# Patient Record
Sex: FEMALE | Race: WHITE
[De-identification: names, ages, dates, MRNs, and addresses within clinical notes are randomized per-mention and may not be internally consistent; named-entity substitution may affect disease eponyms.]

---

## 2017-07-04 ENCOUNTER — HOSPITAL ENCOUNTER (EMERGENCY)
Dept: HOSPITAL 53 - M ED | Age: 72
LOS: 1 days | Discharge: HOME | End: 2017-07-05
Payer: COMMERCIAL

## 2017-07-04 VITALS — WEIGHT: 137.79 LBS | HEIGHT: 63 IN | BODY MASS INDEX: 24.41 KG/M2

## 2017-07-04 DIAGNOSIS — I10: Primary | ICD-10-CM

## 2017-07-04 DIAGNOSIS — Z79.52: ICD-10-CM

## 2017-07-04 DIAGNOSIS — Z79.899: ICD-10-CM

## 2017-07-04 DIAGNOSIS — R94.31: ICD-10-CM

## 2017-07-05 VITALS — DIASTOLIC BLOOD PRESSURE: 76 MMHG | SYSTOLIC BLOOD PRESSURE: 154 MMHG

## 2017-07-05 LAB
ANION GAP SERPL CALC-SCNC: 8 MEQ/L (ref 8–16)
BASOPHILS # BLD AUTO: 0.1 K/MM3 (ref 0–0.2)
BASOPHILS NFR BLD AUTO: 0.5 % (ref 0–1)
BUN SERPL-MCNC: 25 MG/DL (ref 7–18)
CALCIUM SERPL-MCNC: 9.2 MG/DL (ref 8.8–10.2)
CHLORIDE SERPL-SCNC: 106 MEQ/L (ref 98–107)
CO2 SERPL-SCNC: 27 MEQ/L (ref 21–32)
CREAT SERPL-MCNC: 0.81 MG/DL (ref 0.55–1.02)
EOSINOPHIL # BLD AUTO: 0.1 K/MM3 (ref 0–0.5)
EOSINOPHIL NFR BLD AUTO: 0.5 % (ref 0–3)
ERYTHROCYTE [DISTWIDTH] IN BLOOD BY AUTOMATED COUNT: 12.3 % (ref 11.5–14.5)
GFR SERPL CREATININE-BSD FRML MDRD: > 60 ML/MIN/{1.73_M2} (ref 39–?)
GLUCOSE SERPL-MCNC: 100 MG/DL (ref 83–110)
LARGE UNSTAINED CELL #: 0.2 K/MM3 (ref 0–0.4)
LARGE UNSTAINED CELL %: 1.7 % (ref 0–4)
LYMPHOCYTES # BLD AUTO: 3.6 K/MM3 (ref 1.5–4.5)
LYMPHOCYTES NFR BLD AUTO: 28.1 % (ref 24–44)
MCH RBC QN AUTO: 32 PG (ref 27–33)
MCHC RBC AUTO-ENTMCNC: 33.9 G/DL (ref 32–36.5)
MCV RBC AUTO: 94.4 FL (ref 80–96)
MONOCYTES # BLD AUTO: 0.9 K/MM3 (ref 0–0.8)
MONOCYTES NFR BLD AUTO: 6.6 % (ref 0–5)
NEUTROPHILS # BLD AUTO: 8.1 K/MM3 (ref 1.8–7.7)
NEUTROPHILS NFR BLD AUTO: 62.7 % (ref 36–66)
PLATELET # BLD AUTO: 277 K/MM3 (ref 150–450)
POTASSIUM SERPL-SCNC: 4 MEQ/L (ref 3.5–5.1)
SODIUM SERPL-SCNC: 141 MEQ/L (ref 136–145)
WBC # BLD AUTO: 12.9 K/MM3 (ref 4–10)

## 2017-07-06 NOTE — ECGEPIP
Stationary ECG Study

                           [facility] - ED

                                       

                                       Test Date:    2017

Pat Name:     LISANDRO MEHTA         Department:   

Patient ID:   [id number]                 Room:         -

Gender:       F                        Technician:   de

:          1945               Requested By: JOSE MANUEL VALENZUELA

Order Number: [id number]     Reading MD:   Brionan Rouse

                                 Measurements

Intervals                              Axis          

Rate:         54                       P:            64

VA:           189                      QRS:          44

QRSD:         84                       T:            27

QT:           417                                    

QTc:          395                                    

                           Interpretive Statements

SINUS BRADYCARDIA

NSTTW ABNORMALITY

NO PRIOR FOR COMPARISON

Electronically Signed On 2017 7:20:48 EDT by Brionna Rouse

## 2017-09-18 ENCOUNTER — HOSPITAL ENCOUNTER (OUTPATIENT)
Dept: HOSPITAL 53 - M RAD | Age: 72
End: 2017-09-18
Attending: FAMILY MEDICINE
Payer: COMMERCIAL

## 2017-09-18 DIAGNOSIS — Z12.31: Primary | ICD-10-CM

## 2017-09-18 NOTE — REPMRS
Patient History

The patient states she has not had a clinical breast exam in over

a year. Patient is postmenopausal.

Took hormonal contraceptives for 7 years.  Took unspecified 

hormones for 3 years.

 

Digital Mammo Screening Bilat: September 18, 2017 - Exam #: 

[id number]

Bilateral CC and MLO view(s) were taken.

 

Technologists: Nery Mendosa, Technologist; Ebenezer Mercado, 

Technologist

Prior study comparison: September 15, 2016, bilateral digital 

mammo screening bilat performed at [facility].  

September 10, 2015, digital woman screen mammo, performed at 

[facility].

 

FINDINGS: There are scattered fibroglandular densities.  There 

has been no change in the appearance of the mammogram from the 

prior studies.  There is a mild amount of residual fibroglandular

tissue which is fairly symmetric. There is no interval 

development of dominant mass, architectural distortion, or 

clustered microcalcification suggestive of malignancy.

 

ASSESSMENT: BI-RADS/ACR category 1 mammogram. Negative.

 

Recommendation

Routine screening mammogram in 1 year (for women over age 40).

This mammogram was interpreted with the aid of an FDA-approved 

computer-aided dectection system.

 

Electronically Signed By: Kirill Dias MD 09/18/17 [id number]

## 2018-09-19 ENCOUNTER — HOSPITAL ENCOUNTER (OUTPATIENT)
Dept: HOSPITAL 53 - M RAD | Age: 73
End: 2018-09-19
Attending: FAMILY MEDICINE
Payer: COMMERCIAL

## 2018-09-19 DIAGNOSIS — Z12.31: Primary | ICD-10-CM

## 2018-09-19 DIAGNOSIS — Z79.890: ICD-10-CM

## 2018-09-19 DIAGNOSIS — Z79.3: ICD-10-CM

## 2018-09-19 PROCEDURE — 77067 SCR MAMMO BI INCL CAD: CPT

## 2019-09-20 ENCOUNTER — HOSPITAL ENCOUNTER (OUTPATIENT)
Dept: HOSPITAL 53 - M RAD | Age: 74
End: 2019-09-20
Attending: FAMILY MEDICINE
Payer: MEDICARE

## 2019-09-20 DIAGNOSIS — Z12.31: Primary | ICD-10-CM

## 2019-09-20 NOTE — REP
BILATERAL SCREENING DIGITAL MAMMOGRAM WITH 3D TOMOSYNTHESIS:

 

There are no palpable abnormalities or other breast complaints.

The the patient states she has not had a clinical breast examination in over a

year.

 

The Tyrer-Cuzick Score is: 2.8% .

 

Comparison is 09/02/2014.

 

There are scattered areas of fibroglandular density.

There is no dominant mass, micro calcific cluster or architectural distortion

that would indicate malignancy.

There are no additional findings on 3D tomosynthesiss.

There is no change from the prior study.

 

Impression:

BIRADS/ACR category 1 mammogram.  Negative.

 

Recommendation:

Routine annual screening mammography.

 

This mammogram was interpreted with the aid of a FDA approved computer-aided

detection system.

 

A. Negative mammogram reports should not delay biopsy if a dominant or clinically

suspicious mass is present.

B. Not all breast cancers are identified by mammography or tomosynthesis.

C.  Adenosis and dense breasts may obscure an underlying neoplasm.

 

Patient letter M1.

 

 

Electronically Signed by

Kirill Shearer MD 09/20/2019 11:36 A

## 2020-08-19 ENCOUNTER — HOSPITAL ENCOUNTER (OUTPATIENT)
Dept: HOSPITAL 53 - M LAB | Age: 75
End: 2020-08-19
Attending: PHYSICIAN ASSISTANT
Payer: MEDICARE

## 2020-08-19 DIAGNOSIS — R94.31: Primary | ICD-10-CM

## 2020-08-19 DIAGNOSIS — R06.02: ICD-10-CM

## 2020-09-30 ENCOUNTER — HOSPITAL ENCOUNTER (OUTPATIENT)
Dept: HOSPITAL 53 - M WHC | Age: 75
End: 2020-09-30
Attending: FAMILY MEDICINE
Payer: MEDICARE

## 2020-09-30 DIAGNOSIS — Z92.0: ICD-10-CM

## 2020-09-30 DIAGNOSIS — Z12.31: Primary | ICD-10-CM

## 2020-10-01 NOTE — REPMRS
Patient History

The patient states she has not had a clinical breast exam in over

a year.

Took hormonal contraceptives for 7 years.  Took unspecified 

hormones for 3 years.

 

3D TOMOSYNTHESIS WAS PERFORMED.

 

[facility] lifetime risk for breast cancer is 2.5 %.

 

Volpara density b.

 

Digital Woman Screen Mammo: September 30, 2020 - Exam #: 

[id number]

Bilateral CC and MLO view(s) were taken.

 

Technologist: Debra Woodard, Technologist

Prior study comparison: September 20, 2019, bilateral digital 

mammo screening bilat, performed at [facility].  

September 19, 2018, bilateral digital mammo screening bilat, 

performed at [facility].

 

FINDINGS: There are scattered fibroglandular densities.  There 

has been no change in the appearance of the mammogram from the 

prior studies.  There is a mild amount of residual fibroglandular

tissue which is fairly symmetric. There is no interval 

development of dominant mass, architectural distortion, or 

clustered microcalcification suggestive of malignancy.

 

Assessment: BI-RADS/ACR category 1 mammogram. Negative Mammogram.

 

Recommendation

Routine screening mammogram in 1 year (for women over age 40).

This mammogram was interpreted with the aid of an FDA-approved 

computer-aided dectection system.

 

Electronically Signed By: Kirill Dias MD 09/30/20 1171

## 2021-02-15 ENCOUNTER — HOSPITAL ENCOUNTER (EMERGENCY)
Dept: HOSPITAL 53 - M ED | Age: 76
Discharge: HOME | End: 2021-02-15
Payer: MEDICARE

## 2021-02-15 VITALS — WEIGHT: 135.14 LBS | BODY MASS INDEX: 23.95 KG/M2 | HEIGHT: 63 IN

## 2021-02-15 VITALS — DIASTOLIC BLOOD PRESSURE: 67 MMHG | SYSTOLIC BLOOD PRESSURE: 131 MMHG

## 2021-02-15 DIAGNOSIS — I10: ICD-10-CM

## 2021-02-15 DIAGNOSIS — N20.0: ICD-10-CM

## 2021-02-15 DIAGNOSIS — Z79.899: ICD-10-CM

## 2021-02-15 DIAGNOSIS — R10.84: Primary | ICD-10-CM

## 2021-02-15 DIAGNOSIS — K57.30: ICD-10-CM

## 2021-02-15 LAB
ALBUMIN SERPL BCG-MCNC: 3.8 GM/DL (ref 3.2–5.2)
ALT SERPL W P-5'-P-CCNC: 16 U/L (ref 12–78)
BASOPHILS # BLD AUTO: 0 10^3/UL (ref 0–0.2)
BASOPHILS NFR BLD AUTO: 0.5 % (ref 0–1)
BILIRUB CONJ SERPL-MCNC: 0.1 MG/DL (ref 0–0.2)
BILIRUB SERPL-MCNC: 0.7 MG/DL (ref 0.2–1)
BUN SERPL-MCNC: 26 MG/DL (ref 7–18)
CALCIUM SERPL-MCNC: 9.4 MG/DL (ref 8.8–10.2)
CHLORIDE SERPL-SCNC: 106 MEQ/L (ref 98–107)
CK MB CFR.DF SERPL CALC: 2.05
CK MB SERPL-MCNC: 1.5 NG/ML (ref ?–3.6)
CK SERPL-CCNC: 73 U/L (ref 26–192)
CO2 SERPL-SCNC: 26 MEQ/L (ref 21–32)
CREAT SERPL-MCNC: 1.01 MG/DL (ref 0.55–1.3)
EOSINOPHIL # BLD AUTO: 0.1 10^3/UL (ref 0–0.5)
EOSINOPHIL NFR BLD AUTO: 1.5 % (ref 0–3)
GFR SERPL CREATININE-BSD FRML MDRD: 56.6 ML/MIN/{1.73_M2} (ref 39–?)
GLUCOSE SERPL-MCNC: 100 MG/DL (ref 70–100)
HCT VFR BLD AUTO: 41.4 % (ref 36–47)
HGB BLD-MCNC: 13.4 G/DL (ref 12–15.5)
LIPASE SERPL-CCNC: 124 U/L (ref 73–393)
LYMPHOCYTES # BLD AUTO: 1.7 10^3/UL (ref 1.5–5)
LYMPHOCYTES NFR BLD AUTO: 28.4 % (ref 24–44)
MCH RBC QN AUTO: 30.6 PG (ref 27–33)
MCHC RBC AUTO-ENTMCNC: 32.4 G/DL (ref 32–36.5)
MCV RBC AUTO: 94.5 FL (ref 80–96)
MONOCYTES # BLD AUTO: 0.5 10^3/UL (ref 0–0.8)
MONOCYTES NFR BLD AUTO: 8.7 % (ref 2–8)
NEUTROPHILS # BLD AUTO: 3.6 10^3/UL (ref 1.5–8.5)
NEUTROPHILS NFR BLD AUTO: 60.7 % (ref 36–66)
PLATELET # BLD AUTO: 248 10^3/UL (ref 150–450)
POTASSIUM SERPL-SCNC: 4.9 MEQ/L (ref 3.5–5.1)
PROT SERPL-MCNC: 6.9 GM/DL (ref 6.4–8.2)
RBC # BLD AUTO: 4.38 10^6/UL (ref 4–5.4)
SODIUM SERPL-SCNC: 140 MEQ/L (ref 136–145)
TROPONIN I SERPL-MCNC: < 0.02 NG/ML (ref ?–0.1)
WBC # BLD AUTO: 5.9 10^3/UL (ref 4–10)

## 2021-02-15 PROCEDURE — 96361 HYDRATE IV INFUSION ADD-ON: CPT

## 2021-02-15 PROCEDURE — 93005 ELECTROCARDIOGRAM TRACING: CPT

## 2021-02-15 PROCEDURE — 82553 CREATINE MB FRACTION: CPT

## 2021-02-15 PROCEDURE — 83690 ASSAY OF LIPASE: CPT

## 2021-02-15 PROCEDURE — 80053 COMPREHEN METABOLIC PANEL: CPT

## 2021-02-15 PROCEDURE — 74177 CT ABD & PELVIS W/CONTRAST: CPT

## 2021-02-15 PROCEDURE — 96374 THER/PROPH/DIAG INJ IV PUSH: CPT

## 2021-02-15 PROCEDURE — 82550 ASSAY OF CK (CPK): CPT

## 2021-02-15 PROCEDURE — 81001 URINALYSIS AUTO W/SCOPE: CPT

## 2021-02-15 PROCEDURE — 99285 EMERGENCY DEPT VISIT HI MDM: CPT

## 2021-02-15 PROCEDURE — 84484 ASSAY OF TROPONIN QUANT: CPT

## 2021-02-15 PROCEDURE — 93041 RHYTHM ECG TRACING: CPT

## 2021-02-15 PROCEDURE — 80076 HEPATIC FUNCTION PANEL: CPT

## 2021-02-15 PROCEDURE — 74021 RADEX ABDOMEN 3+ VIEWS: CPT

## 2021-02-15 PROCEDURE — 85025 COMPLETE CBC W/AUTO DIFF WBC: CPT

## 2021-02-15 RX ADMIN — DIATRIZOATE MEGLUMINE AND DIATRIZOATE SODIUM SCH ML: 600; 100 SOLUTION ORAL; RECTAL at 11:35

## 2021-02-15 RX ADMIN — DIATRIZOATE MEGLUMINE AND DIATRIZOATE SODIUM SCH ML: 600; 100 SOLUTION ORAL; RECTAL at 11:10

## 2021-02-15 NOTE — CCD
Summarization Of Episode

                             Created on: 02/15/2021



LISANDRO PLEITEZ

External Reference #: [id number]

: 1945

Sex: Female



Demographics





                          Address                   [address] [place]  [address]

 

                          Home Phone                [phone]

 

                          Preferred Language        English

 

                          Marital Status            

 

                          Yazdanism Affiliation     [facility]

 

                          Race                      White

 

                          Ethnic Group              Not  or 





Author





                          Author                    [facility]

 

                          Organization              [facility]

 

                          Address                   Unknown

 

                          Phone                     Unavailable







Support





                Name            Relationship    Address         Phone

 

                RETIRED         Next Of Kin     Unknown         [phone]

 

                RE              Next Of Kin     Unknown         Unavailable

 

                UE              Next Of Kin     Unknown         Unavailable

 

                    JOSE GUADALUPE PLEITEZ JR   Next Of Kin         [place], [place]  [address] [phone]

 

                    valdemar pleitez     ECON                [address] [place]  [address]                    [phone]







Care Team Providers





                    Care Team Member Name Role                Phone

 

                    SymenowLatia PA Unavailable         Unavailable

 

                    Symenow, Latia Stuart PA Unavailable         Unavailable

 

                    Symenow, Latia Stuart PA Unavailable         Unavailable

 

                    Symenow, Latia Stuart PA Unavailable         Unavailable

 

                    Symenow, Latia Stuart PA Unavailable         Unavailable

 

                    Symenow, Latia Stuart PA Unavailable         Unavailable

 

                    Symenow, Latia Stuart PA Unavailable         Unavailable

 

                    Symenow, Latia Sade PA Unavailable         Unavailable

 

                    Symenow, Latia Sade PA Unavailable         Unavailable

 

                    Symenow, Latia Stuart PA Unavailable         Unavailable

 

                    Symenow, Latia Sade PA Unavailable         Unavailable

 

                    Symenow, Latia Sade PA Unavailable         Unavailable

 

                    Symenow, Latia Sade PA Unavailable         Unavailable

 

                    Symenow, Latia Sade PA Unavailable         Unavailable

 

                    Symenow, Latia Sade PA Unavailable         Unavailable

 

                    Symenow, Latia Sade PA Unavailable         Unavailable

 

                    Symenow, Latia Sade PA Unavailable         Unavailable

 

                    Symenow, Latia Sade PA Unavailable         Unavailable

 

                    Symenow, Latia Sade PA Unavailable         Unavailable

 

                    Symenow, Latia Sade PA Unavailable         Unavailable

 

                    Symenow, Latia Sade PA Unavailable         Unavailable

 

                    Symenow, Latia Sade PA Unavailable         Unavailable

 

                    Symenow, Latia Sade PA Unavailable         Unavailable

 

                    Symenow, Latia Sade PA Unavailable         Unavailable

 

                    Symenow, Latia Sade PA Unavailable         Unavailable

 

                    Symenow, Latia Sade PA Unavailable         Unavailable

 

                    Symenow, Latia Sade PA Unavailable         Unavailable

 

                    Symenow, Latia Sade PA Unavailable         Unavailable

 

                    Symenow, Latia Sade PA Unavailable         Unavailable

 

                    Symenow, Latia Sade PA Unavailable         Unavailable

 

                    Symenow, Latia Sade PA Unavailable         Unavailable

 

                    Symenow, Latia Sade PA Unavailable         Unavailable

 

                    Symenow, Latia Sade PA Unavailable         Unavailable

 

                    Symenow, Latia Sade PA Unavailable         Unavailable

 

                    Symenow, Latia Sade PA Unavailable         Unavailable

 

                    Symenow, Latia Sade PA Unavailable         Unavailable

 

                    MAJAK, R MENDOZA DPM Unavailable         Unavailable

 

                    MAJAK, R MENDOZA DPM Unavailable         Unavailable

 

                    MAJAK, R MENDOZA DPM Unavailable         Unavailable

 

                    MAJAK, R MENDOZA DPM Unavailable         Unavailable

 

                    MAJAK, R MENDOZA DPM Unavailable         Unavailable

 

                    MAJAK, R MENDOZA DPM Unavailable         Unavailable

 

                    MAJAK, R MENDOZA DPM Unavailable         Unavailable

 

                    MAJAK, R MENDOZA DPM Unavailable         Unavailable

 

                    MAJAK, R MENDOZA DPM Unavailable         Unavailable

 

                    MAJAK, R MENDOZA DPM Unavailable         Unavailable

 

                    MAJAK, R MENDOZA DPM Unavailable         Unavailable

 

                    MAJAK, R MENDOZA DPM Unavailable         Unavailable

 

                    MAJAK, R MENDOZA DPM Unavailable         Unavailable

 

                    MAJAK, R MENDOZA DPM Unavailable         Unavailable

 

                    MAJAK, R MENDOZA DPM Unavailable         Unavailable

 

                    MAJAK, R MENDOZA DPM Unavailable         Unavailable

 

                    MAJAK, R MENDOZA DPM Unavailable         Unavailable

 

                    MAJAK, R MENDOZA DPM Unavailable         Unavailable

 

                    MAJAK, R MENDOZA DPM Unavailable         Unavailable

 

                    MAJAK, R MENDOZA DPM Unavailable         Unavailable

 

                    MAJAK, R MENDOZA DPM Unavailable         Unavailable

 

                    MAJAK, R MENDOZA DPM Unavailable         Unavailable

 

                    MAJAK, R MENDOZA DPM Unavailable         Unavailable

 

                    MAJAK, R MENDOZA DPM Unavailable         Unavailable

 

                    MAJAK, R MENDOZA DPM Unavailable         Unavailable

 

                    MAJAK, R MENDOZA DPM Unavailable         Unavailable

 

                    MAJAK, R MENDOZA DPM Unavailable         Unavailable

 

                    MAJAK, R MENDOZA DPM Unavailable         Unavailable

 

                    MAJAK, R MENDOZA DPM Unavailable         Unavailable

 

                    MAJAK, R MENDOZA DPM Unavailable         Unavailable

 

                    ANA PAULA CASTORENA  Unavailable         Unavailable

 

                    ANA PAULA CASTORENA  Unavailable         Unavailable

 

                    ANA PAULA CASTORENA  Unavailable         Unavailable

 

                    ANA PUALA CASTORENA  Unavailable         Unavailable

 

                    ANA PAULA CASTORENA  Unavailable         Unavailable

 

                    ANA PAULA CASTORENA  Unavailable         Unavailable



                                  



Re-disclosure Warning

          The records that you are about to access may contain information from 
federally-assisted alcohol or drug abuse programs. If such information is 
present, then the following federally mandated warning applies: This information
has been disclosed to you from records protected by federal confidentiality 
rules (42 CFR part 2). The federal rules prohibit you from making any further 
disclosure of this information unless further disclosure is expressly permitted 
by the written consent of the person to whom it pertains or as otherwise 
permitted by 42 CFR part 2. A general authorization for the release of medical 
or other information is NOT sufficient for this purpose. The Federal rules 
restrict any use of the information to criminally investigate or prosecute any 
alcohol or drug abuse patient.The records that you are about to access may 
contain highly sensitive health information, the redisclosure of which is 
protected by Article 27-F of the [facility] Public Health law. If you 
continue you may have access to information: Regarding HIV / AIDS; Provided by 
facilities licensed or operated by the [facility] Office of Mental Health; 
or Provided by the [facility] Office for People With Developmental 
Disabilities. If such information is present, then the following [place] 
mandated warning applies: This information has been disclosed to you from 
confidential records which are protected by state law. State law prohibits you 
from making any further disclosure of this information without the specific 
written consent of the person to whom it pertains, or as otherwise permitted by 
law. Any unauthorized further disclosure in violation of state law may result in
a fine or nursing home sentence or both. A general authorization for the release of 
medical or other information is NOT sufficient authorization for further disc
losure.                                                                         
    



Family History

          



             Family Member Name Family Member Gender Family Member Status Date o

f Status 

Description                             Data Source(s)

 

           Unknown    Male       Problem                          [facility] ([facility]

 

           Unknown    Male       Problem                          [facility]

 

           Unknown    Male       Problem                          [facility]

 

           Unknown    Male       Problem                          [facility]

 

           Unknown    Male       Problem                          [facility]

 

           Unknown    Male       Problem                          [facility]



                                                                                
                 



Encounters

          



           Encounter  Providers  Location   Date       Indications Data Source(s

)

 

                Office Visit    Attender: MENDOZA NORRIS [facility] Office  08:15:00 AM 

EST                                                 [facility] ([facility]

 

                Outpatient      Attender: GLENNY CASTORENAAdmitter: GLENNY CASTORENA ES1

-DEPT        10/05/2020 

07:30:00 AM EDT - 10/05/2020 11:40:00 AM EDT                           [facility]

 

                                        Patient discharged. 

 

           Outpatient Attender: Kate Symenow PA Main Office 2020 08:15:00 

AM EDT            

[facility] ([facility])

 

                [address] [address] 2020 

12:00:00 AM EST                                     eCW1 ([facility])



                                                                                
                                     



Immunizations

          



             Vaccine      Date         Status       Description  Data Source(s)

 

                    COVID-19 VACCINE, MRNA, ECX773D9, LNP-S (PFIZER)/PF 20

21 12:00:00 AM EST 

completed                                           [facility]

 

                          INFLUENZA VIRUS VACCINE QUADRIVAL [id number](6 MOS AND 

UP)/PF 10/14/2020 12:00:00

AM EDT              completed                               [facility]



                                                                                
                 



Medications

          



          Medication Brand Name Start Date Product Form Dose      Route     Admi

nistrative 

Instructions Pharmacy Instructions Status     Indications Reaction   Description

 Data 

Source(s)

 

           Ticagrelor 90 MG Oral Tablet [Brilinta] Brilinta   09/15/2020 12:00:0

0 AM EDT                       

ORAL                          active                                  [facility] ([facility]

 

          90 mg               09/15/2020 12:00:00 AM EDT tablet    60           

       TAKE ONE TABLET BY MOUTH TWICE A 

DAY        TAKE ONE TABLET BY MOUTH TWICE A DAY SOLD: 09/15/2020                

                  [facility]

 

                atorvastatin 20 MG Oral Tablet Atorvastatin Calcium 2020 1

2:00:00 AM EDT  

                ORAL                    active                          [facility] (

[facility])



                                                                                
        



Insurance Providers

          



             Payer name   Policy type / Coverage type Policy ID    Covered party

 ID Covered 

party's relationship to greer Policy Greer             Plan Information

 

          WELLCARE            [id number]           SP                  [id number]

 

          WELLCARE MEDICARE           [id number]           [place]                 [id number]

[id number]

 

          WELLCARE  O         [id number]           S                   [id number]

 

          WELLCARE            [id number]           SP                  [id number]

 

          TODAYS OPTIONS           [id number]           SP                  [id number]

[id number]

 

          TODAYS OPTIONS/AMERICAN O         [id number]           S               

    [id number]

 

          TODAYS OPTIONS           [id number]           SP                  [id number]

[id number]

 

          [facility] Commercial [id number]           Self          

      [id number]

 

          [employer] Ppo Commercial [id number]           Self               

 [id number]

 

          TODAYS OPTIONS           [id number]           SP                  [id number]

[id number]

 

          [employer] (Medicare) Medigap Part B [id number]           Self  

              [id number]

 

          [employer] Commercial [id number]           Self                [id number]

[id number]

 

          [employer] Medigap Part B [id number]           Self  

              [id number]

 

          [employer] (Medicare) Medigap Part B [id number]           Self  

              [id number]

 

          [employer] Commercial [id number]           Self                [id number]

[id number]

 

          [employer] (Medicare) Medigap Part B [id number]           Self  

              [id number]

 

          [employer] Commercial [id number]           Self                [id number]

[id number]

 

          [facility] MCR Commercial                     Self             

    

 

          [employer] Options Ppo Commercial                     Self               

  

 

          MEDICARE COMPLETE-[facility] P         [id number]           S               

    [id number]

 

          MEDICARE COMPLETE           [id number]           SP                  

[id number]

 

          [facility] MEDICARE           [id number]           [place]                 [id number]

[id number]

 

          [facility] P         [id number]           S                  

 [id number]

 

                              [id number]                               [id number]



                                                                                
                                                                                
                                                                                
                                                                              



Problems, Conditions, and Diagnoses

          



           Code       Display Name Description Problem Type Effective Dates Data

 Source(s)

 

             [id number]    Pure hypercholesterolemia Pure hypercholesterolemia Pr

oblem      2020

 12:00:00 AM EDT                        [facility] ([facility])

 

             4268742      Aortic valve disorder Aortic valve disorder Problem   

   2020 12:00:00 

AM EDT                                  [facility] ([facility])

 

           N95.2      [id number]  Vaginal atrophy Problem    2020 12:00:00 

AM EST eCW1 

([facility])

 

                I49.3           Ventricular premature depolarization Ventricular

 premature depolarization 

Diagnosis                 10/05/2020 07:30:00 AM EDT [facility]

 

                    I35.1               Nonrheumatic aortic (valve) insufficienc

y Nonrheumatic aortic (valve) 

insufficienc        Diagnosis           10/05/2020 07:30:00 AM EDT [facility]

 

                    I34.0               Nonrheumatic mitral (valve) insufficienc

y Nonrheumatic mitral (valve) 

insufficienc        Diagnosis           10/05/2020 07:30:00 AM EDT [facility]

 

                    R94.39              Abnormal result of other cardiovascular 

function study Abnormal result of

 other cardiovascular Diagnosis           10/05/2020 07:30:00 AM EDT [facility]

 

             R06.02       Shortness of breath Shortness of breath Diagnosis    1

 07:30:00 AM 

EDT                                     [facility]



                                                                                
                                                                                
        



Surgeries/Procedures

          



             Procedure    Description  Date         Indications  Data Source(s)

 

             ECHO TTHRC R-T 2D W/WOM-MODE COMPL SPEC&COLR DOP               12:00:00 AM EDT              

[facility] ([facility])

 

             MYOCARDIAL SPECT MULTIPLE STUDIES              2020 12:00:00 

AM EDT              [facility] 

([facility])

 

             CV STRS TST XERS&/OR RX CONT ECG PHYS SI&R              2020 

12:00:00 AM EDT              [facility]

 ([facility])

 

             ECG ROUTINE ECG W/LEAST 12 LDS W/I&R              2020 12:00:

00 AM EDT              [facility] 

([facility])

 

             Office Visit, Est Pt., Level 2 FC              2020 12:00:00 

AM EST              eCW1 ([facility])

 

             Office Visit, Est Pt., Level 3 PC              2020 12:00:00 

AM EST              eCW1 ([facility])



                                                                                
                                                          



Results

          



                    ID                  Date                Data Source

 

                    [id number]         10/05/2020 11:10:05 AM EDT [facility]









          Name      Value     Range     Interpretation Code Description Data Hannah

rce(s) Supporting 

Document(s)

 

          EKG                                               [facility] 

ULGSIw8aZfVFVbNjc6SkZoXaSNLeGO6jmqu9U5E6dTMgO2NxlMXtz7igY7YkG9DcCRTtDQYOAK2FnTOw

jb2
AlxbxrS5Jwk5NMw206UB1KcV4qqv7Xz1wnYKOuvYeqBd3rbxJhVkhJQMW6BFNdk4NpVGpnHFxeVBQdGr

5jiRUtX2QraPxzNBSxIQtuTAZaU53abKKyA1cXKFMhIG9zw7LszbeiF7vkvlQwk2iOkoEgGDocXtYbBo

NlRJWwfpGkB5hrmNMojXeuSV9+XZ4qn9NfAxWgUABu
HH2mydw8K7W2iASoP0ZvwsPrS0S8RmF5jCFxW5L9nQAcPX8MJC6cJZ6ANnWeK5AyG57npC5gTK4DcV9Q

zyXtLB7oi1QchccqJ6Aho8GGr756NV6ARJt3OFIwM9KgJq8eIZ0+IK4uu1IpWqGfHXPiPJ4qzon2J3W1

aICuY0CikzOdE7Z4ZzS5xAXgD1Y4qLAgVN8YOD8sBM
5RSQSaM8XeR59zzQ5rUG8CaF1DyvQaYQ9yw1GcuswnP4Ich4FXx086RX2SYAy4VLIfN4UqV1FquQT4AU

4+YH3fy8LxAlBkVDUnXA4agdf1M6N3pRRcE5DmuqBmQ9W5HxO9sUDlM9B5eGZjCZ1YJA6fKH2EJweoV1

CeH03jeZ6hJH9VxP5MddYcXC0ui5DzgvctJ2Kxs7LU
e814PB1TALh5JKCnM4XxKb0oXT7phGjfvBC+IcAlLM3kqssrXQGsUZIkPlz8GQ4BzESyRO8Vl807TH4N

rME8vMXgTV5UwLBnBZYnBtMxCRWeK5UmMX6HocIdRQzrApDpW7goUZ4opLBnI19lfL1jSZ1DHYCoSr8x

pHFzK872ozrogm4+DD7vj1FpBjBdWjWsNT9sest1I9
D7wRMcI7ZajzVgS2U8FlH7tYYiM6G8aYHwBK9FNF6aSZ3HDiKwH7JpO20flT0cNE6HeG5YapJnJD5rr5

MbsnlgV8Ycg5LEi775XO0Jh5XlmUZkWKWqqWG+WnTnZA5mszoeNTLlJNDvCer9WQ6JnMImCR4Ff435CX

9JxHC9zVUkCH7QzUXxYKDoFxLiHOGzQ9lzUC3EjsDd
BTxaQyMcG7kkSM4usODmF61xpI0qBS7HEQXqDp0tsVEnZ132vbdjuc7CLjsxcAQiGj8puvGjZckRLED1

RGMje6HdXPdbMKjjBBJpQo3gmHAvD1EouHloEAIdCPqpXPJyT77mfWWrX8OOJGIuNQ1dq1OaxqngR8so

wpUgh8iReuZrDIluWgTiXzHlQEWsuoXmG2SpkYRoSB
TxAm0lAA7qsEjvwDF+ZfDtXB5vkizdFNLwQSAvUxu8SN7ChTPoHR3Uy197TY4CxWF6vQRdMX6XdOEqRV

MpXuMhVLAuWPMxLB7XbhOqWMvlDeRsH2mdIG1kbNGaS87fkI3tTH0GATSwHk7zpNRuGKpgFGTqXw8iWI

4+YcIfUQ1kgflzFHXhLOXtTkn0RU9AfJYlHS5Sz261
OA7CsEI5zZZvFS3ZmEWiRFWpXtInSISiRPRhZW9CmlOyLXltHeIcV7nlUD0zwJElX09ugP5eCH3LVMUs

Tc9fjCYcTEvrSFJbQa2eJR2+SK4pj8KfTtYmLsGkUX9qwyz0M9G4vQJbM7AzcrSnX1H7FoO9fXYeD4E2

qUKpVJ5MKT1mBC4JYSWkF6RbS25bhN6gHT0KnN7Gru
UhOV0ha9JjzyfmR8Iwz1VTi116RU7KzR0yct8WsTPdkYU+FoKwHK2njnokFPobUJJvFje5TA3UZVP0AQ

MoQmAfRXBqCOEjUCYfC6bWCBbeEXVBJC4MMevaXEJuJJMVEN7XFTKvZLGmWPFtO9TIXHOhUWRfCeLkO6

viAOKaJLXORX6AVioaIJCeVJYOZL2PAVFfCBHbATSp
Z0SUKVU9SBHmXcCaQGckYTwcONKEYJ3JBqqcQnUiCJK+KlNdVP0igelfUtFuHC3yemf3Q3Qfj3BLJLSj

Rd4OAUXkD2UurOOrBP5Ge669FZS7IADlWk7+DY4rf0ZgRfEuQZUzy5VwFHgqNWdqEPDjARSgIKAyRIPb

jXYWq8unZoIkULL6JSFbEelmHHYvTYHlRP19RVHyVG
AHTX4ZWGGwiDFjQUClEzBuRLJVZZ4Sj329QB98qtCoDRTxQOSjD7JzhKK3XOB4UTXfPB7jb9ZyANijQi

2xkqErBgmBKwEfPHOcDhw3LSOzGLMsY0CuMCTyDaT1ZW5WjXw3DBEnR9YhDLFcJXWvb4KxGl1Ad9TcWD

IeUaeklG4VmrH0ol7M31/kwPcW7OAOesBoBQUgRCbB
fVG4XuGhAVs7hPf+dpYJqOuZeLyKb9V8c7pUERBVtdY+Qoh4pe8b47iMG//rTTx/wvzn3//2b3/+svHn

//3r58HP//7738m9h0/9yz/++k//+L/+NH/+/ON//PW//eMv+8lm+fT5j/Pxz3/+y3/+j9uth3CCK//2

5yTH/Pn37z//6n09W9B7oL+DKQ/W+0DZL6qiX9u+Hx
ckrY7zERE99SZs0MW1bBKAokjSuomUZWOGSUwh5m2Td6vx1FqCq0kh3XEaj6Gwtn4lwEf3UW02vlvJ4C

4Ijbzu9bfyflR6mAqUt8y4e9MyMydP8P4q4MkiW1U0GiYqecct1fA2LM9dcizreoY9qNhVp4q4b3CbDd

qV0x5c7apzT1h6dqDjp3c78wZ6ME6b1bpysXHYszjG
a4t5h5NqR1b17V6i7MkMORqA0ZeqgqE3YV0ECLnWmleTrwQ9MqwZ3e8Ji6GdFZkOoOjF0HbgMpHLkdbY

6BhzB3Wmxg3wkVnFwj85x7g5Nnu5oS8Z6Zkrghv5IQ6PIMegF0oC7XOjW+4i8d7Ngl6y23T1heEptF5I

85SZzdBwAzgK6rOqyGbbhwwpfw9+Kzt7ocs23pX8PO
fn9zptrLCteltEi9j0n8SiI7s07p9v6nkz04B8hcRjm3+G7jF40JF++Gl+vr7NDz/Oz9fX+eHn+fn6Pj

/8QD9fX+iHn+sw2eq71OM9oF6xY43pk062niDg3ffILON+1mtUvGnXXmjWig3ltxi8AUVsdNgyZiaAmy

Nd0rcTTDC9FRxZCeKzIGctilDJC4/noQmcP9eM5GMa
U9vInZhJo62zYN7vmxblwbq3NX5PVLB3rfHVFdl94NgPC4Jdr9KgNtTRW1sx9DiqXqZC6XnWYOdl8oPt

GM0V+7SWjKrNpWqvUzPMnwfEg8HeR4aP5FdzE4vCsBbPp+VsSF5mj9VnTi/2Id4VgEr8ll+RKnl8CVZR

E8a+uWgbLHTmPsmOgHGo19LRqEpAFpow4wTKKZ9W+7
JbtTcYncqqBoKXhnhsq0wtMmfK5cyaM8zGgEaQs70aSJerpjkXxXnOlSI9qnaEiyVbK/Ly6fuQQJqepR

nrjz2QXNKfb23cCYWWS078koWRgJ7Rq2ha1BbwLfA57HzvMXu85gKpJK1l+1X9zQqKlxt0HuQSpnhic1

wzQ4fI4cLrW4iZyAwId+0mZO0ge/oxmj/8Ce0VKFN0
ffUTHsA86KMDH9m+kTAgPDPjylZCZLXfY5cjTwjZGPY8svpMAqShS/ZlDzntIf+gl9w4aV/LZ735wH/s

Iac95F/3mIYb4k70eWwR+Qf19YOV/MsectpD/nNKLb7f/9OUzMwRw4rNIweGz+yaaz8aT/eQ0f4vL6nR

aQ/0wm2icRq0fx7i8jD+jW3oVpPpWuXzMdRs1cjha+
lxteHT262JDuh32ta4zyRruKewdnLjgjFpNeSrBuelPf+sr3c9gL/QG136zL/nOsk99Z/3fHCw1d79eH

kP+Ld91EQK/MsectpD/uROCw7c/0NXcSeEr7aWWmeJb+nnpw7eR/hG2r0qG4dZwM/3gb5lwWo1rr2j2o

P+KP194OU/clqa9sN/5KAFj2c/5FZwTiNm5vCTYuQm
e8hpD/nIHsR1f/zLHnLaQ/5lDzntIf+wb0f6cP/DW630fH/rKia89A/1eOMp7c78gLiB+Zw12CQC/Mse

ctpD/kVQIs2m/6AHfClPl5iE/DdCfmIUnX5iMeg1cevx80vnytpluO6L6hUss4SbsB41p4A+GXwy+ITt

iqgP8K8wi3kzwlgbwN3T6TwhN4C1KlqD1N2K3vY8a1
M1pm4vxT0gkdmk74wrxvT9vjxnOH0b1TlXA4k055LCit86oKcace3s4njdL8s4AfwI5R5O1uF8X6K6f+

5EqB5okkjsJ9artlKRpefoXOBjGKjSuKrO4jd1A9R1e+3YfrhRtdO4zm2PzjdYyqY6bs8MllzKptQ2ce

7WqtkGqiR3tj4UcG3meyerT3lfhsYTvkitTINnyRwE
NEva9uNFknu9TRGxvl5R9Ixfd0N0Whdb9K2H1nd0X7L7u+2QhP2njchxc9gdorLjnkeaRJec4RyVUvu0

36ZAfu56sANjdb9o2khly1W4Gbiv1A6I5sw3B1B5d+3JbebPp+t17OxAI5oeFS95juocQJpzJ70DfYau

1ke+rY98XB/3zx4kbN8uaFgjQ1qjSkeK0uoPXoK1ad
QWmB6l0MWvg9kAyhhl0nOZu5uDUi+WFBHxXgLMKLWgS7eCLEECfKxBEGYUcPdNUfsNoIvyDogECCWi9c

UKXIBvQ0vWUQNYpUkILPJBqXlJXnuOaGahQfyFOVCuopPGhCEyJ0bRLoqIvQi5DZDaiStOQhrDkHzzXy

KSQHytNicPbZRsA9kESmbHlhwcIFDrmOjwDzrRjcff
Ym+WYUuyMqgFwHWpi8tXLIMNt8cDJXL8tNjv6aL9nXOWsmiDA6If1FsA5+UAYb7D3/sktvEPV7hyPP4q

frxtrerequok66m3f+yrET36ysriPcuNb6st0po6w+zyN64AtTtjEmmtwzfJ52x8w+qvM36WxDoznqTy

Rgx40M0Sq/iGDDDOu7n71IT/X+U6iXHorlaIKq12ZA
JyM4sXRpFtHhKCS/tPX1At5NlsDRJF2rn8dpj/nDa9qF83UUrBgMjMOAExM0zISpMtYgEDP/hFF3Ah9O

jfDIBImbBZlPy0jY8M9sNh/HKxeV2yyLAU5UgCgPwdFxfc5GQ/1lUpLfdHMem9iU28Ccbsc2yRty48dY

lD/gLBQu6sMk0g9jS/6ZBTh/fEe2z25W639AVb/9Ih
yo42br80nqj/rXtlyXcr80dWdh0xhikb9U3uI50yBO4P+G9rG95wwW5V+L5qE7GaLJ4q+S3gQ0P7vk5F

3wm+M/zP3KqK00f+s/jE9viJ9aq+s/tC5tkl73g+a6zeCtDf4HrP6zMzluct2l2fo+iDiUKYaISJSpjo

qWpLuMjThMsV6BZWLuoozssihQtQkCZDsErWpxnWB2
rz/81yslsrX7nf5Z/Tmen/+vhDbu5Z0tlr/upx03//1SfSC+s9xrprY3TmWLaPsmHzzW8OB8EJ0ZV6dy

GCeiS9LjsU5GczO4SwgO7IrFV1E/PO6OeK09X4N/BC1BcK41G9L/HX8NyK59C0B/Tc0DuQ26X9P/Uu1L

kE55B5I/Ll6ZuT41b1V/Tp6UrH84e9O/Ao1XmCd/2q
5mOwQnRkoKPA7N/eDV7ldpT1cQsss3hubz/1XxmOeTIidK2Fy1GT4cT9FAnokg84vM7SlLM4K/UG6g3U

G6g3UG+g3kC9E/XL1VgC09Q6A/OD3QcW46L8X/QJ8ByN07G8S/Wf6HiC27C3K/Qs9JaX30R1M/Vu1LtR

70a9G/Ly1PxG32m3B/Qp5Dhy4SbMbHJ2ig3Yw33br1
hUKiR4Rxre2G/n8wpk9ua1Z64G9etFuDMqjwNIok/tZX1TbxD6EtlC6GkkD6GtaC5CpTD3J+uctZzb9d

l6J+oprGbu7eh6Y+neoBpo5fW5R+zlhQbq4gR9Z+thbUmf0xH6K+oubQyh9j20V+bywUtu8g43J+rdqP

uk0Y3oK0dNhiu2Y/djAv2SWc/qbJbMHkfrjcxunWAQ
BMFkAYtgA/hDwG+HfqR9PCpRFxBBFDBMNJjwxAdmTRXv0f+/H8TtGaiNWb+lHYsmKFI8xgbQFw0FDD+Z

tOiCgUPXFLGvS6OZXidLcuOMZiK4RRfH7OZiQe0EEfNF4EXpZ7q7NMHlYYCcTJju4UcMdZWF4iDpZqmB

qbiC8nlPDEeaBSQYS+rd9L1ezN1xKDSAFuUoFUG5Uv
tZ3FYJFre07em18ctOLMnmEiAP3yXCfWy9iHpkXYwh3vYha6Kc1+wBKn1KiYRP9DHZLRAmyJADOzZ+Tz

s8NUYR1FrpyEzJzY6MAB5Eu6SaMkdnjsufb3UV/v5ma675+i6P5dkgCDNrQHL3kfk/7QDrMfny4+LE0w

G3PrJcwKh1h1JyrLLhtA7uNXPoEare+6TVXDlNE0rh
dH1iaAUvN+8KGpwlCHrgc7/0EiJv6dPa4lACp8Dw8f/8/euiAxrn4ImJSst+9xyBdOozM941sfZzXoyH

WshfcdMZPsJkgyGBEWwJOFvHFMLVLiQk1GwEM2c2lp0HmgASRlTl7ugGC/rpnUFNvJyYTzAXfLAPBvVg

sA+LL7VCauVx2HVBBN6wJAhqzeMNNRXWo8wCztdySE
iNhgQkcrVpFGzM4XeFtA5LbWH2RqEBWyl4bXEYt4iPn9BbNTpH5XLgB5eArqvAsgDnACCrouwmGaww5y

JXX0S1e8zciQfPdsg6p2rQHmL9ERCBjuNS2hW0pSZyWpfPGU8s+Ed2egJNaSMzyYFcwDnIWDDGedGTDA

5bT4DSHYAIJIeGvvPBnFTtFKwsUHFRsk59SxIpTsfY
1tmPZoBhQSiBsE9YRMCSWRrlx521gb3sWUQDdTRPOVhMCPaqC5K/NSWpUZRMhzJ1jKpwq+DHf75MEYSM

wEgy7fEXopvdW0d04ONMsVV7HroI4rPP3GTlSvLdqzBCL+YXJfbUJ4qPggX6LswKlQD9PPXLbKFpo2H4

oUhX9bsjFlpGHfITOBleZHW8IZsHWLYeshTNB0ZORS
wtLDdUErGDyXDd2kJASuoeRLOHSP9VWcLxbytkeZgfrBnmE5nGFxKWsEvRyTkG6USWZXrWNomjxIxEjU

PpZ3ZCEZF6ccFXPGF+s4xAaTtlj9ZraXnb8R+exNx9RCaMFNj/PVf76Ouh3R2Ndh9YSOzHYUICssFYfn

I3jq880OaB5bH1uCi1JVmdye3zDorP9wtKwpREs7gq
uZR9IpeFkK+5Bw02IpcNHS042dXFouhi2tDu3ajq9wdrWYQ12QMt1TSHHZjBpUdgnn8Cjj4qdesVp/qr

Ui0cBDo9qAotIrUTbLYK+jUpt9bKsrGA0dBSYiLw1ZS+DUlF4lq7KTCsUA+lM1xZuGl9MMVdjsmFcnVQ

tqeHSO8CVTdc2comQ8lSgvg8dLVvYkVLqWiHMgVuMu
NpJ/gM10YsiGI2cd1nltBQpXqewomJ1HMCqbIMIX95u+VenLgSUBM3+7Xw0WdFl68GR/4/rQOPadX8Fw

kdpbDoaDsxrWstTWASybNo2Xi0CXHelXpxMI147lmd8JaWLSFdVpxIgKpSFxmB4cy49SmTnigFTp03ul

JjYs7Y99FWssVqTwyrMA19SOauTKsvwjIiiPE6Kqpw
BMmJ+7iRW1rGm/vqqUb6E3Tf8ClQgh4TK3/hJMGvDntYhUwnmIIMinprh0g/4LVKnIS574XSkLomeaXJ

no4XCC4DuK6R9mxS0SVLlhw0cdOpwh0WgJ9+pNx4IKAQSpmGM5WRZWo1g1YCoUeCtAuAWygiWqGmhC1C

377uKEZ63B6dxmghBdZ8ATCWlG051fb6jWM8D/UUJ2
uCj14lmD0ns5EfbxSryJfVMtWEocQoxEKFH1JTVwxcHnLiVvxGQlqYKftPfGBCUOyUr40BwUcYFxSHd+

hxvNd2HPUQHjDjcYh14dJnGU/Vtm8OVS4NEJgCUPYWhMWT3VSlykc6wuHsoI2yx+67MMX8eyQplV0hrW

WhLQfCVHenjYnUKmxGfEv6JAqIFEPRlB4vndPDJSnx
kZyYi++gKyHeADC6XHInXGaahZoKy+aYoUZh99iOhD4GUGqu0zzGLOyW349a6248Tahd1yeopp9FfDyf

jmsDNRtfsbZssG4oZosr2XKU6DoD1BntDEFcmJKhJp9KeJ3GrGEhtZEKISrpqE3SC7BvzcTqEWFFNq2p

z1dyOSbuSKFkSTUnuN7BtIUEOXZ9JbbQL2tGXjIKDo
XgGUvwjCUmJSAnBjkxaCfGYh/QTozVCPBtjOLEnBjFiQWwXogFGykWTjxj4+scmxJsWGmxsWKJjVVbcO

2nUumscQCE5j3UMlhUSBK4ZZ74OmNCiVLA9gJzA/aNGrY7TJ9XC2RA1IWJtAToKbE8nFYFQVb1hgIxbL

RxNNGbxONwFew1FvppzPumxTVWJQNlAkQeGzSQBMw/
9a9/+rlcPtM9cyK/x3//8x/+j//lzz/+7z+/4y0vy8My4mvZ3zanlAJrCocAyTcSduiygJtxVGgZ66pA

JlBbkw2RAtDDrSVB8V+xkhVbI9mtcWPHrtDy8a/hdXebuOQQ1Upp9kFxNjSRV2KL8kVHntYvghIVmdvN

Uy2WmEBf2XjQtidaOpWB7tl9WwAjYJBsRYjV5k8LwC
5jTdnh3gk6aaBmA/cs/cEFNuuSrQiVVLNm5VMihoPfE755Ly8tZ+pULHDdyY7uOljHuVY+wP7ircnRpe

I/tWC8S1CqMxglo5DhFZs1mKL4R9RKxxJtmiJmrxqNuAT+JOieB+oED50VvnU6XkIpvaSCVQiAnvtRFk

aE81bw8VuCKezbX2xryMvDXSa9dYokezg458L4OqyW
MPo6C3q8OkhwfhI9MOfjAEg3+HNS5waa9ur+T9ei9J7ip34oN/Ms/gMoQXEizuKvOMWJBShBcWKedxv4

nU2hen5ckVkEp7MO4ZXd1KlK//Lw4hzsdcqSunMntaZ5zhQSMXEM8jui2XxrdYJV4INV6NP1CbfT0u8O

TintHJCKrRt0pr0zSxj/GFp/2el7iaS7fJAjDykDVx
4ssAioB/7jSXh8tHT0kqmDlUro3dQuHRVa3GIln+/rL2e1XVNHZ5LWyOq1KB5iSZHLJMRafOGD11n/nP

fbxY5aTcNNkiVj2SfC1stxCAqPwGnwMdfMHCMUJYYweSkOmlRJPcTdPWNVAUmUJN1SViD9J2zXwWHHQ7

R6rcHILLqL4xADgQHB7ERVLvVu0FZSviTcf458jQFA
gTWsiN6K9NKehD2wc3zD/0Ld96YlcXepm+DglMRfHRnMHxWedGCZMv6CP9YNA9n7OAHkz3/6VRpZl1DZ

y7WMBpWVBfHlCys472rUPd/vF8F+Xi0HWbOeQozpUO1bNVHJLZHDbKoPpbR/Z/DbGWreDGebgtfEKp3n

qZqvWkkQBz7h1ENWCHfsuNTBb3YO7F3bpgJki8TNWi
ZaV54YtbsbR9oaaAUmsnUUhNF7aFaIhRtyEewGG6KQPYvaecM1xfWSUo/gXAh/pn40Yq16Lf4Hq2NvQv

7V5jCrOCMe2Sw8iBVjQZetVPDSQtEpPxkvwL61FuPzMGlxg+I/vTUTHAxWILyAFCIDeC7YJ9DdCTuTn9

RnuZdJDjjEDchIRZKp2yGpBACigA6T5AHpyY6DD1wY
iXlQ+aEN1C2XOCLBLMxaK/e65HBOrRQPYwQCS89D5qeJ3rYz4Ib9HiHwB3rwi6ZWQjxS1ATU1JdK7BYg

fWwTQMCfZtws739fnOrvDlMRHKCEJmtiAdE7jRumOV36XvKGYaMmW/1Rd1KSWesWhpHhFgIVqICdQVq5

3rP42/BfJ2TRstE0JWtNKws0zI8GLUlQg14p8PnZsC
YIzo7sO3c65sP7QR4TiKUGE3wM+ODN6dApxVXtBZVS3xmRA/VCEfLy8Sww98QD7IlWu6jIUTP3l1+UYL

IPLicecNfOp+jLieeIuzjxsH+rtUHIF8rPZG3EGzI4mK/4ihNvmGmHh8/vi8Clg8NeirX6xm0bkCGOEk

mSdQpkj0EDX4oyBnqRDq1eLdsttS7Ylj9PKuCt59UT
CPgZtn5DfXRFUhviFWeoGt52zfCH5aQ04v0mDe1roDyf9U8FW2K7x2B+lRYnZnucfeDsg+ELQG63vMW6

Mc/ffcsP44K0GNKSImLS/K512e3Y/7Ur48w1/OfXcr5XbJ/kNUb97ylge8G2T6E+soAjwWzvWfzPsrD9

uWfxP+mCnKR6zIZarPain3vAsCDnjs4aUBNBhDY1ce
9CuDa57jKL1bJE5uNAujYuUj8usrirMMnp7v0oaX1ABmZ4sPFIh14F5EEkOV90+4nvWfwVJwazsQ/IiY

ZuNPwJf7BlsX8qbQ+DR2GZndj7TMqyxLLCZ2NSUWOe44zahPpokNP1TSOLQMODQeqaQVLBFJATPbgsx3

Q07XVvPObCI4UHgF77Nv9CF3JrH2eQ4IhZRhzMkFLp
72e8pNDyQL2FPxG+K6Pz24lx350CllW/DGS64ng+dpOTdQ21NHqif4OcMtkT1PIddXlcMstrS3rvvEFP

+5iZzGchmbWNgFXderIDOUEtWGsBJmw4tOH0nQ0IZlH7r1ZWHEDqTP6ZEYg+03j7vGRqfYP8IkHdzI3f

TqyjdHyZog+KGVaFk4WKOHbqrmZ+QQzsHwTPWOJyYh
MrDyQBUKd9awR9gqMyACNElZkNsTxbdkMM8HnFDIYefauMvQwB27VBBKZMmAmoadMDG8TuOpzCciGz5W

6MOVkaJeB+WxdUPJoEN5GhDQujr0HyQcQXVLxqQd0NiDuMslDqc1D0EzKPIPLO9ysYL3Ba4pzQS4d9kL

0YPGOZD/zcMeg9SgeVa+qYAc0iaDGqJ2yu+t3ySH7R
w+h8zl69wvaDAySpsLoau2k2bDw+T8HEK3V5VLtqwS6a1mOzDUMxeLNaPt/uORMEwSRYBJiNOIu3+SPg

OYvXw/weBjm7Fw7xx85gHITnpgY3fW13/l0uUaS3bK0wqv/r497mjlrz8f3jyVw4G356lf0+EeVdU4h5

HdZ6OlFL9vk/t9q8MCb/5+4kwRiGpr3/H71MPy8aG+
Lz2uauLT7I1sNXymns4K5v4bwB/jnIyiWKpxa98INZ9i/4vFr2kt/P++1vY80xu7rF3S1+mibH85UugQ

v+wPgOjO/EbhoD5uL/B/o5UG+akBXrAoGlVKl17c7K/sz06k5BjKhja0i4n8DWtlpduVvtrIrg1n5ad/

S9Iw/CF20in2v+d/3ev028O6Cz3y1idccm2jWhima3
vKBXG/n65b6lJNqz3n13wC846a73yiwwht/9nTxxtDvhRIrmXv18Zn/339P0jmgtdmw+PpA/vQ2mazL/

6rXnLdNSn/Se30183E5wa834+xcKlQt73eUYLy9l2GdLterp6/Erz+GcAL4SphlhNG4r4mGp2gh6/lg9

+eXtx+k1Ep697f73++qGOpuF1Vl38w2k/dIfJos2N1
Q/5Uf5Tgh2XlwVITvG3yC3iSJsZBcukozW/+ZWd7Ntvh5+iKv+lzcwoh1DBydCyIjTCXK/yDcV9jbm/h

KIJ3Gl/WIdN1g3pJ2XWt3Z7A13z8k+0I7ofD3He6/496nswbEDjZzSXd70SvVS+MYc5HLio8V16g3Jtt

bto5KGE2T/6P9v3Ual3Tszixegl8K9scX1BkSrsLQ+
hfYmcVWehnRH+p8hyiKQ8gto/jDF85Q8EhNCAyqx5MiPt5uibqz0AUIlx/+Cbn23gFs6ZC1/6xP9RcO3

q88wW7ep+lRtciKC84LINdQl86Nx0O7S0QP3tNci16av9bxauPg9/ggoD17wN6Hh1NZi0jO/j46gdXrr

Bk1BGk2iVUHf0SGEg2kav8/VBclflU7+yt+hd/z97e
5ghy0zojgp4lxpP/1+H81Y2e43X+t4y6zb8+/nm9GlqjCgn1jUUvTHEGJDrBQsJQ2LOaKnnMWuaG17Qz

BWT2133Llyf/APsNE4QhVXq1qHoqnAetVaTh44bpUdo1q6gQ2Ct4a/B/p5oJ8H+rr90GK6Ja/VIrGI1c

b9vSH/Xd8iRnZlU3Tc2our8B11jlaETI+W4jCwZS/M
mrP7qyBnm94EZOdogQ1gjF6A4edOL2EC/dqXbXX8/m02hMP71t/zlX+gglyn4hwri3PhpkNd8TA+6bLt

9/YAHxwX7ho5mCdfbikkl3nR/Xnb25+3n/cp4jrowllG8OP1Nf4AJW6/cmFfY4380I9ephsmw1bmHorG

H6/63b2vhszidkp3kR73/QUl4mpycIm5tejn5mKYXf
9edX/4txnenUFFkL06ecamdvbYw+F362r5VbOxc1SnJ+yKdkL6a53oVaA1bM3/oV3xjMEwgT4j/dzRz+

WaCv2pa5bWyEzAwhIAnuW+2SLoWCsqKJmC2WetKq++CkcN7QgNGgN+UTD6sRwAA1nUFln6B/tEP8/X7O

bgp977XNthlxaEX/pnw6ne3zgyk/LVyHPfU2/kSa8h
feqNzPOuH/rqyDOQjg+39+EbCn641paIc07/39++Fj8l4kmns83kn4bmF86m3MxF+tKb2IFDPqsqLL4x

z7v+H48h/g0Ee6SEzvM8wdEFfx/kbmB1tmipinQejLpf8Vd6LLgC+uWNYa8+P3e8q9x81U55PaxtkI5b

pM9Y1nO+HLk+zDzYzxrYzzoHwJ/8/D4Q9znNxoA/Zv
Unm6fa5RfXpCy8amjl52p9cz3bv10pIzu64SDMlaVjagW/X43EnjFUGZzSxhUSCO3DTiu1JhheOJCCka

Q2dYrIe/TGCI3VcUUB3fJ7rZS5iE9WfZ4XjCk06hvwgSBmhV2m8iVWwV8bekJvUSR8JVbauGsoMA7UFL

fRcN99ZKYY3r0ET+SsEbAbSlfenXMW46C/T/TzxISa
cRpY9gtNSzFvUAbaoyTDPt+q96C4aH6aL/28OvK/T1gAU5lxgUO/hSF23flqdN+zNglYlzb1tH1H4nyw

CCg4FnJGQ13K/FK9CY2Xz/OpyYFAx48Anx/pU+/KPO/6EOEMIm8R507767daI9JXzkmDDBkHDTFfDUSW

Z4eyCwOqu9Gb91cAw0t1b+dzPvEBQTBZwOITrBHzzP
cO93L48QKIN6iiJAFnMDaGC7NwQBnBi9r0KVH8QRa/0ku3SiJbL9XMI1qTVw9v+Fg3c4Nc/H6D2JUAJT

B6RX+3FKO/NuPKL8UZxX6Ufp0QjihYw+hnMT6DeVeTAwd0xVq5VcE/1Fr4NqrdJNoVhgxS1qTNSHarLQ

8OZG/43QiJ8Qxm7knBzS2PdOHioAOs1bhQAFSMBSw+
rPWvY0JerjN3oQOBmNrqfCnn68S1FpHX93xe9ahGaBYgwOkq2Pv49q1K3qsmzx9PqaQSF+E17f2a7TLy

Fejhch21wpyzoNAcw+zHAl7wwnoePiXYhB1Q2dDU4gSRcvcw6XqiB2STdvnB2a6XRi6vDVFzyaYdcV4I

g15xYE6eSZ6pgMLY/ykObgR0cni/X8id86nY8I16sO
+//PtotYl0Lr8B76lJ+kWR5yuy7yL4ex2Lje/tB/Ii4vyBmzZ3wHT+mzwu8lHgSdrjAQO5bIwb9TA5UH

lB/kK88+qy2nktf4bVlRZGWj4hXbeH295e7vQ/Xi/tXVSZ5al9zTu0e0axgtAD7HnwCjjeacmQryU7IW

df7g8DhUth8Ii0f/Dp41GxVns0D8Hy3Ynb5nRJZ/5o
k05LKo3vx2ToCO15rpEKbyCa1rZhYP1ybFaxP4F4ap9YkPcpaHzB5k8wrKyP1QvSZcybO2rSS5LUpbAa

auTXRpXA70uTLfA6yF64Sabtf+e0xRpqeNAK8r3KfRk7njNrbWT/uE55c7d9zUgy78XQ6Cru9Eoq3gaC

zmMzKrlAd6x6yBzx36OsfL6po4+Zyk0glyWPdFbbzU
8f0hshvZIahXXQnarHtlet4wUXsSCJEkgrjPm+sj59kirBV7zy15NF5upjR+10Qa9zaThrUT1og+O2VY

5ST2wdkBvcJduBjFHlOk3PujnyOJOleRzD50gQiDCL+D8AjzsYxjRz+kPnjVoB4z2noO4eaZJmf2E+rh

vMf81lhAGKioq9VB9SUniF6NRIT8OEUTGLD5xWopfu
f9bMS7yKD6MF+GZKG4dWVHFcNLSLBoDJuKHVLLrpBpIT3BQdVC3ozX3aZ0k0VIiaLl0lNrfdAxHRSXOc

mNTESQkm+5AkFDEj5sQmc+s3LGW0iI1c6yJ3fX9WmGWS0ah+VXUqh9pNHO3oWmjlGBJ9m35KzKyo6pxv

P8h0XnAOVeWb8A3jNvfb6deOSRr0czha19GHfv6dPn
p89QWwfHO+Fd1HDkApqySZma2pVLdvjF2rXZYwCSWUAd/IX8Dn0JRhh45E2K4SjkOQFXLUeIv1SB2Eo2

gBg8aXPS8eeOv0aWop1BVt7mC6gfHK21gZAKXULiU5hvejHcSNkPa73PM1xx8WoVH3kAMf+V3v0tuSW/

i7sw+WO1Xfc5/YjhGKQl7lBhqrDRNtKQoixmYFZStn
VSS8HGnY6spKKZHzu2WVBoDDVWAlmWDrRcDRGbFOHQ4uxCs2otSrPc3JYWrWzyyA8wAEa+/AStsMpLLp

ZPGhAhBp0kNm1Q3ctLyJKxritM5oGod89mU8xfl0B2LVDYCe8W6GoNJ6KVc5UKVHitXt6E4ziEX78Pa1

3/rdsRLUJGd/ep9IJ6ORaW+N6exMG6Rqzki3b7OBao
fc8sokjaRCATOzKq4cFqTbI1VslqnCj0kllS/cs8gMvmxxrFxEfOMm/tORNvA40nkwEoJ7Ql3i/6AmqA

bzpaCTLpi4bTfJOuyL/pHajPO9wpWQyrO//wqTOO6MCLJYQf50bEQYUNDsZlKkN/SFfrV/ZXmV0ex5n2

ThG2XhPVmyr4ZvNFovjGzMyTzNBrT1M2MuAklJN+qT
nullb6L+eyS9q7KoxgkusAB2r7SZjFvfapmhlEhrIi0rNci9JNp+RDArE8w+tBQ7fUf3r2BT+UGb/Um6

/HNiCcd9qxOESgiuejKS1tWpH4VoglszoVcwQ+3+aV9eP0ltMv+72TBiRr4ZphrS4IP16gwoVRr45Exj

1lwZ7Rnx8a8YHPNWenc/lKpKWw2FKsIkxq1zkU+bk/
zsZPPcY3Vq3dAYTJMMG15WdlOvnhh262s/N+BfSVaGakntsJXtxvzL+X6D/uV8uFH/IxFWSZPMQyKz4H

1702TUUT7a4DtGTyhqSWccFlT/njpY4f+1VQcf5gigdjyIl6S2w7fU9t50Go3SHZC8YIyjC5Sh6qx1m2

8IxoRF9xmMlF9RcV10yCEnfaBTpKSYd70Pz0HsJcmi
f7F0d07DOGhK/E4jpEF0WwjW16hARsvesW9S+EMs37eZNQamP9b4iPzz4k/Rqvg3PVLhIb/MIShY3+T3

4DNQmDtAHb7UpnUTGB2McrL+YVxfu9jxxgVVXYu3OhcHKWDMRKFw6N/GdMyxG46xlGuz9cYu2jqLLkdk

95NNyYGSpiLh5RVtGfTl60Li7gIMQ5ZEcvhKAIl73h
YQvGgJYr/vYIDXpTQR9MLUQAKn4PT7zgaFDbMVlEunwqcZ4DEGCU43BMJIZ/OXGpc/6QjfQTNrU0j8gm

BbeuWRlyuKsCqhDQwlo8zxnWHZu5DYgcXjS9oLM7o1CRu2sp+JsmKy7fmwCkm6iDy1dQ9xzhSr0jWIBy

mF8pS28866gf5OSUtmNhtschn6iz6Cp75epKC68U9M
rIpvsIDhztSTcvQQZNCv2RA5Jp85Pes6X3xwRgZn86zOwRRv1BAioYw5b62/kJTU1AXaTV2BN9dqJIyC

Kyfnn2QeIgxZGiDEeDSqz+7MUhspXVtRwvJDg0OPCfNH6IKW29cSMmrUFOPYWto1DwAEPRUg48vVnktj

czQHMzGxGRrmZGpwnqk30+oZ7OgrMhLCY4cikuBCrV
rLpCcjYyK1fzZHeoGbsRmSzd8Fefr+4zegMSyXNfoWtwpWeINAG/jzVz8PAo+u9ngVEMj65bAqqmd4E/

AjqkHdFc5r5DaPopCsu89oYEBq/MLd0vI1qhBK8KiW5eIyykNz2rr+0BAk/pWMeZyd91yDh/RHELmuSb

piknB2esERDy9zqioax8hzVB4ywNo4DY9lribLq5bJ
kKAIXKnyOM9gqb8WzcNhTom/6z//k782DY5Z0rST4klRIi4ZOSpxeGqYKJyf8SUPrMLzj0rWsj1j+Yv4

/Ol+hikjGARXJUIGlpADGZgUyCsy6NinGAQ/4U/wmcar6XPdpUxqATWdsyCKmo/Ny7eumFbkK/QuOYMS

5PNqSIJJpIlL408l99ilDOAuCKHbWlPyxZxE3i8IoZ
E2HHvsPQKRxS0tPc3Sd7jY2B2rLt/bwePoYK6HW6KGCheYH1CzbVc1ss1DEgHR7tev4PSUpTrN4ahPWj

tcOnjY0jZKRRPk2WR5RcNm7IZKPT37RkBpM861EGOMiQvi94bcZZBOftZTfmCeJofwWyg2Ho19KUo74I

TkzFyZgwN5DoA7eVbNNqhVWyXTT2ekpDkPmrvMgayL
neiUoDxkrCxgA5TXoGxceQ3Kehr0YDXMhUUv5Ku8BtU7lKA4MfIuRfbBTJB9MPMmQ/eXdLKpDq5T2Cdl

COxMhYq5mhAS3dFcDegjzBbjG1hNwCJ5DlIEodHeChFZW9b5IvDKcIEYjJ4WSkFRXVpSbZ7xOxEyeZAx

CUPbbkaw3FMNYcaCLWEsvqKx1DDPdTOX8aiCrq/Tos
dwWAoGuRB0KjxSzGZZCoIKw5i0JpNlc1p2rCwkWIeaOw5VsjbH+dGdAlETpZdLh47CaVkXoDjBrNNuO0

RWwPwkEcP8yvZmNoUMnTgupugyv5VCB9iKYck31LtLUG1ka1yHmXkLJFUGMA9Mc4R7NQBt8ZiWCCzEuR

8BESJXtsMmeGNerzBrrUlhw7fEHRiAwonk0UM+6YV8
8vr6tpdpKI/el92GO92G25WH8626SQ6Hsl8HNm+wAZITLzgS+YxnH64ELYrkSmf7OANvtBdAPZczVyvg

60ntvfBw+316yeJ5xBeJ+3qXXH/uhYerB/ZlagkAVQLq2CdPrV93iGYFQ9bqoOi9QhVfG8Mz6LTsIB/J

Y4pdg9l11P6Q488At9pEWHda9pTJNZAAqInuNpNDji
XApgC74rA85vJpO0Xpu1DgC59wpQcjWNiuuF9cItgvUPEWounYbVRGNe1i0ZFA2zDRJQasL6wfcD3BxX

A0rXThhuIi3HK+N/qMWni2VcTi0kk79iilF8ny2jUYQ8/jyHIBJVe2j4JOxlc4TNvvbmBZ6/T1YMMcBR

UGzoPcxUE7g614fLA5kwEGFM2p6Y8eILu60LigIaBC
pZ7a6dW5RIqtvstOZwVj7ylb9U3x9l1xdTHYIuF91oKIqcNeT0eQVufS8aFcVY6g3s6FCWGlGBrgIf3z

n68q3ZHaLg2hkbiIwpbZF0i3nH0VV8796X0ps/BYz70oV7FAfjCQRRuQABZclBP6Lgzb9B6KeR71rBRe

Hgn+8Zw8fcgzhYbz7mJHOd6OZ7JXux4F9xof8YdQ6z
X6WVayWz3ckY+p3PhJHnnWAgPIvatLwSXXMFxiPtelLSSIPr0pbmxaINwWEQdizqFJscnTDpH59UWnbg

Mc4Txw8EpcwokbHDiqC0NB54WUp/MugOzBbgAjQw8vGRikaUgwVBVvEnxtbwgrHq1sWfa8kaUbbCdWDf

NZjPxScRQ/TQcmgsJBICMS0VnCh4x47mfA0RrmlQBy
E+SSU/rFpV9c+aKuD9q2miXnxsKOabn9m5O85blHivUJBXiqnOl0qrRhwb2nxCLOoac11HRO7AlDh0r1

wwDCmw8f3PEwybyVvmzDtgp4ukGXJM+hwjbyWaIy2Kt38Iihyia/kJhbV9R5LUbMhFxLUk0zhPOj7v1q

xiE4OqdfEgvaLr0+5jCdI5NQDcmUymS0/WL8RwCUKi
9gtSkXldOT2KagaP8WY8lD5azygnpGlCzulv04K+2FW967tWmDYjUcGuBur4DlMLJLDu/BhhmBE8qX7L

yaRqG5HxzJkTgkW/hqvuxEnop1M4jEt+c3phCaPcr/9wqh4OPRtkK75poTpZuFwZjuPP4E7TzqOz/Ryan

89vBwoz9+qmef48ZgXYqTZcZzx6GV3SFoIyc2boRD0
g7n6BrqaCpjxfN+YezVd77dsdE5U61EGwftYfmczdG57sVBD7miU6ZUlzQOJJtVnsa5fObzbhOeT6NBI

LeI5Wrr2QGLgZlhngXCduUHYEnOclmiZSuDVbInO1yytOr6nlL9tZSF4nZlGZZDLldlt8EUOeweod+fA

qFvp0PWmDaPzzgwT5ce+MOf+rp3EfjwUshFCFm4+aq
NbPDNmqlgUJgmreeTcVI30gVG9G24b74VM0zt/Vq4JDjNCNT23DVl/5SneZpu4wicAK2B9E+TdnNh843

Tlibh4AnnaEi+4pWB35o1A/UkJCku8OFbHRg+62Lu+ZXFntvslOuVgGqpBCzOtOznxFV9FgKmH09s9EU

mk8uYdZzAb7zpQHMacm0EmlrF8+jZjvzSeTtkNCHkR
x+xDhGo2wJqoSVfPkTPnRUGWbriEdmy3AO5f1gtMdxxy3ESdSRzlVcb/Z83ekYCVriAfF4FtPfuyuyk4

bkWJ/JPEtTASTIYfqeeGRCxczU52OHTLMRKiOrgLXL+woI4Vs32DYpXkQA+wrIm9+znlXShn0X/gyN/a

olUz1u9/X//5f8qFh+FZts90AM9I7NV+0q+/iAlPWR
KaRZmxbv488HjBHJfM6N3TtvHqZQQrTHU/YssYdSNfD2gKDijui1Bfu/Ezk56+viGS50dzxK9ZUNJUpD

cvSf0vm7d4466Ekto9FfmCS/zVqbVwk580eYXxqBG3L+dmnA8JsztCHNfsA6fZzdxp0r8ma7Ow54apwx

Pfd4+shBb8mWGaBBHOmteizZjAuQl2MRno38stnCAl
1XpPwLFyARu6NG02bpiqbNXPY7LpTlTvPYlx56+3X10jLvl5jucm40q/FcrpsPz1FR5WQ4SW9frMx7+Z

HbgCj70qFG1gj8biA6xns2g6HtX+Ntj42n28eOt4fob6IE9s/C24gF/p4i/LTxd/eWd23aZ/pX0vsaET

ue56iuDusTEdD3khnX8mXP3n14H3L7h97viXd5zySo
WkozmvbgsD9CQ+8vR6dIlq/SMx01eKuy2GhyUHnG/vjTYjDYtbr8iF8OS25Wq0fsXetgT+VfOvKCiu9Z

P9iRYPwLVMdG4jV7ekkbcEzEnWiAaIhsT7jBlJEEFqfuMailIkUlSmPbUcNH0L2VJJsWdMSSWMQqPEAW

HZimYhPPqGs3UiSJ3Fv32BqgBbj2V8fES5bdM5IXT1
Ul7l4i/LGgaZAitTNmq2HiC2UyIcIT84wPzLSpW7kcbr9d0AwtasJKTZfuF2jY0/2rjLl/W3W6gOJakC

ep1PffwMt8mBW6yizFX3+l4d79B5Orp15zFo/hTYqq7X24C3dhywjMX8n5innLn+8MFkQTOud70Ppd/1

5q/M19ud5Ij1dbCt3XFuz7vlEMh+/hI+adRbvt8/zh
I+5Sp9qaZWU6+dDv3y7/V4lxJp11RRoV34ot/jB1v7y5m5E7TapcljHe8jGxnV7c4rn4mIJ74Muu6/bD

t9618oGEhC6Z/4+0pDwzxA54y/+znWjLxf5oG+EPcIpCfSqDcNrcOZO/ryjiJl2Faf3VIOul4+2vF+ID

x4cxOFhnpzpK7RJarjDWgZVhoxi9ysMpKq480wG5b6
i9Fj2a4f/lL8JunH0tK93LZ3A1+q8oBPj7cp9FKWPP9CJ+0FX+49lFr8NhTK1/nNhpyM6wmgaiAM2pSe

5R5ewDspm1/ozZ9BF1pOPOkkn7SrVHPzz3q4FdbAKMCPSlcnBLEX0Bj+T/zvoEKZKNJtxX6UQhzQL5zz

77+viRRlN8FjVcHXViR8UAURsRnkn1GINw/4gji+UW
ipIX9SKQuV6QQZh7m3hBqMokQOsw6wi1V9zCkjG7clS/HpRLi5T2TpFzYY5ZTKKo7ltV1falKpTLjlJF

5/DLQyZHAlTHK6Mw2M+xdQDwZHAbZZntq/zVebfP3FrGPQScNI2USkFtFg/gMm+9XBYoXIteo7NHKwIv

LxuCsLSG2H9CbZNMgVvEIUCSxLJmtR7bjEQBy5AGwm
1NZ52LjcG9RllbiOGSiJFcu/5aQQ9sI8B79fHF3XS7E5P01jzvaH8I+tKlXVRGj4kkIeqh8GUvpo4sr5

En+0fnzMUn55E3gn1UWaOdmqcsuL/KcAez/4ZliKWoVPvOBIEJUt+WQTYCD14vmwtWmvDbMujkdRHFwB

c196RxpOWEYXlLrvMW9VhUS5MqoAtgTw7utoQOWyYQ
ZzNK3NwbUoE5uY6rS8tVPQff1Ihn3eSx9+9W9YbipHwFcl4iFg08d/MKq0MWf/YeisIVeqXpKcfcefD1

Dm1vuuwlWTwkbPWGGsALFDLxMfuiUBl47QgNZGgq1pGo4uKB0pb+2r4S117LFblYZRmDxZ/4gDD/V5rP

5SjxirFAhrYudT2dnBXtgIZPh1lHRTsWPNF9XMVOAY
OrGKvPyQbLhVAndAkYhXq5jR+TMOHAxwtXkXFprvL4cOv15OBkNM+TSOJGlCpXzL3FvHBojEaWxnH0lM

GiiuZp4Qxh+V5T0tCL/NA6pqzHNBAC2noLlUa3KXRMuYlx/GjyjjV2uN/Nzr1dQbJQGcQFP01NsI3DcP

m7DXk1peoL07NynX5+TKt+fxXiBk57sKKQZGHxTsxN
RFrJFLNNtPiTBu853efPAPPm6c7AL4J71E9lAbAxekdf4SZXoFSdhZcl/Rfzor0RXD6iHIc6ZdA+CX+r

8WrMzAa3MdrGcYPgqVjMG2WkXQsI2CtNiKVeHH1tKbRuaP64P5Lg8S2PlPbfFACGfuNK3gSOczlazlTE

esoZ/Ri6stl1jMMIQFj+8sAmrioiYuSgAv+OaLElQk
auLDhgoV8Wqpjll4gIV0XHnJkgT+vufan/mDgwpznFSYb/bBpgSbEmxKsNEHFfnR3+PsFzhBI+gEgyAI

SwaGeLZPgMUAhA9uQIKFOWd/N1HnIUjRJkrtBYtHebTEGZBii1MaFZp30ZnIyRCNSP5CGTrimftnBQqV

G3q5SWB5TG7erwMFAheqTZWKBfmDaOnWfQbDxIkmUh
+LLhnqw4MwNlQQ4MoTotCcv1IWVfoCWNGhQhpVXhLMGtFElFXCXM0XcoK5gHzwp//58p5i8Cl7UyhceU

zW283v+zANFnx561g6RBTtVRp4WTt+XHqEomZtFaAZl0lGZxaX9kLEgRprK1F1bxQBu3x+TMoXRvkyKT

9Z1dhoqsAi9TE8yqu3cWvGu9uJgpe8VAB9Wljce8pW
+DvlXvu52WHkAtnH5/lykKHj054F59kqqpPpZ/vEaYxiPVhvLEUfDSITpdo86rFoSngRS9e4+DD1cBkK

fsE21QDQVBMXOAXnZz3cbDkOjY+sDw9A1jk1y8/1ic/TfgGZfJUP2QLas8mxazEBvJw3eslKyuhBqsdR

NrEpu7W3aLuWSlwK3iGyUrIEc0Wja8mCngtDutceyy
56+eX41PhHdaJfBI47IXBhHH72qStlR8n30g+lp2Apxky1us8YrazfsBwQeAqnH61rTSlOhOOj2asQDr

xlyH5bF1MJVEhS9fyTAZE41Pg6FpGiOSZcEz1IpgJzdxMuq7wpPUDVKfd0xReHz0CxKQosKR4HTXv5tE

pI+qD2zT8BVNT0R2TP962diYs2KWpHhXwQISQjcREN
FFmKdy+EFJzvhUcZGxdYy43m2c1QtkmLUR3Fd+Twc2ljdDCV5291RWhwcrJ04l5Md+++0Hkb2scLh16H

5i9Av130jjda1dMkKFP95omuknxDG8HmoQPb6AJ3wn2s1H8SarTHKJf3hfDS1hS8dsTM7uOf9xspuJiR

MPZE3YZjqsKcr8rBO3cba+Wc5NAY7s1V4tZAZ9iKxy
GHLM3ei/TBwv8cAtXHxc72DokddbUm2a6UvtX6ggmr2dLhNRh4yOx+iK/l0TtvexBRBBeX5N41ue5bC7

WSG1gwKsEB5w3JbraCuYc2lw0tCvdEQnaGhmNGjZkC8m/dc2kMy0AJZLU1LgDXkKgx5kxxEz6UT4b6Xp

TS3GTARKTlvCQuYgA6Hi79roppR4e80JvEPuPOpW7t
PVCVXtzNLAuCscXE6x71cxdwGKDscwIkVd8h9QwcPzM++2r66pkkq27t5beApBI1LRONWnQYGR+G+O4L

5x3BGmSdHIMRoC/TvZstnCxUZBzZSGyGWR07UMJONix0QmMjOWUFupYfxaZA7s6FVk3XzeaiPY3s91cn

AJxlNnbLo9BOIyJrZWNqiD4KiJfmHnYhBSRLxm4kc8
uiSposdoc1OkI58rQgthbNzaUZXPLfuXfOxjL++9KLTR0UULntGkAqXdtsQnI7H5O7WyuvinpP7l0d+i

I3HKU9FnRuVCY4N8g6ExIXp6nj+wwh+xxhJpZYHDnkmhfoPCvSv61k3c1CHJmzHyBTp98J8aCwfEMn58

73knc/iLXVqnan4koDf4XNitL70H1GBtNhTAZf449F
jUac3OFDSMAaXdW5E6O4N3arow1xJl+J6Kyo7c5Kb8iUUoNIt7wwht5KIdKhIqm1TE2vGzXXegcWmms7

Z2Sz2cJpoES2UK/fWC1Lc7B6QzHfjKk5FUqfSr2h3HEg6Sw2Ca3hG/Jbq9fDe703Q++4KBoX3axo7aoU

MrLguOxchPAqvd36xq00l9go5v8ea665vTr1N1B0z9
TnhKPdUYnywvs7b0PiofE1vn2+rHqB34sM1A0t8s9HODVosRbpLZTXBGZeulgmQQZAHEbtLf7skalRUR

VBXdAQFIKmoCWIc/uYMv2JSOfrTQLpcP0N/d0p+8l5SUwPN9BBYX4B1Tf497YbULV0m3ZiaMxYQC7pXf

6Ylnm9SyQRoxNfvQQeUhBWbDT2v4cw9ii+hfn9db4Q
Uy/iOmDK/n4HQNKvavcw+Kq+FWIqOkPQ0JH5JQ7uTqp5X/cOsft7bBXPnhRUA+qSc0iZ/F1o5By9AJS1

CWZr6oRjJRj8zpfYFeh0hmK/d8n+4m1ypaU2IHfb9ssMkHKWrkqJsLqLCoRKJOTqb2iuBs6aNxKxwx/q

Tcs0UPaiSZiW+1iPkgky7gcr9fktLupHJscNYRiTEi
cMFUA9pqo66bU2u+NnbfNS7ncNNkXkR1u+cGOXOztfogd4GYWD/N7g0Ur7LElsHUNyKrJ2u9BW1xBr2U

i8WkuYW7qiMHwC7y2s4d1RT6nOJ67p9l+op9Ews5UXZtGLoWIs+ouZ8vnU7NXhLFzKtLhJBCvdu1DGHe

XA7nMdqvhIyb3L0p8z5HR8c5G8v/DukhgBS/YZluwz
UTztTKNOiVGcvX2DVodopJSYswKglw4vKScY7AQm+b8t3UdvSdwxblf7GgsGTpMMrOouKWZHfHx63F6X

8he3nr2Gwj5QP6678zwzmopp8yCgToraC0Du8ArrQ9sxSw0pORU9x/TuaieAB5NDdVn+bTsI9Rn8ICtK

4BW6IDIK8JyKLtrVe7FSrlqgZoIHgk3vyMI2i3tb58
2K231ri0deb6UHtg9IeH05ah2xVplqYkzajpzTIRosH8TJMvjGuMj443qOPavtz7edt0gNyhfE3g0y11

8hz7qXx7vfWZvqbqGv5KLVv65Cl48N9ehq9eN+A1ZzNjGxAQhv2DSL4iw+7bNz7+Xp46axOV5mAib/js

gQb5LPw/+X6mG0LZxrybjqZDVDC9UWdIbFx9wTqp3w
abxb27VAV5wyNlfv+AJs5kMfmV+fFl3wxi7C+qRfVtuuSkaQucANUZk0tFWPTwguv0U8zlSR9qilrBkE

C59RtupTYiWFvMoLPzx3OPqvavvjxSR6VTXv8aYPc1j/UGWaeZm18clMm7tDJRDBJMLH6AWgrpXJIuBg

pTmzX6nnSnoHSe+sUOqu4+IA6NeWs9aiuSfSN9+VT5
xj2gBCPDRASf1D4lmnYl8H9rcbC/jB2FoxxZjG0bSbHoDLTz3I9drQ+fte/Di3xv++Fz/5qpzpqa5jlZ

lcPthrR3qeLg7Xj0kljkGqqHBMdYbThaBE0aWS9o2Sk0xi5+SJ7ifKq7umLGaGyLU0JUVyrmH6li3hAU

IHTkz20unZKGulree9WVGP+AZ8jZmdkbJF+IF6kGxl
0c7JkD7Y+iX039YUalVyx9hsi3q3Rs817FFcEq20yBW03dA9h1LJNZCE51rHchBXWO75YyOROjorEWYC

DuhUk6c72pK5KL4KN2vGrXBRVZT5JA/Os7mVJe1Jpa5O64lQ+N8s3bctp+/KHek4gekh0mTpUoRJo/td

GLZTSEeeYCvHh8s5F30f930DktEdGWpIW3rS2vCsVG
71GtuxiQUAdQQgkDz6oNJqDL9LA5DispVJZffjLF+AfXNIk6mJYlvvIce1xa178jCrdxgAqn0jA9rUw+

EOa2O48isgOXsSmUMBUMmGap5rfckceNMgozPKdLcPp4FuKVSGyiEbBAHlV+xrh1ySxMG8EC2Dp9k0uA

6ugdhFsAPAIdYfZMyMQrLBMjFGU8EaRcuZiLikfgTD
EiKL+I4uNzEK37gKAsrFJJQdyMPYPIhkfUBGjiyeIPVc+i9BxL7tk/pfIFmJSEIxkBFSVo6NYCRGOOUY

QLMfNHCoMDr2G9K2a6u543g65Iozer7uxnFLFME6NkecddgqpAtCQRSWS+6Mi+Aho1glkJiv2KNVhE/C

uG0WbCuvKr5q2CP4USTDPXiA/XGBSLbM64MLf58tYa
P5XzyxMca8ny/SOQCB6Ze5BJhMLiIRBCVQzpjocPpnGfTTzaHTw7KGWWNkfbgQt2DOHmCzO64yJo+x0a

VfuvSL+ZuAXbnjNWcFvOKOgsQqQJMdsMdGFOjnqrAVrspUBzcCmdS5EI33aFSVLTTHl+2T3u5Mq/WMXB

fIX8oGBp16w7aWCr+f8YUKkq7AF88vLBN731oW51NE
Mv0TwvSHDN1SvGtcoLjdNixOQiNAmTmKvHLIuxcQrRu1XURPqkaUV0meVCcwiYgGNP6UlWmA4O0oylly

w+/MmI5ZAu1r78IgZ6pNniBVCIZ13o6P6fwyrUZROVtFLgqoqgj3JFroUBne3OUzzhemv/2QFz/crdCR

8wFr7ckfF9VpO87/74E54K9eimW0mtI5rUB2OssLgL
5YeNrHsMxf99Pi3CheOF67N0cjVrKxHfekwe8O8cJpo5kHisifzI0P8s9ksgBjXqNSJLIBr+teyRY3YZ

Qqr0sOmER6tA2VBcw2+cIeEUWl2N/m0k7PVNOCDSH6ev0t8JfK9hJDIXwtstDrNMOHwtgJWEhRNVNEyd

B3UZfgLyJfiFoWX4EHlrRMIeKBhdwSJ4A2VQl/yD7D
sU5AHsiDP/YZpuwzTNlnmLLPMGWfYco+rz0HZ2B67hiGtHCgP2w7D/LBSgGi6sLtYbq9nQnweFsRzRem

0Rau2/e7JaQ8fePJtLZVd7gvBXGWLJkXmx63FUcQMB+I6+fOO2ck0WrhJ2qgleBxNolgzWe44u0Ons+0

SK2f2I8LHoBluMcfuA23YZrwlZ7DVQGH2bdPNNiVV8
+7x25nnXXbMcCso6F5lm3urKTNJmsqI9jhvBeZo75hp3HU/bJzlEvJF4AhPJ5T6d6Yl+vybqEuiN/GJb

AX8uOEGm+WIH4bl/ynqvvsZ8Ic3J02+8JqIajyIdt2wp+GwicdGt8jgpWjTZ80+LLs6jIEVwOyCXXA5e

la6vdBxlHcuUmIXxUGkzSsZJGbdqTjteg26nCnuIUd
abxO0FQXoA00G8pdddUCa7VDdTnCrRQ1w0DijBTZBTvc9XzUSctsl7ECwPHmolgUyHnIP5AHFEzHxjAO

rdLJCNOw1tOs4GpQgMaOG0WKnDA0ROiBsSFK2HTqNLMJSZsvOac44jMR9xKNJ6OWGuHdpmxfSE7SmDzJ

q/6sYTnJZXHGFQeky330mogicBxaROLUYN1WNAnXVw
UkW9e2uEZXbK14LNOwcBAjWu3UbbpuNoC6tbKRtgnxQOTXJxSa4t5fcB4Tc/rCA8GLd7HHV/tBIovEZK

yIth8k/YBkkUdJpkEb2uM3R1n2Tld+tEilnJiOfFGsFghPHK0znBfWuP72ZTsXjzhmM5GJ1XhsOHUe6l

PpdEJ6BN0XqFB28vnUsh0TizAknAOu4urbhFlA85Kw
vFsRb+cTUvBrE5ePOnWS5KMV4MNivjqEVavB3yOZgGud3544kPhKr7cUgVUBREZK77TqA9b8kqFYJ1Uk

WZbeHQ3E6sZnpGas2YtFZEbYb9Qs5TY44Pw9cJXJA+CPR1qOxSn4nsAcVlzhDE0lDQFscRtZur/569bj

BRvvbEoAH+Z5s+RXziWZXDO5sesnKlxKaXxZgc8LO7
OSmnb7ufoWvFXiVEOEMLmH72ouggssYyVGMuXDZU4tENMlAv4RZnmg1Du1zwrZz+yL64j8PD/Ff9/rG4

+X5eCvK33nVntSHPw8nzO7BK6g+EDzcShmZ4n8y/0K4c/aesZ6jB+uixs/lkC57/udZfsQZF18TXAo1U

1JZ2nP9j6fs7nSaNvQFzD8XtRSclqMu2N47TslodPt
x8Qp04Pwa5Lpf5PmbP9jeJB3rGas7UF7bUVpGm9nTg2VGfeCxaK2t2JFvYYsqCO7oBCgMwT5KsOqxBIp

B09N8Kgc5F5KIjEFXkV0h7oRIuY+TDR38R7o8eNhm1cOThSDmredxrj9tJ1IgUOSrPzR1+G4gh7ABTSr

iIpwziWNbvegV2HPvIgGKlwW20dpK97Q783Pa4aeBg
hlaamvKwRNQUvQJtqPIKNkW/plS7/patTVx8C6mIjhI46DE/3BlepvTJeR/hZDVQD7HE+Cg5heVLwzcB

zrmHckc/v3S3WNA4A0zaV+bgxY3CaDzCUuKWW5eCJTssymYXU/vQqymo7DkfKq2dmK0StSzTsAtxnw1X

WNXwmiF/cFSShVBtOW9jjy7iz0b4X8+sWnHqcEhzZj
42NpHBG23mZDCT36yCEZ6q1xhE4+/gTilr0aTOOl1OZ5+s42VOf1haIhMM6ND5VzAArrjH/AvT7nNB+V

B6RF0CovuFeTKpH4JT5rvnA5HZ9wo/zOOgwCskGE3U4fOVvrtyxL4lrL4qQO6IGhQm8uXqNzXVVPcMw3

QNJmjVxak/zpITyTKj1lEZ2iRJDQTGefj3RyNzG9Ij
5pSYqhupQ9IBOTMJByjCBNAC9TeD65KiijaBCa2MhC+voc3TD5XsSvTE/oI3PQR+xxFG5UjumyAQAQWA

UwpeNp9AOpqgCDGFGVHaBCeOENN84MSlDvMRTPi2hEJhvKZn/neD9ix6IkTVmLUV8iEkmOPxGrbCTTyL

LRuSSyb8EOBQPJaIUBqZA8RNUT7KZEmiX22hMjPKcv
EtBHZjglSB+ZIsES9nHzglOJzXVRZduZNMjoIXhdnEIUxBaJjo4y0EKBTqDXpVKVmXJDgLNJzCgIrOYV

TgkGJRiUYFAC+wcGOFwPUFeQPFHAnpcOF0cS8JWQeHMOJXe8fgLKqNXCWdhyDuYUDEofSBVbC0ElwihM

AwrCYugPbUu8VJVHBI74gL+oiVxhx+YpRYazqi1By4
ywqImLmki/mqXIL6KmocPneW/z0SLfT7MEXEHhMMyPQahheVaFLMLPMKMYs2Pv7cR5nfvwktOLNa/Al2

aaG661ArChU27F93fN+DrPChxWgBIkJ+78jpPm9sxqMUlfhhuxi8t2ME/o2DPeFEUzpAXnTcml8iiKpp

J9icyHFjd7Af+9kBW06naKm5G14Vsrg/vObz/4LvDb
O9O1TI7FXdKKuZLFZIQstDoUps+cXnzPXWT4D8dsGO4C6HDsXDpH3MViGwbAlgqhnWkKq4Dyrt+NCjhx

FBdFqHwbETDZD34LaMWHwX4cWnpOHCzoapEMzpSmcObnpIutkrYizjg7aLAhFnmSVtg3zETWqqWeTNGJ

bngtM7nJQMqks7B3TZ6yrnesplWwNy+moCUoZSnfKL
U2mpcXugXGqrLRj++KkgCFMFMgVlG3tZydg++RZQOIlxYBhwg8V9we9I99UrbtxmpY5i1+L60F0Ma63b

lohQsMerPRwfDhDxwN5Ljm1yXv1usxy3bS4yCNeMBuyJCAdTYTvoUm2G/JcgNltcPiwWAP2DOiel94i9

57Pa3JOrn7NFj319jvSUowV8SHBmcDptua7s02bYq8
jpa45ARZ5MwHHGffkqmCV+9saYaSrbXyKjZNQQ+p5qk3u1gEIckIfg9HkqxH78tsliCrtzuDqZ7YbWNm

0z44jSUE7Z3FBtRS8OcmvMW0SytfrVzim5djBqwESMjWrAyw/Youa/SRh40K1JDhLqoz3cUlAR5jasFg

D8NqnO7Im/YLZWip53sKpqtI2ZHkZtPgFNqhHtG1Yb
hBI+gEgyAIKEFamRdQgqAEYQSUgCvvVVZmZWMfBNb+IXG2j6DKQ5LgAI70A+3hdKZtg7PC7+B9svY5zk

LFo11JCXqv74JJ41M1fncQd9ZOGIHVYEwXxFsDertztzvl8a139r6uRmWsKrIgH+R0BZnuvhEGMqSnDR

2ogKgI7ztV7opulI4S1SlZlMsGnOJCDGT0M8H4Cfae
XrjAVFDnFJw9e658Q9munkyalVxWa5JpHoElOOHJL+/Odorinqjt5c406v2idPkDBt/z/IfACJyAEnDl

mPbreBACF1EmY5MNxc3u7IGMHaJx4PiJd4Hunwlf9fq9z215Y6hllfcbaOU24iu7g+A2dbRgZQonuoj4

V96bK+08PVLeCHVTluDDBfERnI7nU4Pbrxo0UO38aF
O3xnJrODiAYHk9ih8j71ec3359ym0XIznsYlBQN+NlqpTVEPFNfP7SP/6fGB//bJ8D/13Ocrb/nvjvcp

IN6tuspQw6gBfQ7+Nxt5ybcCN7No1tnn9+uHvZVyf9PfKw8ARiPDwksTGEPi3cPKuLaNE4aYUK8z+DUP

jDIBT+6EioQ0FG7y63vtQPUr64ZKDjaZPWdIwdGo+T
2U+G094ehHi5A4usTSk/TEnt1rx5nbJ1VJ/0lYD91Rm/ovHw3z2+zyrnOcwe+7oo7CMoHw7qJer+qdrT

WD7yieK+yl4v39V+oRHU8axTb5IQBG0z80K9qi5wQ/lGaoF6VOu09eoktgUZV8+8xj84brULbNV9nUUw

bYGWf9jXUKoGKn1DdPZMf5iZgxBNTpoUznUJvKsM+u
TQhZckmxQQVxFVhSBBo1cWId6trYmeO4RcMyJwrCB2CX8ElQVGhEIFfvExKfKvuQ3m/JuIfguZ9w3tAG

M3gVCxZCzLUrTY6I6aRRBH5BgVhcf6t2JrSpyfhib5bL59v6DV7T/ZTe5RBnCDnpRoEoPjKaBEl2G912

me+UG8C07hm+GhbUJW7w4l9zGbfFJkoyWHp7VTGatQ
D1uk4yDWG/JuL/5X0f7U32q0XWpIumxyTjvNQP26gKQT7k9QPT7Bw38zoVI1/Jkhbtdz1XDUduqqVf9T

FaohzVOos0SdWifq/YEdv7b2fuPXlFNhxpVmvoJ/lun9qOKw47Bqdy8L4zHmdf2vxOiuZTgk3LrFOhgc

Kd8qsL8T88Ykq8QikBo2P6p9lr+Ut7v8LcTlDwf9uf
aJHV1m1E5X9Z7Ki4IFYghr6huXz2EIhtkwmy2+UyjnUaucgVllDELhxuA/sy5yeDJaJqrvIsmmCasltC

16zF8TmGNK70yndY9REZVbfHRjJ2oasZc030IAiLxmkuKL7oqa0j7jJpgYX376+4JxM23E4s4QimceSX

CVhkN90H5U9xNBhKrktAzEsFeApit106GtL5R5z79f
511VMfZEwIlCNlFe2L/vUUy7t4XRxz/NMPGXqp2sKXwGF0RPQG/mEMLa4GuOqHckr6lL7d5ff51ojb1s

GvCRZuL97E0laAE4tXogM2Ve4ILkNpNZFQUpFEomIgGkcyfiKeqhNVKHPWI0w0Lzn2u9Saq4e9NX89W9

345q3azDhjnLf9PBd5GKJRPMrCLH61OjoEFIZYJV0c
9d9M4GqodVuAjmWfUx4NLL8g8g0U5jUNpxHdtXgpyLDTO1G1znF5TBLvYWWlSTwGCBln6V/Vd3GrmmWW

RZjsG8Cgtid+tsgTrRfEcm5yebVaZAZWmtC7um3Jfmr54HoxsRD5RyoiEJgvECjLx2NEvz3PgDRNyvr1

ayDiV4U6qSsS/cl+jQohFj6J14UhW2b58EjN52bCqO
huT94qe3+3ROlzLkHETM2w7HACnUWuDuC1G/lRyenySnJg9CpNcveWb8g9UB9QNXBRLrKk8V3Ts0e6Ad

OxGAf+9jFjpr+45smvLLd3v2Fmwp81CuwTKr0riCn3XbYVRFF5T64P0Fqdeck7sbuh3TSmVR4b9TuNYv

8m6Gm/TQ8v3pZMkyksSGkregf9doDxuGqP+c/NLolN
mpxiJ3k04QSc9nb8IZNa/Dm20sY3GDwO5tN5MAePfBVZzWkzObyStiekM6l6GolkxM0t4m59n3EODWlc

Kathie/lDts05bdDrzhVHLcoQdvgpmDepzC5qmN25Og9x+CYuQtYlirAyhN4Qdg+eGyuusfwIVT5fZOBx5

f7sNd84QKVckLSCEKu+zqqP19D/UbbwBZhT3X8Xx7i
Hllj8B+vIMOzZnEF/9sPWKnC3uouQ8cy2jQAqYHhY8X8l53FlDBwuWUsFvrEuUUY3s5fhxJqRthRsPTY

YbshcuMcoDdM4at0Rs3XBAE6C0mQk412MCd87USzLJ9NyvqaFT56xlfCQkcOMBrQd/cKMuzZuxVkuFb6

HRJNwdYPiu6tkplG2P/KfYZROaegJaXkPkOOUQUZrt
0AOpKejdjNEPvEtgDxnss5VsJkKwuI7y0sr2NX1I9uz2YEu+gCIhG2DzzBz9g50x7NUdWYIvt3WBSoGW

ZKDnXKZ1DdrDYcDvTXyhMh4J3J8Emp1v7eIKu9V/bdgdXwuqrR38A+v3MFwt0RVUIFt/WaemF3g8IVAl

3j+axVCU5kqVVPfHvgHZM7cu5Vxd+mBKxd5BHGvRN4
pZQliPsM/e4zZO/JAqNjDJy7cgf7pTRT/oMpCGMNCPcWDzq7EQU7w1ETbk7okn3eXPitQwuPRHBB4R6+

B518xsj8w+zmCWStU0eInyr8nGyMuJPKcTnY+ohrO7TJLLi8OzekD6CMM+cyu2CgHpYeTHbLWUMdr+wz

iFav5OKP2HZmdPPC023CEIq+uKI4W0HHoCBCuoiz+C
TD7THtxDrLjIj1vyxydlk5Ppe50p6vgQK6dL4KVBAe5/9ILr0liizVJ1lcIKKJlXmGtwtiPWq4iyZTCb

Sy3OA/mTzqTluqpGZQQQCUUGwayTK1KJLjS2ac2Nlu/hhHn3Nf54l0gQGLP3DYSHCw+4t8uYFYRzs5G4

ljyS3qAGc5sV14uI8fJq4YqXlG4EdI0D9nqJ/kXO0G
JW93MXy9jtImWzZteDgNYwrRzjXOKC+M9Q2S0x5BxR2EOBiHMsjGmYTxBMv+fU6ZvXtBCJVZqFkkxmxc

OO6hf2OepXS6v82pgopQtadmwrE1oxWjpeplNZBkqmeEriRfyDPwClbcc5PacdZ+gw/eZ/DB4O5+sly

/OAETP83WlKpuFAFm2N6mLNEUliKCXUBHi070LDHD9
iP/aOm10lr+Fz8+OrpMHg5R2nw2FRHxFMv1jvL4kXr1QQtwJfwvr8IJ68U2rNmYEOLpeMC+iHtwNd3Bt

3P6bIGme4jnb/up2Yy3ykNZtma2ceHUf9sCaINkYnEXH2hkbyQg6//fS9+nM3Ov+/FD5+BG9ZgV8He82

GV14T9Dn1WyOSvxUmfpJNeHyhXN1n86aZbv7+iLQ90
clzbvtFBpxaRXIjjq79enZJXR9vXoZwTP30636ica+AfNemMv0xn9Ut8Kn869u5VofoAcelMTGttTlOS

9ma9cTHoTU+dodfFjxdtonJSVu+Zp6OZYLGB3KSQNIluUnlpk1pxv0673j8c/OKJV97Z0lUOPHx4gQGE

BLz5fgVy1yrD4eEUd6PikdIZy5o+nPJeJGMUMkbllP
zfFEsW5qCxbJmyS2Ty7FCrB5hTCW6ZveGnU7olQZeHMC2Zu4MLX/VaASbOvZAybi2IJxxQhKEs/PjMnH

FGcvGE9xNH62aTcijWKdtc3d07MjKxQGsRvadOr09dyaWoUd1x6k2HrwwmNoW0wqktugu2L9xFqAhQbu

Hk3ccK/wxtr4Pb9Ipa+vrw7u/F3F+UFz9+V0ibrxA9
8eP35n+idlAd/M4nhMF2lxIk93jDyzBrm0muG6wZPd5RUK163wEmFzlcR6xgHpFb0voGirdWD09/X0qU

kpy4HxDgx+Lm4byottZrmCel8VR3+ZPEbflIQG43XaK/jH5X2jYvmUn21wHP3RkpY8e+RInrVpy63SzW

q8bBB537j+Fz4b22dHXk2og4If4+vM/IdXadoWdri3
vLPz2M205mkwG4yZ4+lAVRFz/KAVZd/OwHE2Dns0or4qQMruETTd/KqVZd/WcmiPUjxpHC2CTEU87cBE

sgC5weT4OVRwwvN/ZiCYeLV1LGTyHXEzmWEy9+19XFxzUFhjXyjHBi6ZUQFFKsNmwAYjV2KwmPPNH94E

24+OPk3GenQaxa8hOBI9AFLELSt3z60yOGSslUu80D
yHKD/0M0UJqUnX4SkeVb/SnYRIWoSfiCRZk5bSOWOJTpnJOqF5c4f0Cf/Li1++OCmqAuaEgpIWgKWoI2

dLzg0PasYdrfsmdFBl2dQibMBmK4jicecr4+CI9i4mrwrRxIPu1h1le9P3CltCmBNRmAqFrOPaFRLKQE

NSW4afUGQY1ytFJBF6u2zBWZRuQs1zBBR1x6qIUTzv
6q3WwjNin4qLBcY8jUs2+KCMJoUserk68WvtAlwgLDANk1ynCJQD7m8OQ2+GSusUl6nkrJzI8+vGgIIt

h9g54IyG+3BpFliiwMulYXP+8PiCU7UXsP6xFQ3d4wuNSBArq/YeRdQlB267dX3yK87siC8PV+WTJG3G

eoix/XSgPIt5WfUpOcU+HiiT371+ZNt7dlKYH1dxX7
7LA7Au49zZP7yRinCxjCTjT6wT3BHQ9+sGsc39922vBYsFTayD7bk5GCM3SgSpg3OKjibiriyPCu0v5N

4XsgV0Pz7cn/A5RPN0cDRRy40K2zvP99LJo2jb1/5mSRhzfRpqSc9a2dCS3YXUVDrtqsYzZ/jhX5Bx61

SAC785bySItLR4Jyc5dQBqA/fYRu6JZbM0C5mB/7sB
ZUgR/zQ3hrjbPssXI32PjmnygRlyrQ/LofT3e5mEkQ8LvDrf3P/kIW1FZ88jVuxopyO99Po/zWn6If2T

PRT7/By80LoVnfS+8OHoZegR+N5AfpD/qZu/MsT23cLV/b+zXUc4EVjEy6fQhX1mu/QeBHI/1BXPMYt/

hP/RgnkWzIl3K/MY2YtpJrd+68YkLtZ4zaqLxnd58h
7joDAj+ikcLnJpKQf6FrIf3A43tZa1RGQQAJe2uDGA6vg11TBBymDjMH0yqrLQvVfXf2eqgB2dLKNIOr

nxB8das0FMS7iB2uQes9ZSGNGBWx4e1bPDH25wIIftUrxE2SwtUfu4F+Hq38XtAxUr65kfX26fO9Qvh+

vAYUNj1yHWLyAetU4c2G+XWyFFIrrhvXMVJw06aFd5
FfSxYCSU1f/SD/ue/CJcfELkrnMsAsEX6pdWCn3GyWiXtu5HhzU3rYx3w5cfOek9AibvsIMpJQc9xIP2

lO4MXdVXLbGjNuiXxnmitdeucHVIq1iYRC4esJhTPxIRg8f3ixtgN0VraZdOmu+i4CP+7bMVmEogI/7r

h1i5B7FsFr3c4y/fHGf6OW3FsNUxXWphidKmp5P+26
I8Y56drs81eQCPk0zs8u/KDyG+s++h8hD61hTxt+UO8QFiltj7aU7D++ZGXMWt5pbU0xT4w0TD0A2LU0

buXODB0iX+yBpmA7LWWprccGaX/V9BenwvQ97vEa21vGpT5njlI2j/SfDoEAaR8vM1m+9o2fyPJ6mG1D

2DNvkxRH5LnUNyX/nC6q0BL9W4HKns39iuxdPU9Dg9
B3COQtS4gM00+R4Qr8+I9zQLBBgH1W9yQ1CDhIvT+tfZTfixJKL2X/3m7brisiQPel9nGWgL9sljXeEN

Dyg/AOjQ50z0L0gMNC/IbVL2UOcKhOQ0Wq1bILwuo2mEuM5Zy18Om1Z3mh6RDhy0+yKqW3n2gODR51qv

J4e8kNXjHU2URNjH9LDOtA8osnqMBDM5LepO3yaHZ1
t9YV3l84KZHy7j8XL7ifz2qv+BQ4CxTa5ElxbI381GdDYgOV3MVhczWaam+//Mo82lLo2p+9az/27i0y

jSXCLCnodD3tx/Wb/6Cf9+yTHJ4iW/RXfJ29U4C94O98QPWn/VovaK7WozJJUg4ommO4Kd5Xxnp5tW58

mS/a4h1yi9IN4X7TYkQw07m+JP5YD5T80gsuEGas+U
R8s7PXdb/BgoONws2MTXF08ct3GVsqSwn+SH6Q/qDxg+PSVGXdZ5nAb+BWWOrrju04VnLRF83v3W470n

0ZlufLiFjOxVGMEUzuwqhEGs2335uIDQONp/59sfTdigZZPme3ABIFyoxOW03oxfEQbTmoXSFAJa+hBE

RX8jxnOMnJiI1U6xGyQsUpuSWYZKUP8yRERozluKn3
JnDq5Tv0drdEHfB/W2ilObK18LmGzv5FT2yjSTfv1vvr1ZSHkxBnAn3UjeWncqH1ZupRGyZM0LS9FYBz

TNZOZyuFrM0rY+Yv98IyVAKgN3U34CO+OjYEq9yxZQapKHtjZU1Pio674jg4ca89xd7laoc4laVXYrkf

wGAuxGUmtXQJlxc3roABrCD+8h6waJ509njiM32FnA
GGIqKrDAh3vy4PEK6JBuiJjalPuNiCjeTQDYNWlVroqxvHv/EzYqMvOxKs+mUD+EMgBJTAKYFTgnO+0R

dQgnO+DA8CfLFkT8OZqnDhvjiv2sGGZTgYVK4cqAtJZ9XKHJk4NDJTQ0XGzMrczj72UfDDJ4ovlI8J0A

XAM7/ncGXy391TpYFcXbMrOxVsUSFg5ymc3IiJRXUH
Z0CuCTgjBqY86hQ9GtBhZuaPuiaJTIvAC3Hm440IxkhDvqxPWK5hAifkBkWYrSDBDmHxulJqQ4UGL3AN

xs1DQJrUdPoauti4dJJKQjJWfzvCnW/gnu5pN8Q8SLDMFJlWhXgGom09n0SKMuwVSl+o0UdwaQHnVNju

bVAfTORWULLBKd6ia0LVJatKABxNWIfqqoKO9lpSxC
yvEByfKueRsja9HbqMFnS4iU3MpXTreubZFVIkHrpn4BOi2gilvFyNpalcKecI6ruPin3mOTwCL+AMs6

jPbEZZNDhfyTrMA+scUcgIOUwUVMA94amNLEGingdq5XH3ZrUeUsgjUkrvY+jF5LdpbMJ8yBMNXgmCW2

YVkcdfwI5zuBZfuy+lcT+Zqfo2MYCfnfChNdXHWcPY
4rF2DpWF0lyzGhmlU/YXl3uGFJjP5hYg+uYdQ/PnuXyYqiEmG6WfPy7KJQ1zZ3RkF2GuGztdhgucIOuP

bRZyGItNDvfSZYNkKWhRvMx1R8QG0QdkYIFSkU7BKSVIRQMJIWL+y8KGdaOD3Vn2wNBjvJKIAWWMCmKF

FzetQwgRifqyGXHRTbSFGXCIxvrMsKuOFDeT3fLZR9
jxHMrJc7PxXUxFihH1DcTzHWiMs34n7BnFriA5JxYtFXoFx5QfOBkYnrjqrNE1YPiFmDWpSywVtI1I0Y

sU92YAEWp9JQCzhVZTxOxOH1hINoa8NXXIRlxxhHSZJLzphiT+EK0OiZYbvZYyBl1SaMLFHaLF4tVTur

YQhLrAKUHgGYrJ8CyUVGNdAIQDmGZgQFwJnXEV5bXQ
j/5g3kxch7jQ85tZILbBlhmYt5ErJHJBkEL6RXYkabZbfAwlbE3diMgNAUvAtUNTKOSMLprqRsjZJtAQ

XdC7vSNm5K3dGiW8NYV00WouCo/jocpL5O1is8ugu8pN6YNwnLrTS5cMGY7qLoh4nQgthJkdJlIp3nmx

rx72M6fERCHnY480mDtBhkNR06gSNfo9Nxz0rOHIV0
2f5Wr2LaYxRaOqCXmyjNeZSSjRDAsZKcUI2KYA+Te/bu6wVAO8LJwtSz6RKLmJnO8oEJHauzC0KnT+2

FN37aAuyAQ5VnGMgNGYg8QSDnFshVYU4h/mmDCCH4D/ZgrsAj6uouO/XNIZGTUaK1VoI5yBQrRZYLTY/

pOZgAAmuChmECwvwqqJs7jN9/yo/r2ytlJPd6xN+BH
D/xUOYI5WHcbqYmgm8aARf3hk+H7bLCfF73hluUwJc6T+mF3U3NIsvE+2nHVAB/vCF3t5yAnDcxc9EnV

GbpMmCHqqCewhwGOmrEprdSwbIqbjNCxPKnTCKtmkKBUndmKKOR3cTJyaTraC/1ox1G7/mcEG9YCsLNH

jXSpuRQLD3UGrz+NGBGYZ65fYR6WfAwymuYgfrWJbU
mjtrBf8yTeigsggSuJU429BK1jhix+o4aTRLFVjDMqfvpfH+ADFrfMFxNaIr0Ql1R48QTD209DWu3DMZ

Enel7VfRky/znsjoHRZYIrZRKnf6wTdL/TNObFS5JyzyjuyPGL28UTqJBhLCgfFOkzVhn1v/SeOoTgmh

mXSHiG1UQmdfe18Gl/etKPnvSjJ/1zItPz54RllHfG
V7WRdYIHjJx5KKW8XnOzWfbJFmmZudwpSN217KKudvcPqDj7YR2UzolWEUDc5LA1xUJL22PlCAfsQM7W

NWXUopNPLlFuH6qAOPcRaTeBACRWHMWUahE2WH0KHdkrv3503WEEjLzoX/AcqrrtBbDbDVXLtKrbXqAE

szSezf3ltXe2VCWT8G9z4oQVOvSTwUZIbzCDeQnnFB
MDOPuFCuCSxWAVQRW0vMUAytmorfj+DGpJHiEMMD0oL/vnWTSkw0Y738erwEJjYj14B32J2hYEN1hGJq

BsDVaaTUowhUAJBu/UJmyZj244VzSHnSI6JJbr3fWin9tSsPDEQnasfisNdCYqTCGPdOQaBpMmkxMEa2

XyTBkIHofOlJrmYj5Sts4E0C2iSBXIO5oKUcmse+Ac
CGvwIMeAHMZYkjTRGTcUFmXdKLqW9IGH6vFrB1G629T8l8cv6e2D6xRCcTZjzAqRgcYPCELWdPYhysKS

rldWt/0C+NfA7eVTCoJx42rc64Aw369Je++Jf76rf//vX+9bcsh//vW+K5jYBplVbA+FBQOd2EMYe3ZY

p2U31L8Sr/iRwto5nwowzxx0bxv4ylfmzvm8zgn3bv
b8T68v8ym7cMuj2+kndl8rtnu0r33F4OO3RXBF2atZEFcxPNqspfGkkquj9hba619vc1ng4jgg322dm4

up8jrp05ze4PvXgRC5IK5n/e8zNT/WQdTUPGROzUtGk/vYoaIFbgRyexbX1IuGhuqMunXK4G8Mf8bf4v

zs18hxu6oz6dpl43xlv0ai2utWiIbeMuimxm8rgQr6
c/Ik9w2Na0r6s1kn6cwwhEHda+DCeP7yMdK6kT91pq0fekwlsb92dz6pptukkz03ta9prsdsiBUNWpUT

9PzSOTVf+8rnyzEt3Gf6VadW1t/4Ru5RwFVqTU7xZ1IQ4RhHo8qv56kszS30i1ToWrAscU21m4ArAbKk

yB09H92t6pz8mMyz8+cuxq8qhqu6p65w3YE5SXZQ2b
cCMUjjPBwqeuJrmgpZl3mXyWj41oe1z5pGdEt90mu4h0jMdSc0BrD44eSbYW/Q80v/+18nHsI/XuIbci

ca6LuwDvZcqIX2ZZbcZxUpWF00QiPR5GQRcQPW7HE5II5cD6jib+85U+kCN1nTXB1Se396LIHdtXVB4h

2Mw/es2J+4hy+rz2w5brjGksj8rwmida4QtVR0EERf
O347Vc4aBGDF1t2ZNePKiFZixIHGnKQdTL507Qa+wjYW80499Jt8/wv9vDC+R1rJSL8dBu7Gi7afpCse

arN1Hq5hrk586KSO6Fcwd/Z3ylkH5x/fs+9zAhm+NOyVUPz/8L11XV/2cP9oDTnorzUit9T3QdPMbi3x

c/ruCJkLpkOt9AD16D068xFOoz547YEn0Pegs8373m
6o0W10f1q0DsUOOmD0d9NZb0shKrMkfHdL+VTCDjN5jHkqYz2kQr08mw+f1U5hq6IFZjf8Us5bA/RuOv

GI1Tsn7ivWQ9/qzwERMVEv7VreMpo15TxpN39xe+9TOus78aulgk13hu0THCie5s7r+w59h6su9JvP5f

jy1Tot+/QwHXfvnWBb5ziOMD17NqdQrKm36GAH94Ot
TvTz0Vf31OeXr9XJtRO04ZoF2kEHrj/lh5uFY7/V/z71LtZwJA/D+KbW6kFiDRrWyQKaAn6C1+irSyto

PO/Q6w98kufY0nGI2zIlB5+XtoM34L/41kRHFT8KDQ7b81fhnu0nDUW+hh6r3JXtVG19T/CG3SJ1kAwZ

0c8SGN+U9549jEVg8G6vo2v/8nhYV5SXswsm/V0YT3
/3R/mTSbc/OVJfWf5/sxpZP6Cs8tCqta9mdWMQr1/1c7WFmfoT0Mgw/bwD+idTOmXzXUAQ6m3wxAGA+o

Du9+iMNXwqIGcGJjLB6Fax2IJIk8N7eAsQXAv9KxcWR6oVNcgXC/a5PaJJO36yO03S5S30QjbjRaoy45

Ykku3ihbYfMucXS1pBT3xYV+j+Lgxr+3nYxDXWshnm
VdpXlv8/fGfSu+m0r/KahXkF+2osPG/aV3UN+CN7Ze2Sl0iMQy+qhx8k7BcthM+yfR+4/vCd+D5SbEF0

0RB2w002EdZ9+2VfJQ2+qa+VKw2Q7kQgoVKQzctwejn1oiXYdLS9+G8koZ5y6tuiqbWcswgYb+26mfrq

ZSBGJj2Q67T00xPXP5/3SQj84FX+S0L6nqqZH8FLsQ
gsB8WWpAt9JxGDfLLFhWF+2u/qSZUaeRtiq8XCKY5X1gnS45E/GSUHosFtIFuqrMu9BoNFhZYujP4a3S

/T1R7YgU/ofo/oZSRZ5NRiuSmpB7knS05evx+y7YhZoBtTK/pqGvga+yr4ftwJ06dWdyU+A8LZ04C/L/

Yw2OOp3Zsg07Dk7xOoskwx6ZLkwJ8c1msaT5kigW4l
gK+Zt2YjcW+Ab+B5A/4igS9Yf3A6NoorfKM/cKa+OvVOZuqrU/boqz9bCoRP2FI2ey+a/29/Che+Shl2

68m52/478Rd87Ixbj1+D0ztHH1dS0b5dAjh8P1mM94Rj5Rlu5XoDPT9+2ikGkA766QdGi0NNvFG+Zdrz

3mIRmvadD4l+iukEbbh+oU3/junZYvdxKoacHXaXsy
H6Dsx0FpIIP6/94yfm//SdEpbl0L6bxC0+2JbiJOrx0F7/3ofT/mRCklYlkf422eeAj0OcfsaWA/esxG

0np32rtnY+TvIWWo4e1Fh93pBzvALdYhB6QLIIN5C6JDyhPkJtkFG5+hn+xHHjSBOvU05VP2L8Pk1j1U

9vzeswZlp5U8iLdYNW5YNojU2oHuUmv1hLgnDJHnD4
g2fH/Xor3BbrE0fc7szArK2xtiTXiyD+8Act2i+kjYT1e0TOvIfk/o02JvtDbnaqfvAgesy7BKor/g7a

7u/amz8YYvjS73ybMEF5lF49dv6fB04iJWigb8ChvefgZb/XrdRXRQ/MJ9k8k6CVr6+HgI2cZAT/tmOX

IlZuX1wDd7/kjybL1iypLy8Bf38v8Ct5WMH6+7vGA1
iE64oh2CoJjR47iO2CEeB85RE2aLzKinlcim6+grcd067m1IbiOqrWdl2uuH57CY9aeDbvVu5HceP+Nv

C1tp+Xga/thV36VcVX+4fW7eK1MUu/sNK+Ot/pnkME9VmFi/3IVlwszo7nxN1O5Mva7DkNue7+Dr4+cY

21DPAHF/aG1QAokg+r1yQunujks8Rnrpq/93rsivZD
V+B5A+8v/TMHpV2Z/iugmEgcfpmTXdG6qsNK52K/uGBfLdhX/vT76/AH/VHQA/XO0Oo6QJgGWNsj4KMg

mk1u3qU5KiIplSM33Lo42ABttIh7mnFFF+yNvnF48SAogD3H4zq9zcr6yOaX1YnLw3CwOK2JkmD3T+tn

t1fsoW451RAPP/wgkKG39thn8Rn9tsRl75gwD+iez2
5RpjNnzHYtRXeQOEjaps0g+soX+R4gGpK9ySdI9vIsYo7Ms7EzW7m2xOv7Lv4aa8eE3Cv67BLckBYnVt

TGNzC+LE0mdU055d3p+rws2qODM/TA+AbGF/rKoa8c+djbx4n2seDrqqBxC87YL96T0NL1/ALf6S+8/k

gcfZW+Xon9hIyECgA0w4H+Qk4GgO7F/H/nx1pam2U2
zCWG6GkJtHEfGxY+PSeZUKNl2AA2Fmk0E8HoqXRjIspdLXNuSUY4EeQn+on/s89x6C12BJq5GWu/ioHn

AnU01f0IdXSN7pfl6JzMl58kGsLIcyr2iJK6XzcnDv++5CmmEq++WpeRDA307xyNPk2+qB76+KHZztFX

A0jm2XDWsA7sj6Toa0aPoio8t84LTwgGxX+orY9vA1
R7Ew6nPA9Z3pzJ4r1FW0WptjK7jXohu5Sn/m6H1Sm3TAwk2L7VTmY6Ddw882+B/mKj9miLV2FqysNuu6

Am+Hrg/1ui0J7kJJrJwlWYlaADedA52k+ShYdYXxj2JvV+RPErDXhIKR9nld16x1WOVeQfkckR9MvAkj

foAR5Mu97CrEXl3o8YEE0nNJdjWRfHkHr14Y054q03
jquDme6LQHu3cW92qqU1kU43zhZZZ4jn/YWtD+j+VeeDQov86ep5UbbO5kg49jyTbqmcO9y4lYhl8P4Y

q5MGQZ4OW6GyZ+iW0uObQ9Y++9S80wvkZ4BE9+e5rK1j0DM/12ugTUE9sj9/t+D9xtdNd9Y95cB1+4a+

2lhv34bxNYyvBehen9zrAS2gFTT4LvBd4LvAd/X3aC
/M59Xfo+4b140wja81ey1+HfMK+4Mb/eG9lw012NtJ+Fifi+EycYytxPmF0PbbZKgIgxmBXS0mmiTZgG6

SABl/cR752zgk+4IY/lNjTYkNz70rXd4Daaiq9diO3goa7UTbNj38BkgDFVb/dRUK6NQf6lSFcCISHm0

XnfYPXZjzzBPteMjBSwRgNmSwuozWz5kI2pG27UZOU
gBIoJWA06cNw0mewD4YSDAJKMCjBoASDEgxKMDaeZ7IPJufBpATYS8xzLG6w3zONYBE47wkCRFw+CIGQ

N6aB1yQOSG0pMXXHbVBbFOw1nrc7UHDyZuVbQlSVU0nqHdwUHTAVX57uA0SoFTe5qNe2+yOw4GUJ6XK3

e3mwcC+QF7Z1hYyisTcndlY2S+Vh+OnjiC9+AgHGTw
zV5bW6FNHIDZ3D+fRVT3JBfb1i2PzlXIQKObJx1przD2oa93TIB/gKtqj4FB1CVIbkPFXRWMprVEOeDF

cvY7rJwp7lMdwCf4PDPhrk7Fcu9MLgM+2LgH6V1sNjRKC7Vxtd7mtmiCyRiS7XJtthVCjoyz3T0dzdnM

WL/FAG0ZvGd7+QBxeQmRHqQUMVrrJlbm6ouM2DZkmx
8DmnUQBwCIeucWmtpQyPMfDyiPbK1n26szc0bIiGObL8gqlWGdjH+HFJpP5Kb5Mi7xr5Lqvkc9B4zlP2

GXgrGXU/qoHUiU/dwX8fxaz4g7RGnM+n24Xw0MwZCABxUTVYOHsMVB+hHzebMEUTn6yMk3Gw9QffAbEy

zu1MAWkkRhBxTnPQvvZK4LkS8UCV+K+SAflfwHngQc
A+CPZBsA+CfQA/MzBsR0YBYKNa36ZGNJ3/QvZJSC8B5U4rnDBkKflhau9G+N2ysyO3jZNcs2YCJgv8pW

K6BxKWNcKHQAYI948Y2EWP839VLQVeUXT7SDfVY7a/ZniJQygHSPkpnmQPBb3MkGxEWprfSYbgNNhCEM

IkZpQ7R9MD8okStX5BPj3FEvgcqxJO6M4SoPXeLJxq
xw/QjAvny9aDnxfbyCvoLJv9TOzZZcyPG+kGxfNqCZnvhJ65n1A1fOYBgsbsE6TOA9C0U24a5cnBwWXD

okzM7f4PO8HAAhpJ2QXfrxk2A8G2gd86eFWSPnT4PGkWGquNp9Ndo0WrJVSpeQ4L4CRMyrnlaNnZjhl2

Y5lB7GAa0zWENYomdh5UTVlQsCPzSsSRpMTAAqWN1z
Ruthie/U1OVBUXEu1XQL1oNZKXNQU8SYMcP8hZBdnP6XsOPan5PTeUSAKKBrdqRIDwoIbFGeRWrhGc4PlmVr

KpPMWLXZFUMlWCOLp8bVZMLH8GAOGPBOGbUQxSUYq365WkKieMClR8krNH8OtKoarLiVZ8LZMpB7RjYY

xKQN+82mYNDMrGY9WKxRWuU1tqEpJaBpydYMMYUBJT
pChLJim6P/cTe02osWtDmfAGL8OVm67Ipzcf0DcPwPXqUTJdW4NNLRJKVZG19DO6uYHJRwaBl0L+82Cy

1oH07kWyKXdnsKFeKP3RjrJDImHAjgQyDUfgqhw3wiQ9bBEKXkKMLQCsxTsgsLGxIIFgFYi+8gsumLzM

2XNYppmO1CJTRxn/IFlqCP2k4kfO0C3UvAOLuYyiXm
/adhvuzZYasTsS0RTuMMfEtiv5qhiADOlY1t5n3+TZtoiwL4MvMffzEs/nsf3zW2pdDnFk4bqqVcp37a

Q+rsZz7AUuP8+ItTH9TNKGrqKMSLsZS56h7OGSwGdON6eVVOLtzJE5xQtoe3G1OzbJygmg64XjNlq8ff

47OlFzUmQ+UrYBPhJd6BkuRBiRA2y1yb0LcKBIXZsd
EA4gATXxkAkN+BJkbreFlKt7wEjqH3xV/dM6lpiVA7nWu7MDT0rYhCQeuOE2OnJNOlcVDygVf0CkFIic

3IRCZSb5ZeDXN7S+44G0m8qI/0uhRKz0ICEyDIpLSZhvZVUxV7+fPLElD38oGn334xfo0G5htpchfYQd

UNqLLBPyHTFSQHjZPvPkGMhEVwTHPjXVWhgBYwqI7Y
24BXreefxXI2HndJ0y2OeGApWYMU2CYSKYZWMbOfUycrA7RcdoevUxIcGMm7sxR5fQznHYIRDdCObROl

U4J8FxmPuLrodEQa6rvgPhM31ZyLONeH7nYcoABGFfWqFEqO1x3Fr1JOE7Qwn7ZFdsZmvbrXPBENiFA3

FGEMBCMSSXSmU/VOW8NJtJFJWgODUHdFTF5C349oBN
98zMxpdewHS3chLAQQlQtPeDU7rpzzurwajShEUSveLTZXA5iH53WEcV6h2erpXkmSQX67Jto3R0Nhqm

ZnWuGSIOUIENRAE4HMc4XAiiV186xYG+iGr4UKvQfJcPK+QFDZUINPU1L7HklEDoiz3mvZneFDd8OeCR

R9qPF2mhpwBVHqhCEhcJafffcPao9nnsiLL9QsvxLV
czt3ZQ5u4nAZ3WFiMzNLG2Nmhwb4xidACaZmt8xVz6JCp6EZRRy6ViSG8yzdrHP2kKWPrHDGDz1OqPoW

SDfp0GyeXbEniIUlllvdZs4Mrgu8RS535CfdCg27e422jqN1q5tO3BN0FRl712xItW2lB0lxP4iHpZyA

NESLMhtSSXwGBdrb8Au51DToxIlhcXfOBDHsvR3OoY
VrXKVp5icJNUZ5ksrE3yft4ylJnCFCQ67qwUXZHJ8Fpu+w8Fe3G4PSOFTzQEN3VEy4JYcgAcXBDLrfGd

RGLlLWuFJ9pOtT7KIMCseS7gUa2VZIEOfcqjUswJbVyxkxQraLW6UBHFZHwLDMC125SVaERkzSeK0+ZM

uQ11mSiw2sdHdhFSGuL4yk956ZYbC916WChQrCcLqq
uRbpoEo80keqVNqnVpJ2lppbquWfUDYP8ttTkNCP5y6iickAQT3DwYgwKkQzymdp9/ikyE1UsVZ/LREx

FcTFW/YvgQKBpQVazAeEC3H3sJM2mklRmY3pnTxP4ptZhaElyoTO9q5ULkUToSnC7tDyvWuiWQyITgun

MO/JYWuCJLdqMaYZa4krRTwReOfxzk69eGhLFrSsjQ
blRVxklZE8omOUZE8rlFTZzGmiANskRTdXfDXF0kUPMlAVs3m+I8+043fmzlQ8Vm/3x8IDgAAgow7zv3

7pS3shA7d+DQoaf6qnb7Vjqkaq+n7Hv4WNgAvvHxLRDME1G5coASIvMmBNKVZVAuLGpYrx8KuA6jWCvs

Ez9wQVVNvMxOcHCMNpgmBZanRXsZIVSAz8ElBiNEXO
P2cgM6kDbAvdZiwM6HHqW3u3OiHFWxxWL7xDu5cNmA2nAtYUxARYhRGOLI3GfE6lmXEroLJqx7YJ/jTQ

xJCXjSJVNDJLjHcpNDCsBfuOkh+Qo5ZQX8FMBFWlFFNyxqPiPIVGH4EpqYNQ37Cvw5dJ8UcNByKfANNY

behUB6m8TkPJicB9xjqZSRCygBknKFWSMSSMuVoJ0Y
uNPB5S1HlxyPqOtSSur9e+YrR7PJMTqSNs61QlmeyG/qUxy913IvOQOsp41w1WqG+j3Q0UrOjcAQAMiF

OEeB+78Eokc1QNSCYrKfR0U3RptInuzefOUiEBF641QgAHOou39dbV5ffT6yfBGM+13OTQRrJmuL3ms1

JzUoXDByVZc4C3F2Yx3MDAjq12ex2H1KgTVe0JtjZf
ZOKsVsTWD7777pwSxreoOR4avsWB1HuhEXj99SCjcHXu44y+56i9ekMA4QCpH5H/cuG2C5WeAdOSfbH/

KSawYjHEsHjpMZzHjRAy36RymYj4A8EfYiGMtY2IqA9NwH6HqO2HSn40VRW7hdcv17MjJ1ThXCmQTlpm

jkQ9KXwBBSDlLeobsnQ6VPMrBWRRQHztbrqVr82fxn
qSeqxrlHi5itsg8NsUi6Wnl7nmTOK0Hz9Op2VvaBXRSAuDXDppQJkpoEdlF5kEhIEUhSOjpHLtJrhCj5

9sT3JkHAUEZgv6AWYZHZCDPPADZrmpHMmebrFItHvP/WPKXf5i+MO6z7pe3/9Oq+Eu0NGn6/tDYJup4w

FpCaOZQY3larnJZanswS3EX9qw48JOsft+UCIVACSo
ZoTLDgKPdp46ksipN+M4+kQkj59Gzra71u1W/Hy3cb5MKKOT5TvDotKkhcruoaE/fwcsksvpYNV5Yev2

sf5KOTUC97ajfxPRgbsn2YBw+AkU57MUYhPrGM4uCEbbicc7uxo2FCw32Jt6jOaYeCXXtU9OvmCtgP9E

yVli5pTjlyFct67xxkTub5NNo0xQOWY/Do4cxj+QJF
IocUnYYOswM8iHtHmrN+SDHWBzHb+zOO3qnmjRpTgmwX0iPTQnn2MLYeV4Wk15BlsY/NKQaHtL0hjgSX

x/FD3TXgIS91uBKPCrwt4GzudABSsC44bXnSvh45pdth7gSBDys+sO+snviL4crjDOgrY+tShO56CO+o

D+TSdzZQVvoVSbUQaSET6ZFM3tPDoB+sglgcrTyWOq
ddH6iMQodXYPFvp4MGv2KbqJzKGTrrCJlP5bAFTJekwQUzPhk819YX8i6BM9mmRcdPPxK+swoOMVbBKc

dGDCTIsKndM3Jq6oAbKDDxyargqMCivZAHvqBw+FdhitYLCQIJxPOCHX9AGyXxKvDKUHvKx6BdCIIcEB

evQBLYuaLixPYuvYVimRerHoHHTKPnjkRB8UybdCrJ
MHehHzihwuL6zXoZllRrD7USYYGoBCSrK6mDYfSXjGmZDmM1EzMVJBXKaGIGlQTAsIcvYmwRKshxpIIz

IX2uYFPV27mf2RWqyROUEHDJCJWTKaR5FDP0MCq9VJVXZcsth7hpLd2TRj9iAjfuWxpHwM3A+W1y2LuV

PVu3IYmlYJPHL2/cZvReTIF3TLCyMmE6izwBU5KDrC
Anl1LG80steOjLzi+cFr9GWVcCsCnX+E5b7P9R8Uei59zTiyLDVh6QtLmPGdws+8H6TOmX2JxxfGg8GE

eeY0WD90ZIPckrqSATS7PuGeMEJPXRBvuhFcZZCXMrjTUpLxAWWVBebNpyzgVBEF+cekpVybdlc0CzdF

2VEqlvoBCQDmN4VuwPRP0VEbEOkOhnPARORnJ+s1Ye
XoJ4Da77v7XJXf3pILTzi+Yn9lq6RespP/Hma3Lee76SUOEzwywO0VYMzyyUvYNGUzAS3BOamSc5AFCx

ZGuUsGYxa0Ny3BeOSVeyt/WgQ1NBBdiLZCccHIMsG9AFOeqDXGmScvIiUal2wfpLSCQS7nJx+gXW+nhg

qGoiS7WJoE3l77gvCX9mje9duhrLY6mdq83gz/WDTx
5DtLsaqQGguG7vnbM6KVlYgfDAoRrlaAiSplBeDBZutLB1pOISDnLLs5Fj14s9EY3CZ5/I7lvSh0ZvQC

g7s+bQgnnERpJ404FhfATEGibpRrE89AzkdNQIq/UMHG+cyxaxGSYQHIuNShNAUDfG2U0fpArfxSQWow

08iMi972SeyRh4wCWhbdmZQixduvFbOY6Tau9FlIr2
P9oQT4n89VXJjm9KyJkNdPVGNgu8pOiPAkGbl8N7FgwyaPNkJAexFirZ1eEHu1CgMVnF0cREpSpzJl4h

wDKiauxNEgjnTWoy05yedaTPrif03CiOELomBvms6aQ+sw7sO+wErhWT6Cqci1jtGE9hKwP65HHnd75t

gowVWhpZ9UCo9vJQ/BOUxYoAQM4ZEi0BM6/lAiFQgk
EwCaCVJ/wsinDNaMbTUVf6mNTw24auo2RFsqnfmUUVTGImnbB1QbJDbWF+JnhekqMzyvCCOPnU6VgrB0

hmC0aag7yjINCPy4tbYN1vLrLw8nM7gr1xZ+eKwqBAlXhaEJgsYdm7y+WxzLrHCYKXUQKjBMY+MM5ExC

pbLIqvOdv721OOh7m7CAwhYM9EYzUDFp/jOmDk9QQG
OGZytViF6FQbYLJVXXjHRcRZwOgrTzrU2ZHFarBB8ok0+bB4snhuA9HeY7oLRDmh8DZHhtIolOIJqD1x

FKVZcFBbhC5sQBuiMUDDf5BpMJgZbpRPVIuc7GzUBkGVZ45jeYhTQ2tIGLZgBm45nvtKPMUUNfdaybNW

fiYKmAMpU0oLR+/VEJ+estalzCuVFcxYuKTB7JJEjI
NzkATsRWxnYTLVFMRVtDWFAMOWisQJXwMEdBXFuqGRYGgCDtCbZSLWrhUpTCtFjvTFP3Kqs75MjRRDU0

XYFd5iv88d4GwKYBr6GQyRdxFQFNhDDSTqUeCpWIbf7JNvTEQWSGdDg0ZgdTlYq2LLvTb0METlHviaYM

oIGO1o3NhDBNidsLmMei5fgm/JpDZZTwLNvlbSW4Jw
+80La2YcTkeqwQQRR/mQHMkLI2oWWr20x0Xpud+UWjuuDAKbM0124DalKw0T39YOIIgZMCgvok2YV1kG

ngnQ7LjiCJUAMUsZvLsUi7DY1X2aWKsVBc4o9wnLQHyHrc+///W//nQ13bkCqG31j//5+x//W/7n33/+

799//Sfrcn+8u2g63W+7b8hLtEt8Mfud6UmPgm00Pp
rhegcdoHdf/6nCEz3mKmUKFf70+M6kF/4Pvgq+mnwzA/7b67zJXqyv+J8EZnnMOs9OD8K6sGBhC5jvbf

jMakoJm3LElnCGanHPUl/hlyrLllI003bhb2QOdRNn6yWS69FDi32+xOO5g7Igun56x4u6LCi62+E7Ml

ka2U6V24M4uSB+d2uhMj26fxu9Wa/H8/tA7qwL1yVV
K+gBetac/yfrcl/5Hc/m3b0gAcxLz+b/90vL+f+b3l0Nsci0jCRey32bxb7/gf/Pc+7c30Avi01OdM8d

bxkiQO++Ch6u67I+bd9h9XyQX/NqG+jV47W9+3/pNne2583XOxxugn0KveBoYDMkpxCFvsP9H3qr/tih

Go1c1vzaRlg+lXuWQR1y+H+/J4an5PcHr+teiK58Jd
qQ+nih2O42WzL0qp4LKk9oggj7SL8Vn4/qdxzPfsixM//Jutwfs+xbl/tjXeS97/d20cWxlm1u9fouyH

ifdt070tZP6UiXD89+BoWn90i03p0FK64Ke1euc9YVkTA+56SOns+a+uouDYEO0Bjf+IYtgbJyy893/W

0f+sfm14cgbMa59ruQZlx+f+eqGvhafxfUWj+rDfwf
hH1WQ1WRSfkwo0ic+ir/n/dnURf9FgwvqdcB9Jd3s/Re3Tp6ixvW7CLd1Fyx4+fs2Lc56S02HOU7JouE

9isGxpV7XEE5+jnQzzFBG+gFGv2c+ipli/4uaOmrpFs/a+qr+v/64bPDlUP5sD503h249wTYebMvobVm

PAtc++fG6dSvV4Y97IwMwFuMLdum9lT7+I7UVzv/n/
o5/00X61zqkI4wwizdL0eQwf6PGz5O0I7m6b/JVW153RRCjIA3eGt8MyF+6tuR+npwSx4cD+kO1vLk4C

evHBkUfNO+GfeauU3ElM/3x+jv/by9uxfI4p28cPv+7g/kexM3HZhC35X02ijWznda15x7SGld7Gz28+

z3aKS+ynGZ/n8fX9GS6xrU9yIolf0maqX4jucpfXle
BmFw8VOv2o7GxpVnKy/96v+tC5P8rwoM+NZZdsMkJ37gw2X2fjb8zFo396nebr0SJFM7mgY+yvGCvhqO

uzI0Ozq+Aew2H7lMjUB9D9hnibihVDHTuMrZ8DvH+5wf5w4dRJTkjGodliBsNrmes7W5TU+Q1jQccy17

nmU+vxlED3EM8MI5u/dn2lc7/+9fmecTuKa/vzP1Vb
IYqVIr0dHVpou5NRiP/dJSnp1x/NQnkGd833E+FyyTI3ZSk+J5tb+DM/WV5/6WfIvncUuOxsGP9k+Adis

X9gzoO4hBXGm1q3DbqP/080M+wr+KL5Th9eUM/x07Qje39v+vR47mndj9JtzT3mfNkVZMba/MantfwvI

bntdaT0/V81EwyokbjsXmJseaikpa3/Q8X05g0EokN
+awfxLFdV9yW63E+ab50pExnTRuM5b4nC94+4HTwdTxv+LQTGf878if/UP+kFofc5Ay+4Mz/t52OA3Ur

rGtaP8/A+EaA1+E7z/Kii63FUzhlg37+hj84N/TGxvPuBRrju/Sb7W4G0mV4vf77crGmw+r/A/8/fGf+

03VqSST9o/5bun9c8g23l/7uW+rwJ45H2behg43a64
9fw/qWdL61iJ/UUl9lO+SHRa7tB06tBO+puO9ED5cD0exqc57s5i3Ul3Nf5eg56ulNt6qxqkl8J5D7tZ

vBlT9goIa9vc49V0iL6fV44eQZ8H+1y7mdB44kp/7GR30Qoa+qy/2f8Vde6syj9MvCWbkGKfnmp3ksY+

j+1xfr29OQS6ji0PyO+jABleI8voM0JwZYicOH9ssV
TiOpPreVtZjksOwMu7n7kxKQpre1yubW+dSjk8sGhqq5SpUWuowy4296zR794oWobZL4iLkW21txyvFM

V+PIeg8DBoxSKm7FdS8gqZ3M4DNqdIJTRt2S+jogpYe0B9I6qb0O+nz70gPIa1s1d3Q863D2M6A/j9bT

lr451d0bX9/ZaAO3Bw2Yb45v9B/W//2rk89y/Jfudc
KzGH+pFcOy24tB+ln2kB3HQKc1u9lJjphWh1xUc3ssl299e1X+kTXwOD106n9vEut2hbt7krCEgIlwzO

gZcX2QSJ/1uVlCad06eWdvyDd4UhDL+6quB9/UV9n+BN/UV/mMqa+pcnSj59EzCopFkpKKxtosvE74xC

3fZe3/Wty4o7cuu0ibQ3cXpS55zMQ3fZ81iqSj8hCK
Uup6Z7oy76zr1I00JPkHLUevkbd4ksnHo2v/Udxkzn69zQNbEo29lmt96VqV9kmM+AU2ieNNdmEVokit

NcK12NfpA7mPl1+x8P/DbtrSaAUsBr1p8GHlN3x88CjNO/24cRLdddZzY8zvdglJ8/3p5/Wnv0cO+8rL

uay77mmKj/4s/C2mxQEa95QlU7XaH2ds/g++1Z091d
+nt37vdq6w8r/rqa/zJa4hcXZ+cu1+ypIi2imJZFn6lWuBUdhvLy0IeABnnOvq4U7vE5R2gI/2Bn39wk

61ltn0kx1t+SVB3Qkv1Yb2HO2F3yk+2Wrfvm1XNA747SiWspEV4qumuPMubA917inh9k1sjBu1pm/BV9

l1CjmNM/oZ+srhD/pCP2N/4HmnjuUbyg7Fs2gH4n4D
w39qllrI4+6t4Ahgp2t2k5RX4crs6N3I89d99Xp/fvftxsW9jMZdYuRQXvbfwp8Ru/PymAuZ7Sv/kvz/

iNSLhqnb5i+e6+54n5r48EPjgTketag9j+PPlGrvI133KuNvVspoqz4raO9UIxT0+xupryTptjdCDP8/

fDX/79/2FKOuz0/xzV3rsOywBkzty85MZ/dAhv4sNX
AqzNB4A5Nlau4e+xz1w0927cbkgHw2OoYJjgBb8k8yt/6jlTViQtfDqB5ahV3i+CUsqTGe9gFuTN/0q6

8+Dsppcx4/kAVY7FmbvhfUp4pEOz7jJQhs39srWg+++nuAhMlxmGAFdXs6oyn9ANQucyxnz5Z/MKCvwg

au7/c3rPd/x6woZWZe0uCw4rZju7S9B5W+geUsf6F7
M1PbvZPpU9eS63P0xr2L3H+D46gDSR7bD9/oq4A/GA6+jvfI+6yZrJ9tfEma/uwGBlUo8qN0by1JVz7K

ud12O7Ao/d2C/6Ofd39/O4Ln3JaYTPDiisJ1ohl7+G7YV/sR0N3P++nn3U/tjxroSabx3jfl6S7xN4L/

rSf30/LmGh6XUmfPD1E0a0/OVSt10drVUVuG38TiRs
2+0t/frQ/utvf0WlzVwcYt9+/VzhgR2m/r6eT5ME/q4iC8w5/wXAN/wXHo9Nzdcs9brc+e/P/66sBzGt

KHlr+qy/4it4VPjbtk468Qivka0d33xbAU7sakzI/V//a8wSmz5ptw7S21Uu1ktvTi4q/xKDBCUz2eb8

Dq0H545sv33G7wArP62t/gkD038GRGuyjTBvrhEyy2
5KW2d1i27FnER3p71z9nRFchgB7j8D9eO76QC1WmcRfL05nH4qM6dx1ejC+4ls11HTd//cGN+KsN+2pH

9S1Zl1jyylA/bWkrra0oDzi1J+tXe+R80t2l1nc01lCoFxsQWL631CHDjnuA6axTdpi8eoe1BoHLGB6z

DZJ1ub+/HESnLumTMengyKia9ATzb1IpsDdsUJBPq3
aeqXTq51NmmNbedjIydrBO9iY9crIZDXf0UYc8ZHJrQnTbXA2H2vCKDPwQDDq7cHHbMa8+vXKf0gTxob

cZLxIyLaW71EdptejG4SChMVbIKdMlqOjxkHODN0H4WFv0bJuz+gryFKfqvNesuQ1ruXe8KJutbnsS/X

1ZHVq5U0sRDRORdBDm0rhoX2yRbjth4UXcMND3fgQu
9/fhsHifdbm/Uo5drWoV+rp66T2M3xXaKxTAovJbLS7SMYvfHu/oxqo+1RxDspk4ElIiFVzewOWDInbh

Sm5sZtSxm+4nIe6pLv0dHojV6zJFs/qlim60wH9xRt/gyXBv7lqZL3/bU1mX+z4dAQmT8mcNCqtXsJHd

jRscsf64cJJlY0deJcjUEsKq9MfwKKP5fy8iv/ZCpc
XyZ8ZMy+VR0uoeHdNCd8ESTCvz0w7BvUNH40zPaDjIACOtXCDsRd+50oNK5AxJXcXoukqqkdfpJ5F3jC

MuVIH8ODS2Pf/jCFtM1c6ecS9Ceba8wcO6gvKyLkj/iykp8PmhdF82eYjb8p1sOayKQntLv+lRFUn9Xt

Mi555Hp7E5voUQlKBGN7hQ+2Rvj10aMNDvGRO9x6Bz
NtpwOiLeW69jbkKRqaM2KUWd/1CwK2vYaOs6B0RJTGJ5pnCYdXRmXOWTKW5/UNBKJYkPIK46ibmMkjdQ

Sqo7oJFuNjwGlXwneNQo1X3AsLBugALFZjTkDdqtUkVp/ESAoLO2UvAM1U4iTJnPIlZRlMWWjccvvhIc

iYpgMqlo/QsWQa/KAC6sLcUEFUJmXDe9c6i66J1H5x
6aXGXyF+I0g000TaXIbYzY4G6GsV8DS5pIT9HlUgcfiANgkyEBKojyE+iy94nug5kXcx90qRTUZVRlCn

4GvQUrFcv/FSyIF0ConTgH62+cwcc1OVUrVJ+zkT7QLT8oHDhuRtpQBcC9n0LsaDKU6O/nXlJ9X3wW6w

Cg5X5SQtkecLWlwj1NTa8ccgUwhU+7yxnPam9ab/wL
PFFwAm9IywRodnxKIbt4MhSxTPQ/ZG8HWVDwPkI8BAJtt70xL/SR9h843K5Jo2Px8KyWV/vC12Cz2hzI

BFbi0vtZxPVJ6eXhIkz3Oj9C2BTGltbdhd556u29hMFl7xamPeUuqNzkUrf4l9zYqQCbhruujJFFycOM

Bjj5rf8MSr3z0A8I13gDw3dqp3NE6iYIvKXXpS/guQ
6Eg8hg/dWTPhRV2a9JdwVnpQg6J/7rUGtL3h0uFWYM8NgyZLpNrUavmLYlGDUGWJbQV4qdsK9fdM+YMy

GDBIQUNHmKYd1mhklp90aKNhMr5DhAmNpYJHMTRVzp4CPR6oZkTfehoF68vJbLHfCP4FNgfNnLTITTzM

SgvMHvNRCHPGO7W9fvZDvIjLR2JqsXqUOcVUjR5M/I
FVKUAqeQQJmDZVEOf7w74BsjJc8/rZINYsv0BIHoLsXOX1DU8U8MLjvHgrHYIvvBFnDjJfdDQKe28oxq

M4kgLMErGAg3Hoif4efvdCzIDmXnyJWzN3nEZcUWStG9tOBXFts7rZPSi6RuGfGleFGQ1tSMEwmF8t2p

XzFGBMTa0fJa9fGdV76dUVJRTcNQzD9miI6ZegCOZE
oX2SYZpLxBGymqFYtVIlricEuKwuWEqtwIjnb5qU4m6Q1Uf3yqReUGg3SVnW+WNNfiTDuUaw4B02OjM9

qwae5ciBu7U3xDO3WPib0CFwHQyUT6Ap5kVxApHTaqDaLgiSgv0PAmHHIpJ+rEyfXE+ZOuPjkKPwnrc2

CCYYRksaKLMlQPCyMRVYdGFvfJF1axSGUY68Y03fOz
NRqd9xmM0TIlni72gxleTZghSd0Ewgs5WCkxShnsgTrywKK84vTnKXwIXp7OB6GLwc044iibSRkaLhYL

IrWJKHJcTrp2Bj2TtnKt1TTTaLM5+9RcfP0HkKy0/XjzAawRh9aa5FYRlKjlL151ajGjWGhk7tYN31ts

KR4hd7W6m9N5yQEV/MA8cjJwh8GJGFiC9pcE8yAwxv
Pq92KsgevdwLWzCWSACzDTivUvm8PbqOJ7nqTSSJjQrXFXBAMXJdzRJqoyfq38WW0Rbe4rYXKQ7X6sXh

8raBgEdaV9NzyjiNQ7nuf3O6fhMHU4DJv659EjbB2Bp1QDa3WrM+jpzGdFqpzXbK8nYDcPOQcvjKVQ5s

V0MsETRbP5wrjuFhElRCUDiVbAYd/FlWYA72vLkSJn
3xjEFL7zj/6ymXpP3C4oWZtmD8722vrIU5VB8wTdoMf+fhvxjC51WMZyFx2+nYpGITSVk5qxB5iPnnZB

LYnOuaga3KBnjilJffJ0qB3JaNR0Latrh8vAQCNtUKLLA7huPVEOR5UfJ0sJaV9HTZbrOPNY7shVyN+8

HkqG53Y7kEZCEastV4tLtyOlFuyiu+g7L+USG47f98
9CNmWq32gqa9JDd15Vtgt91E4sEchuK3nuKUiicR+TMMDxCjApWYju2x5QJlU6EfkKJ1Pc9gKYPGPZmO

+7WRoojSlWuNiO4NAvutplrcfpB3vXFYNpAIQhVkEaIsAbxjxQHFjpqcloBfaHMIGxZfPDWVi9EDaJAQ

UKlfiiadXonAyaInPHR3lNwb05jFUANI9jRipUb0Ce
oD4ZaPza5rekDBUQequnPIbk5yU9vYMTp2Yd/QlRiOtop1P1o2IwxhCmOmdS1dcJ5NEU3dCED8Gof8Yd

87vxteyZ65oG78iZ66qhU+K4tXR6yCsidTYL/FRXTkYaGJWUkcZCZWVcMHlZ5/tIJWbUIVPb+UuwAVgo

uCuVxmc862rRVYNc78wRCbIAGZlMIXrW336UjN8DVB
sUUKQdZI9HdNM00VfUBvodwCG30dnsgXq2uS4w5XtrjFpbo+fUaadaTa6AbyYQEu1hX5avaYgn4WMNyh

77iOnwxFONWJYR3heAwjWFxcBB0DZwAYRPemK1CaqOY1Pd/cC57+wco7EmOWHI1RZidcHtNBKhQQoDVk

zICtJYcXFeaDXSexPP5FHV7orAWcgXjRsy1oA5Yclt
eHkWkQPizpJRnn4nXUSkoqt3T6LiECIR2flrMhuPp252YuskhJ4oop+yRs7U7Xm1rp54D+e+y8Us5owo

5NXNsUQOBwaIQChjdN6qgeAykVcjIirEM2mpMFivFaasR6MXe8VFE6uC6ntgZSvRz+1MXRrxJTQ5rkT3

zz4u154bcyiv55bBqf6FpH/tq0oJsKTrL/dYKgWkjr
U31CPKskoZ8pAFdFAEQTtDxzrXmZEPhSU6H+dTrWe8EwKW6uyrcjA3U0HKVd/dxWaIhgED5U/Ni31rgG

bKGBXA3d9kgovUvEQtdHaUE4uhQ3OwbzWNePSnP+RRAMfB6YJhGVUSMlsJpv0QkBrUfYt99WqKkTfgvZ

RTPtykBBPee+L1tgtJ9lRCI8p6iZxRhshN2CdRCYOJ
6ZZhUbllrVTkpB8ZBhdLpw54nhY9NafbYUi9Vx19j+J2qI5EUTvdjQ5zdDoCRNW8bfBYQxK7l3DVBFQT

dNEWsE2aMLYxTaKfeYHQI3zIkiEPgoSMSYZ5L4ApkXPPNeCSSHjbfz1Q8FVPHLVsC3SFTWCWfO9t3NQt

4EXvoocDggHLoNpG2eNoiRkdKkpMpLJBrfothqtOtW
VOSXBxbYDemH1ggEbu7gIkISz0qkcBd3PJJcesM19ENJpMqJviGdeSBimk1SqWO8nfuxtLP7f+UEpAO3

PDg24S1n72FkdPyU3d49aMr0CIWxTWDICfx0mKiUnLzL3KLXXalP8ozHSNeeTJOj0mHyeWrlJYnRcwHM

laBjEd6BjkFfogDfxhNkodIc2jBJsyqW4nuYhowBix
vLnvvLnvvKkTN/beE34zj01WyU7K1BMd21ZQKe8qxHbt1qMxIyjcshPp5fEjB6kJoMd3T/beGLJSU7Vi

bujzuM3pouBccprT/r07hAvb2YXP1SXr0WU5XEc+jpCOO46eF0AlhbuW6Y/sn0OEN+xZ1tM9LhI7S518

qhHGMP6ktNUpLt0d/Az+0pFxWnksTzXQVpo+8J31+T
mPKY2qKu3QMSG0LLyjSn/cQG1ak1hO6k12cAqg9sDd6AWwV7y+d5Ue8UynCv7aFW4H5uHjIvgmsduMha

X2qToijzgw3TMVCois+Nuk7vnNJz/0ge+pK8LDxgZM9zAroaU9LEfeF3FscP+vN4XpAAo5fQM97xzAPi

x/gLOIZyACMoPOo79mFlaHO0/IxBFqappwax6nikSO
vEW8jt2AXEHJ3MgqpMyCRQXyiRYW44NT0KmYX9XVM5yYIq84TFfbN6cnQW4XDCsUGB9vMgc3pEmBwvFJ

d9ZnsQ+cfeAcBeco+FHvCnhu6VtqCJcjFk57oWOUO+pCxVfTduQoZ4K9PQxmVXfqIJNmvvaFA0zHhluq

0W7Tbtvs2IdgJQG9ZOeXdfX+Ms9JTohotU/rQ1UuPO
5qrmaaygjLpXYqyoCumnmZnp1thBSt0IqGRbT67tb3kCftpOfUq5hLED9OCrpzKM5ZWmMp37NfdhUBgD

gXWd4XC0F+G7/tO2B92esrAUXjLCoDt+w7a+WxXLDwcMo+ZVyRCcWpYQKJ2zY58lok0WanbiHGi8YVGU

v8cdoxK9DgM3qZx4cZhgRG1kWAAKac7X2oP+ux76ys
HQEHAwKDEjlFUI3yLc2dYl1BfzPoXjgiWWWQ9ulhxrK7VaHvcsmopku9DJ+vRZafsZI9Nd/5KF9aWmH6

GnVvQI1TpBN8m92a93S/n8LGWg42nks62scMi3ihUk0OMYqV5Jql0bCgxOCYBrwSguEg58jtn4TZUHmT

TbjXYZZ9vzyWqQ12sngx6W7XeBfQs/vbvZujsKmREJ
+koIPHWviDuc8otdUIfQfctbMSUaWqE3SvipeqUBTN67dnxIg6tCOjYAWDCAFQg2i1G3OaU2sVg0ztHk

jBInACSgDfWRW+vfo1L571uthtx3dNq89TrzNkQpAMXCvGbbvLQLgHy52QvswGJzSIIRzV4KCvSZzIDs

NoVVEmlVeYdqPFbmTQFTBinmx6bmBdKbVdBda+FKjQ
SQmMEhglMEpgnAcsU/FTp+OsYHHGnNaYgj3zdd4Xumc0UqHHagIj+7d3Bxqfjx9cm7RjNfli0vuebxmC

Uhqkf5iPggJdrH2QB/TQqbqhpxG8SHKDUQXPbPWBosWK8N7MYDrPBt7aBh0ERxDHvujvPgYpk6JCkrok

dKLdseXFshXLZOr6MpUtHRgKp4UPclq14wbzm0g5vg
USUfaLErK7AIf5MqFsxRGfrAX428Dov2IxZUFsi9BWE0hq7e3RAkuVI2Q8Jcwqf6k1h/TQfjbM2gp3Fs

wqytEjf34buzW2px65yf9rsILRKXdglUC+ynJZ0VOvBZcxAz2GwDmY3DUTrZEC+a0W1MZgWwlmYrqrfi

g6sO+iJ2N5AyBJjE1ewMOmGUbYN55N4ZYMAI8iQ3k4
AfELCOX9tZLpt7I2b4dbQN0Ak4R7ARxWuPVs8Fno7NVJTF0MUB8QFqinz2W5GBQfTJ2IeEU7kMTaXz5M

B+5OJjIEymD1R5U3XjqZDvjGvIeZTcKEGzbzt1CYanUCMszrF3lNpIuZtex6VuSCXBCNAHFZIdKDbUVH

P7wSN1hovZGRDCRlq6PsoTaPq6mJY2zSUoJutpJ2ti
Np1cuw+jRzXYlgaxYUTMEdTz4fd5mgFGBaQ+kCaRJUmZb4kdfcvpbh0zosVZBIty2KQ95nJm+58ljqMs

Ti6MS+v0WzP98RKa/lgsnLetdXK4/ckwIXfMY1WlXoy0Qr8Ai5P3usIvWI3wEJM0B9CnsIUzaRtWu0D0

VbYFHHX7JseT01uZ32UYpVj5rVnTPqiqQ6anPjd2DF
lPBoO4y3qgYAOLycJqUyuFb+KdCr+8pWvpXqdXOQ2w5RUnfi49Rh5O+aiyucBjk3qv1fB2XOIjgEAbte

hSonE8R5vEjWincdk9IjRPtvWafUch8XQ1VoyJ7diM3PHOdtHln6CPJMJ8tmcfbHsDs49ZUfhsWT9LHN

tXyCyA0TmThlhsSeUZgA2eAOe5GX1NjZzOwZp/v7y6
UHtDSvl4XASaeeNEO9w5u3WwBptGE2FcRAoFORJbiigNdOjBu+Iy9BZYw4MrpYIf/12zhAMHNwEA0cZ7

t0BnIi4PUTGY48zhx9XYyiRtJKH2hEwZJoTX6R5UkJbF9uBCDpRKHkyiwpVJ3XAcgc+oC7vgc8MBbIYW

BPdiEu1r965qevDY/NNiDIRIhrSp2ISOFAPdTxlFeT
fsI+UPfv16UbUGhD6qHMLhJw7I4og+VehpUs+8guoTnFq4L3u8saHawaaPEtKnF+MPYBYnGUeSzKPBat

RObmTylTBXLLmWStOAlmpemn64zQfGlsSjDlg+KugMNVzDLiZuMkgt464+BCvbi2NyUtdaaC4CCZqF+C

dZHxksjG5gAE+AENkJFRFL6qxfGS9iWb17xBbL/UcN
arGtcTK4/gwoLTHCctaDz6YvJZbEQlIXoOdgmX6bY73haXovvlqNW5LcdMkp4WsmYL4g3FD9Hibx/3f/

/S5M4t305Yety/xn87k34rZmW7abqHh/aYJ26dFUZxlur5fnioFsTS1OK6HBbdSFj31wC7ryuNdxdfbB

01KKPQwJt7S9pJ7GehMmvRM3F66NwXhoiopAooa+J5
5+g2J/aE6M258dsj/9Dlco3yIo/vy0VDoPi4GgCZOIK+EphzLrqVrEofE3pnQl+ZHBrp9XJQ6p3328iy

j8JBdDk3Fgz9+tkxn/6z1hEwfHrnR8Rt5MKe4Kz8507S9ySt+fCN/L+NEqQmzYI4J0Zn/VSHnmsb8ln5

uOldWG59C1zLxu2OYqqvzCO+7+3zj48s6fKSPfIQbb
NtxH99Iv2eC/T+hqRGKVq8DIT5w1+a+yscBtxQL1BAOoDry7gI9orjB/JRq1lz++Ny8z6V7/AJh7977g

vx8UgP/mB65uiq2Z/u/Qh8l251y2EV+6u+Zy2o7/u4sBMWW17lmPGsI93+ndo8Kuup+a9pDbhkW/9/n3

d+163+Jtb76Yna2ArzWrs1M+PJQiW9tbKiD721Zpl4
fGktPw/04avXX/aK4MWc2SlslNm+N/TUfqGFv1Z53RBGRF/r+lwAN5l4PqoIC14VFI1V4kcj9yjfb3f9

RM2PY8Nb5PuUhvHMN+oPaAGtoAdoPG/yx7yC0L24mvn5TrYn3kflT41Oeudc/KE7wssGbdd1MT24lSyM

+qpoB/3y/vcPf139rzrXbf03syjl/NFXNT+PvprrzO
9iq45D12/Q0F8ABM200mBsmu47SbET40Zszk400Mb3B2j7UUy+q7rc7+z8X7Insybn/ebr94li49v2md

9ds7+qy/4dna6REw44K/yv6nK/UQ1/PSb3sJ51a1vyj7NSF8WaZihu6wJ/wXp/FSp3wE70h8ougqnyP3

XlfgOJ/8ty6uyO/Vd1ud/o9r+qy/0G6/3FVy2haso1
3F96N/3tya6lmq+la26V49+qy/7TCE7HHt97fgwf1lX/IY5/DKc3Musvy69K0gvC8G/HvpLT/3qL3q56

/ij75R648S/V5X73Z/6eBtt9R/BXdbnfZeO/cgo4lyM+LG5qCwonR/QgshgVSn8dQgD1YE2u0wxl5Sa7

Umbm8xxjzW1qeXy76Lr2/F0vm1Hc2ZiSX+jD9+jnkf
pqn/+nvsp+OPoqvxFnN+QnHKXlyV1jk5gl7T57qD5+9Pn/0VeXPt+C5347yyEm+jBj0KtsLFAaSYujkn

azoTzYxvtP4npXd30qohps3Xn2ep426hC6MF4gsw/UXfqV65sz8T44+biESfs2bNWV9B8B3ZMrJUcxfz

fn/5XGyvyRCgajUIe7hn3ZT75aI5+IqF3vkfF9HlN+
1yF1MHf6zcseBG8BU2FXfp5y2uq4Ed6vAX+c818wHeiwwS+BOvErbEW3AaN8kp4I0+N5Fc+ifV7ORgZH

9AQNvgq+2Y8tOzQz4ou4cu3OVWzV0Q6cZ8E9bD8G05NsNpxshI9n7K/M1Fee/1fQA/QEbaAXaAcdoNtf

mRipzyNuQ03ORfXoH34WJoSiJ87L7qC+C3wX+KZ9lX
5F8pVBTfx3D/tlM+2rotv/yo643ZUnSgye22ChxdR44JXS7dLgmV8vFbKb8c9ZmC82m5I1xrrB7yXwQQ

+gA/GvOk1FeeY13/6yjB6v49iwtYwszkN29scYCa+d6+5aebWFte6lz8Nca8+yscXxIcEOdDmo4pRz9k

dVFrBFep8wtyA89ZMIP05oB/hL5Q25Ws+s1jcF8n0W
01jq9Zi8Gysky1S/0VP7r+bqy6mRQ7c/bB19Ldt/H6hTJ/ks7tf/z9p+8TpL+68CqLrcH+U9/5yk41rX

yTreL18/J61aCg0bucD+yvWaT94z8xbuSndpEsF/d3v+qi53+WUG+8pm+2WW+qpowzXpD+b/Co2K2X64

qM2mNrOJ5RB0AKqr5XLHOgSwN3BXzml2Q/tv9W7rz/
Y07Kgk4EqfYhid+LPvmy6agl499jdFQ+r4i5oaM4DBXlzvTT0u1TlAS1rj8mw38+heuNREJn5MMmUc8Y

O3H//uLNJ/eNvpyVRqBEcbV5jnTR8ojS11+IOW/qDl/w/wyaVda8ENt32BwEbbDR/g72BE1R+fKqhs3k

k7brt697nBPTuyaby+AL5rOmaqb+pyv/Fpf1WX+43X
+6u63G/M41/V5S4/3NnRpj49ErPxqOm/qtj8Ell/NS7iM7hwr+dod3wgb4HR+834+ah59DcUkxayr077

QcC7mom/kv30w778V9O5s585T9kBx4jIltdUe/+tEfgMGdNhjvl6d05aePpg0QqWV/83+/bYV8/MWtnH

/e7oe61PndPl/tjs1edY/ld1uW//HPsqffBzdtQbyv
gJfhni7Bq/duvk810i9Wb5hoGlv/rq+R5y8GeqKFU5cXsolGFujt5zB2KpPg/UVzm+i2tEStjbtU3Cry

QadmfS3q+WCej+Hi3r7+Ue0qTL4nyht5DxZknnVwplrr1ef+6HVqMG1mr1kF2u+5e2OvkHz5ZksclNhz

t9+QO6/WDmArckkjweHS5a70QAnIi1Dv5B1Cn1mfAX
7L1VFxboGUYDHc7Q3bs8J4TRZzIJKcvxs1+V9J43kau5O0kE/L/5+tPrz/33mfNOgc645T//hjoBdo

Zi4ti55f0FApgCcCigvYLhB2kK36ehR/6Fhvd+nvoOsDWkAraPCFfeUKvrpAO+kFOh5IP1PiJ/oA3wG+

A3wH+B4qInBL1TUd4QhIn2OcIQ7Cbrnn78svxs3Qt0
UVB4jgUhY50DK+isdZkjPz68A6eiuT5iMVcJtGOy2pPf6dD+gJGnwX+B8lYsR8qWy5xDm1cc6MfMhC26

vZV+c6BPc15St6Ts9+9nqx77z02qBpaNWtSX8V9nFobZToLaH+0i8K440x8581f4wb7kdFoaLmIri9ps

pvgA0n5+YbT8+auMF5cf3whmieZkLyYUnJ6Ptdlcz9
vh3pjYsnO6z8NtUGJZ2s+f9ku29tA4QkP77j0KIkIsBaZ3xdf9RTaLo9AqS/CdpwzcL/954E8QdV7fd1

VV4/6s5N6ChYs03x11OoRrka4n2lO7t84b/kTEzNq9Oxzw5nVkbXWZw3vcbOsHIF5byv293sEkT/qi73

B5/1C014Fg3lz4Cs/tva0x4kyw+0OdeX1FqvSRuIxz
dsf/w90arLhwohkrD+dj0b3SLmdA6d2gOl3I8CmVmz2ogsnH0/+hiMCxpWtfNP1kbve2QnUa+pq2qnZI

PrGN/DP2HR9Xwx+ZAzB0KOKrYmIv4pCD47UL2sPa7f9MkjcWKHn2Nr/+f2b82gM0u8iky/jWsmp0LfeF

bSH8z+TH+w/m+7etT1nZf8jwy/r2/n2WqQWWhaKyNq
eQ5MKt/rfZzIEaJ7Buj8rVXyWkrh30Un8fyTHYl2K2+QOjd3dcQRDv26gSDIws/lkGDSak79Lin3pO0v

SW9q0l/R7m73c4SS7fxk9re8aZ2tCdRgjaBp/2bs26qW+ov1id0a5M+qy/3atI1WLq15ah+v6nK/ZzX8

IB7z48JWf8iCRj1mXb8g0K1/cGabxx/IvYT5pNxgPQ
++6Rqe13Kz4f36NxE4n8/9DV9eSq1AV7qf4rBEI8/6zDBSr4xsCKImOi9aFZM/fI5CKj/8nYPJpvv7VV

z43SCMBgps0obs3aPUSGjt7+/OAciHys9e9j5zuU9LVf13R05ioJNM4ozcxTgrr4uf0xrN755ie0Cm97

MNfbXTvnry//646gv8AllAdohed71mfazy65C68bl9
4q/2ctABur/515ss3Kbb19po40oh747+5OsOrC1/tSv+Ra8dvOD5r904dc5CEwwXyLA/teEPbviDG/7g

ccNy4066k+8G3w2+9Mn86Wi4QKHlK/2i2iHznD7GOPTie42vE8nkFpjSTUXYGM07FabEUcU3JzTSau25

TlFcKJfBtofvE6TbxJ8gkvtsDEnOINKc/Y8cUp0ltn
m6kBO3u8akQe5+tDFKZpXcBNhHaIP9jlt8FMYluHWUqIFpcQRCrED1fpDnfTAsd4V72GNsH47Ps0nRLJ

hx2ygXY2M9PUiLt90AaT7iu/3pNGl0ZDw1Z70oiGEIXHOYXgIAHPVBGNET3i3ipytlWBPNDmfcV4BAvs

IsSrAogXMmOuJdH+g70AKWJAPPknPNuLKwTXVaPRVO
AYCuYZG3VIqQGUAWEMqBRhK/FiVe59qJArhen95AvvGwMBpbkMHH2X9ZuXkJ+mfOBn9c5KcUiqluo68K

Zt3OKLZyuSDq5l2gXNPQuo2ChPe4lGlmiY1N6K9ikFB9Sm9mLeISYgNKxGxv/9QIzV3zDVV3ApkS7O9H

NBYfevwuUB9eUvqRCeYRS4ZIAP8FrG4sIMeUvToiZ+
1XldU+TyRdh63d+sysl/1agOZ2UOyiZz/AROWJmhmM1aXW6abZ6z9FLASOM091UqTswxetJ4HDwGYInJ

l2/HMOldlx4GXajHxraJuWvnx0GiBdOMUh305jz55RldQERgr5NlWq1gW5zv3PpATBdmIVJQCIA9jSp9

E30oYFLvyCHdrpEsTpSoqcpstnkXDpGcdAoTFBMAaV
YFtvRu58QAbhAcZRhNZCvmz5svolTRcNyFgXi3NQKr+PtLzgkvI1N4QsGFt54XgUx1h6aHvind/W5e5f

nsW2rLA4iKxnEfx9DWgNc7dShSpdMaCB2w1bK9K9429NlH57wwTbr/hVLNy8oHDdn62xyEeLJ4l455G3

TX1aSlus0O4rcyTf0miov2apmf+hpsrE2h864TKwZK
0s1mINombi7PwYoNAmlRGYjI+wHLA2N1Uiwb98cxS/ct01Hr6Ja5d826oT/Z6CmsC//m/T5n0F7OcGVu

Y2EYw0zqVwItv34wcI5/nsVazFln6HOr0TgtdUPICSJ60+5c28btrzoRzymdfXFa695GXGV96hpc6kPe

j7ygzIg4rdiLpp7mEZ5n01/RWAYqBbseEf3KmO+aHM
4L2RhTcaB6t5xXit2iy9+0Ku06n0Br6rGJF9G8Oiaz4Q9ksO62+kvROdPHhcBRDsQxreZc3vrBx7ByQl

VSqKvVKpYP+a5Bk9W+0Pcr1b3DOsKZu8eUSwOFqyy98piQcUfiIibs83UwcoWf8FXwZeIzGClfOiAOoL

rbRoGW9YCiXV9iZsRNxgNCnLuMR8QoR7ov6U7GmoJp
MICPMCCDSRJ3NSID+pJEqE6qLoCgeIDOaXSowSNip0TPSVPVvn7HGPbJbmxVBJgb858b7aZVuNpVUpDE

HrBQUFeKZ5pVbSCkQiCfzTeNXuDMeVAjUxSm2RAghvG5McPtVR5H3EJxgZ1CL6OFkQl9BkScX3UAIT00

TrDGjFFFsA3RtRDzW6sXSUcKOLLXSLkUmImyNAPzkV
cWiGtwLRKaQataTFWkLwMiWHtjwT2SDz7NgC0gapImqOyUkasbFz8pIB0xMKrBIVQZXHYHph8AyKOccM

uYjy2hjW/b1ssw+74SPlYWVo3usBWPD0z6F7F2BbgLv2fkC+99GlEWBLW7FzvZNFTZUKMUueVFU9gaDb

wQUbDQglEEoglEAogaAPKv/Y69zN8DI9DzMQtpvzbO
uAvLhEwcjGYO6fAyeGs98oodyfUXf2ChcSkV87lJVxfZGFnXTcyogC9GwS4Gy+0n1fkDo7LgWCI0d8j5

iRRDmEVjuYra4KWcKAQNt2hrhjtsCIFYmKpCTISUYEFOKqpbO5KMkZyfRcA9cjqWDBHIzBhO+Z3Vb42j

fN7ckOuVQQLRcEwHIC5Uv0CAklKSfDg95y6AvZS3VH
JymSgyBtNDXCrnMS0hC80xHr+EMgBIp+D5gfHV2zBcSqsAUhhQuij7iDtmmE9FzNBIRpOPWcHGMsFIAf

1UxZnMz9NgJYQnxaRxIWZZe2QtEoPoUP6N7JZBWSUBLcLXNTideTnVzYVbLmm/55FX0OEDN8P410ffGi

5wjYzaeXH2rmdhgtbbomtrLZmyzIF/fX1gIBDoTBbU
ofA4968M+H4CpK7xdawG7VqyJgH9xQFu5l6VjjUt9caiMMpeOiuEJm3ehghDif91q6WO4MUZt9bVbCca

ytmUQx0y5gcvn1TPa4oBgpqN2f77n415ljWHxr7NRSILtYo7diYKY8uWNmTyjiylN3MWvzDpQRcE1Yb/

j01hc0VWxV/YNfnqAGSKupxOPj3nkXKuYhxC9xF0qy
0G/Bxp5l+AEy251JlX6ZNfqjKWfPk7z2Z0aNs7a5y6XQHaEdHT5z4c3aYOzTQW0ZjauasMr6QMxNB8jk

iRI+vYdK2aDSNPhunRrxlzPmmZ3Sio7cI5Eg0+z6B/6WzbEOvlqtdcP2E2OAxilgEyR0DDQnPA+hSAax

MXAYPA4mDmysCgoDDgcPVF7MelJ6grl4MW5xuhYGO/
uGzYdDZBXOyGFT9TP+0Gn14KPcEYcLHBoleLzqSfJcyaguLDlcM5hoorG2EQ+HWvTCBD5EwAnYRMxVKC

RIpbB6ixs7j72CsYu2wFqWGhZzM61XXQk2E5x70/ToIjTMM8HdH+4+F6D8JxR2EjgZu5vRt2BaFYUvYV

mARDFTwda5dMniVdSs1umWI2hAkHXYijACCW+KsQ2M
nEGCMEtX3WrtXR1EvQFTFoFFFug0DiHwcqf0lVUOYUrSEGQdNUmtFm4QF8OiCYXGNskokSKnoIUtHbSx

qXKcaPNujQY6SOUbtNQeaUGdJozrEgvFF6fbA7P1STc0SMQO7yPTys3znJVjPmtBSF9qT3A/IjhY04Y6

SGnYPHTDDUaQEAXW92XV0cUQScUsvuiI04Hh0HyQHt
C4Z9qd6P/wKTQ+rWvGqCNEc2FAnm2uPSxdPgX2o+N9lM7TBOrAyDpLSQzle6xh/UGwgDNO4qXuMnLuEy

SwoVX0IgquiCwBYulL2w4O+Eps0alRgjY8ZVzoIOe11upiGox4t96dfO+37/qVL/pdbQ33V+8TS1pIfn

YhLuhzrKxzM6zI8SbxQvGhNGUmR7AhScSXub86iih7
wYUbmr+0wsQuVkBvze9N8TRrZSUAKXLrMAQcGPHoPPlraITKA4rYDDcIzXnHSKaLzyxbtc9b8FISCfyH

RJaCgWAIihNTfsa3aaNe73uTVodJqg97j2OC97HOIqQSVXFCF8JZFlLRJNTAo6IFJ94ARCgIsCWzkgq5

/QQEf4FJsvQ9RdsvVCZeZTLBJGbKmKlVoQl4v1GzdU
meoJrl3KuuLH++wOfTf829nipZP34v+4g+VAijksl3Xm9Hc0skeEqKzOS+mM0i3LfvczK/mEewjonBj8

9bx0RP+mZM7rVe+TA3mgH1tv07ziT9b1Y4wzAI7JIVVXOAS36SEyYDpEkaGPQarNK5oL3LrV8QR9cSVM

PQCR0MsolpH42s23yjePoXWlB6ynY9r62tn2IWYPSA
CV7BOk/5TlqGlQujIsnfF0MEWJ16pMbx0ClNOJ/t1wS9z0I0YQWsY9R8Jxrevg5Gd2CJIZHYUHOhJu1D

iHd/qIH5G4pdjyUEIlRdl62GgRwfGTHKQQwmoKU9tXk0/151Dr5zrQuFLfWB5AyHE4ilIJWfPn8pFo5J

oJey1SAvbEg5D4IXCUEYL2qkExeLapGNRhFHWNGHVq
YqLmXiwurPeRoQH5oIkTGcqA2s3G3gxVcFuJGa6btu36Bt/RJBd7mNtXRaSTdzJNpDPAqjRp7RfASHeS

e2AVSzG21pvHH49klwja6xCWsIdzUaeQJVLLXR3QcmhzmXuQl1mCxCjUw8iXQEzTRQsHKCnJunRxcU1v

lzMEIkJEImFAIjCIyA+aIiCcAtye7n78PrxTmVdXTA
7k+crRIagU+Y14S6gi0z58ZqrQbcmqkWCkPNgIPn7edUGDGXzRWpWhEFSFDXeBIssQnr4wecfJp6Wubb

HPGSMijzVnPJ3clDQQWhrJVpEjIde272JVywYz9X9BfLUreEyJTGUHonvK4vg0uY5fEVeehgiHiUN0b/

wFcXzAaNvXksNRGU3YPxpWCDp5BB44gN7MuwNjBNn8
33evLWj2AaaEXBv3ORLGaCf3VjWb1pD24/4l/Amd/ZdUVKhT97aw/I5KXyBgVAbWqL5jGfukHxmj2Ovm

2VHkl1f7qObEYwFU5jlfIVuGCaM9HFLYFRTdKA0FW7kOVBHchuplWh3XOed3hwgk26C7aif+17j+XLnV

Go18p43Z67aE6GvjyE1bPRApR4C94lwB7/TDskFZvm
icU/YcPH4qM5sKV4rojYYJbN4qTz5VvqZPStbCO4rD00I+J1hzUW+PAcjslIlnQFDfNYtJKw1gIsSLBg

BZkwcmdzHYvfQmI/PBkPbM8T6Q+mEINqKMIwPWK3wYxADnLn4cW8ay5cP/enc91K6Zs+1CJ3EGX4f2u7

MZWVNIBE5rRbUZDV1bH40MO+3AOGZlG+3P1QX+lY/F
Wn2GlHV8iPx4/wWj4OUtg0izhAk7Fo5w7licIT9OnYEChU2hT430xf8vsoAH2g03enul749wD7hOj+3C

7xlC/75iBzMZZ3e9A8JRU/H+XOHtusFU/2tHHORVBdU00oK2Hsl48Pk4ypC2d2nmviFQ1lM0usa+2rsf

Ah4b291vF2lTk+3K6glS+56kvGA2awD5Gma+8wli/3
DnrsxFMMI4lzTXoA7wjeTbuBt1krixLi3aHglecf4Hcj3dKkA1EpQ/wm85kXutNcBjes+25u+WJ33oN3

cvtLC/ly+wUI7uA9/xLl8g07FQhNAx0cBiH/OMq2Qu137uT204YHzq+bVrCuigUsIiFNDMLW3S9AdIlt

sQrN8THGt9uVxjIKYsZ7qCGfH58HJpJmGF97wEwaXL
gab7hSAC2duXcU4I7iJHpTXLW8J2bD/jEY5T3nZeyWiCQt/nok6PBkR25l/qJg13Io4m8d2cOGkUiGVT

rWURSsddCLVCVZQIHRAufIpGApqpjhXEINzLYOIDzHQyTinKmYotmO5A2SnskbhBOTxwhhHSP5gZGgPQ

dyGqsu1GrQEIfAXTln/R2mz8OnIUdCJBpSHqqDzzmk
n9fRBPuPsoB/3Ai2E75nE8ED+xPNsD/RDNo+T4w6qW2mEciWSxOJdQTEJuMwwPDhOW9fMtX4bGZkeqMi

4vBFd0d+IF/lmVjXxXJjUARrrTtlZzpQJ2SUG4iZZZJVmFanO7uWHWTBfNb1Pp7ekeFPUXOzOHvsHZSB

6Wr8HfcaojMnCxKwTNmIeZ3JkJxz+HrV47k5CQWnMA
Wj8udOuXevVJ28U3B4aqyHwi4c7ugS86fhPBh742G2wtuEZTBUhesoZEK0VZRsqCn9eW7leg3SM0UGB1

8ZoGeeGSBEU0S4KlAW05I4lwkR4gPgJFI4id55/2D9bs9py1zTLDCFkVqQ0sForFrJwN9dzmdzbD02sa

9QLTPBXaHnOuLxMTYKpIIsh3+2ki/4wQ7XNBqwD1UW
e0oQi4no+x8k07tg6sp9G20Vl0N7e/bqtxjd/4sDB8Cj/mjoWx0FcRi005QZeafip4IkPkfJgrVJZnvD

ts+OFcrFoNzkyK5C32JuawHw2GRpYIkzEakkPNNo0OJ7xxMsUoQ1shBG8pkX/UpbZaUCwN7gYpE321Eg

Hlv2xLbNFylflSVqJAZMaGZwRq+8lP4lYpMUlP8X6c
2uY/RRrJJEhZW8yHXlq4BCjwJQ8C7lLq1yqbJhzdGy1LuZulk812f0VMJzgqAKtzc8JOLQtrvmVQ4bBT

c7uxG5PwZERQjG10z3a/eJxj2l9Bm8qRUjlhgoBTywNxOloAVzBAtNf0Zpnh11WI/vpjNLkvZ6ZnNwW+

eXpPAdgpRBl0H+C+tI6KgYdRri7CyfqmcRaQ+3u8fI
u8hZOSRl1wbLxc70+m3Ebcy4neXzkswJ09176Eeoo1qp9fxlo/Hr6ekeiyHFWbTSsXBFBmaww7Fp/Ewb

pA5V4eeUuTByMQ/qrpklvVpmHkVJ0lFb1fuvhXq2uvVS1pDk3Wo22r8e0r0X3pD217ZCUuxRb0JC/6Pr

mOipr/w0/D7DUJF79UafACrNgsHX64Fy7gK2v6p0RQ
flWhflszggLh6AvUKv3IWh2zSEYtKo2OyWQqdPpjMlMi2ehiu2GKbVIGcCupyAKJqAPCNUDkXyRLkzpw

OzgKcDHFK3aEziNRSz2n6hNlrgYTClDtNBQkUde69aMskDOzAtCzmSpGjDhAmOpzEeZkRhziRoiVe2I5

pnnWxVTvThJyRhcKHGZRTP8OeA2aDMytwCbCYBKtEZ
EwPOGrRZgNBcrHybYWimulkgtVnJrgPsReanAAl8EKpkA8Sllb1XEHL2O2pZancCOU+fJ2w2ZQiDmL+M

9C8BHPh2tZYXpM80/ZQcYxP01zgisGdXgPmsiGbgWGsJmRov5qixhApGNxQPNtRbYBTEIEiYzWsw3qpA

upr2gxDzG+uUfGdKkmOU4KuSw01uuViBaOEqQOSZzC
zs5X9JZDI6E7yiUxcjXUmCErXpo4RyPDrNZsOmvOuhiQmb1wsYwKVIDaouoZhxLx4ER+eJmfPEXNgGhR

TLmUJcdXZtEgsxDC07XbFhIEdB/oXEsfYCK6QygiNDEQDcgvUgh6m2EKnr0zvFoHwQK6zj7N0iTkjMOM

M3ia6v0artZVszNumGO0m6jSIjdh7GjKOTxAV6umSA
Q6cbpwj5C1f4gJaMdNy2oLLdNKPjJZJRi+TrWidlFYGBC9Anl38k2Yg+3K9y9+U+Nr1jd2Qaxfe0Ar97

YEa18PZ05oIm1tjmc2yxlJe1j/Ti/VPO5FPoyXFp/Nfs5mIlsqK2tjezgHW3ru0Z4KLltvzlr7uc/c3t

81L4Rzk216h/zkueBf6kL5qlYg1hnk++guzzjLIt5v
JkRk41W1lN13o5gdvzTutLlLD8eFfbpNd6K3ewl4EqRgvcA0+kKu6iJb2y1JT1YXdx7+CH/fl0O/10+m

o/gU6mlGT3pym24gefq0fdc6htt/P1SAt1wN3oxm5qTu17iY2q7Hx804/4qOJG087SD+2Pajw68gyHlt

wxw7m3k+UfwQJ0bGmq270Rb6+XC9hA361z0X84XnpP
E1aVokppUd/94vLV/eKwpJA3wka40Nw8q0Hpd3tC/7d3A4mI7cm+9CekJoKvY1DQWMlx2h/+5uO7+9+m

X/+anqr73hXoN2ue5rw4J0r6c1kqcim6Xq93DQ69n7oVA/NPHukEMk1N/GwMYV7kx7z7gbHstKo+9+7p

qxhszYGIGXuB1zozEC9PUurrM0on/0PhOn/YCuevd1
7SvCidZlfold7FYV9oFUkrkCdDDAFvf63/HptN0lGSc7HyANC53/vHrECXKx5t82tZ6IfbQDAg8z5Tbc

DzL66/usAd7w32O6+zMh98pu+c5zCL4ran0qN9/NPr/47fEBds0dtp1KO87/7P+4//sMf4mgv2usztPt

M7tkZj735OywfeLZ/0mCb4aVx0l6jxS78+cc9JwXE/
4e778cO8kc+/B4by4/B49GM8qEN5GsQcnfqAa8RhWcYTZa/usKsWb3W9azvisks0967/7z/9s/dvDy7u

nnz/8vLJqoj+5YvjDf/53HqoR72qP0U50+/v4p61yBMS5bL8k21umOpp1/9nFQjbabsu7j2U4ugcG8q2

SY/6+YfXJ+e253uWjZzRLeM+9R/v37w+/zUP2lx4K1
etIild9+rBU3w05SQMKYR8v8kSxPz2/23nexQk31q/dnObrfHyc8h88af7Whu125xrIpQkMTSM30V62X

K8WEq0ZjuEi7Nxg0wHn5acx1FIJGI8cp40/g2N7nmcQpozkaCmhCQkTP1NDO1mq3XtHpT4OXXho1CoRE

ugQVt0sFYuYGmsqxEyb9WlhygkR7Nvf6LuKbQzWGUg
YsIxCbh4CLNdIzM8pOUmZzYvGICxO5WnDWNcMdQ7KzXnFNTARO8SWAZrrpLyYiVhNZZ+QdOsMF1nwhmx

WRMyt2YtCXxuSVieXOQqS8Z7pEbfHOQeA3UgnN31IFBuB6HndeC7EEH7WNGcSbBxDABuqXUhLMMxARO+

GnGxAS1jkmmhTYSnp3QxPSndPCU7dJ2bAOcAFTIQIG
oEGEkbYhV9d60ksiBYGWDbLWLtUO6OtpSahIdhthUewJPsQUX6BgYgMGQvOXJbLJInOQBHNVOpMMFyZB

QrZIXfE2CqjXchYWoMQPVKGYuJGRseZeNne9Y1NQXdjuWTPTDVXGDQJLMHBQXQTBTSOIOkQSApLEV3LV

OhGR3VsFMgMZO5ORmRMGWZJCcOHYiqBwDer0X6MUYd
L6TwGKNftcMyFQACROdnNnekCF2edIrdbetbW4KwtVQfJDJRUQXfFSIoVZMqNLQwW4Lup8H9H8VgCKjX

LMIIHSeEPAeaCoG8u44bcvGPFOPvZKXqQQ0+CR2xi9WqVv3QBJNtOB4sbgz3MI5WgWZmVQ0LVYexmaBf

C5dboqExXuFrYHFYZO1yC3QdzV57HQY+BhSvPQ3jen
q6rwOkKqJjBCCyBAVjIHFzIJfdBOJrUQYuTKLrLWZ6PCX1FODwOmZuQCRpBEZ6ZNUhUJTkLWKcabRMFV

PqZTO2NhZ3RQWqHRFuNGRfCQniZKQvDRQeKlD2HKWvFGAbUF8wFvHfXHKvRATnOXLlPkR8PnPcDhKTVM

XtQYCsSWZxOeDyIGDkQLKtEFnjOQCaSEJxYGo4WIQz
ROZdAV5gRzWlMXQgNUJkKBFtJVIwFNNdzkDJZRSiWCKjLCF1NTZqFJAzEFPhBDibFYVxJGXfGYP4XESs

OXUqSM6rIuLzLVMdQVJ1WmJbYYSzSHOkngVEMELwYQJaLRA8KBCkSSBuKLHoDDotCSJcIDOeLnV8JFKl

LJCmPO9oVfMbHKNeMFS7ENQrUBTiFYXlewQGBEAzVZ
SvVQr4GrEcAUDdVEIfBJovOSVoEPKpMNxfEIJtCOQtOR8jNiOiMPQxUVMtCESeHTYyQIKsrtQTQBMtXF

BeBUP8HkFvBRBkSXRhLUauBMCgJVNwHCI2DVHcASCnBU6rZtFvTEZeVHHgXrEiOZEfXRRvkgLWBDAhTM

NvPJWjLRNwGFOoFJWbMBakPUReNJVoVjJ2ZHJdSBAc
XD1tFqFxBPHtKIR0RRPgOZJtPKIwrkFUEFFxUGUtFPWjFLW1TAXsIIFyJEf8pwEjvEHkQul8It7AsHvo

NRB8Cn2ZveNrBDMuDJGUZj0Uo778THIzRPHEEei+OiizaLWvxMapUXQGMRC5DdLHMVWKT6O=









                    ID                  Date                Data Source

 

                    [id number]           10/05/2020 09:37:44 AM EDT [facility]









          Name      Value     Range     Interpretation Code Description Data Hannah

rce(s) Supporting 

Document(s)

 

          &PDF                                              [facility] 

LWTDFo6jMrNCZpZh20/BSXqgUANxe2XrKGjtRFd8RJmsRXKmB5OmrTjmTCXGHJTBW7vvBUTXM9HWPBMg

hdG
0rSBFlmNTxD5zucZEsdzRTl4Sys8EreUhqqywQVxDoEt2GTnEmJM4sre0YGUDmFB1zsi7ZEEL5AP7NsJ

q2ONZhA6HgDARjHUIfl2JiYD8XPL1kbTijVxk3PT7+NIiiOAN1ovPncK2JWWPbKbtsV0qgWrY+QwP7sD

JvvJ9HPYRRaQbOqhC2xWSIoGBRyTzhsPsbx4YT+et3
ZxTG60sssW3Ueum7ATwtMIlSULdWD8wP/35hHc/hnLPsZ/vRCyQbLdi/muG5sYywn+0+afj5jx3E3xTJ

n7qpBnBXYZn6aPbkWOJ0OsF8rNTjWvbFDecIOu5krNqj698bGWdXhkP1cTg29Qy2Rp8y2XB2HGQBRX1p

QmdYUnBYgEoXYCcHXVFADECviqmCF+MjLtHXWNX7IR
ZjBBcjCgPXYrlLDdrsqCOKYNykWzSFo4XPYj0Jm7/g5PrHaM6j/4cQyd28h0vZ47dAm+dvP/YG/Q+ve5

es1++zNUiop1jhyK9GnrjUB5KSlTDSieLMnpayKHVDnReXTzo3JuaSYapQJQLS6MSP1tQIkR7B+Y6f6k

24pgPBg4XociW0GhBO7pRm/Lv15BsdQoNmYxqm/QBN
PslBTxkR+oSyg1FlNv4kxDrjDWEdOEOCit9BVPJxeJ2HPGO9giKf2IUnhIaLWCi5eWDBNcaQt7hq1W6e

EIMFFy6TOdoOM3+QlpZSzXcgn9YF8qAHpWi9XRz3PsZIT/9Vq2t7N5UnxLCEEP5QMmWScmHujkp0tiir

7dI3jU0jf2DhoRKWd7BHUIOvieRlcV3zt+0oHvgc/a
jVmmvW4cBBRigle8ZTns7O7ueSZEi8roF1mxK8WWyutakZWdsfURQPDdaV1/kFtChzqF+gw7H8nMhR4Q

mXCx4y6xmjBDPJgsFRjtOf0CLGcAr7dUBDZPqWEJgNe9B0VQgAIBWQMf6kfORv6b/R3TCKU3G5PhdRf2

qiuXSUL/IC19my3IZdS4zT0c/2fDgOp7Q+jBbhjSUl
4jhXjc63E+m17Ka4grDyT7VcolBkhMpIFiB5ILd4fnk9FXm1wn/k4SGy9MCC0n8rELaoyJUeRkyFTqvz

naQebTfMZ+fpDIBY8xeXAvuvVp48ejWNo7RHZ72C81glFi16TNokUatBKeUGZSnj1EYbtKNbR+U2JYyf

p2PcMW1mI5t5VLwngU2RvszgD+70vdnX4dwfTqRzNH
HsPUpCKEXrCo8CNd5BMLawFZ3u3wV1IheNYZ7E8gEnZ5NRIh4FwppMtvuQL6MeaYocRYWJivFNIPp5Uv

jmRb4iaJSSacLJ0uozdrVNdxsuW4JLLSinrY09oTnQDHWHSBW4lbPmdzuHz7rSXal+r0FVDyC2WTnipv

BU+I6dABHMZqr9TrA0kcYRTLy4XNU7mWgm6BIukk2A
jyutDitFfOGI/YHwWLL1Hr2I4/T3gUYVrBAFamh3XsKUWzlRJ5wh20EgwtIX94gIYgTDlbs1gDFcWVP/

wbBNV4RMTvE0EVJYiAMVFlIK7TleJlQBkPJXlqw1YSrxvXUJ/JVkfUXAE6uHFmqhQPmQcVi+9ZBIltK7

YyDKmbszI76PxINSL2kguLyUFI6hltjFfO52h1SbTb
2FLkqM7djmbWAKvD5ab9PKR5tufKQu5VRWFSBnQqJVW9Gr58NSdCHIdbNE9G/EmjbjIXoMNxw2NGODb3

/RUSVyWbdtKTNJpr1YNX6GFmAAeJkEuwDdlEXtPdNI0G33beuy2xUlI02PCCVISh3Pe+mZ9fwmjuSpAx

dwLwAt5ycDFTLeD75P6G+vvQONHnbITKFSZA4HY+5R
PBLoGUUvMNt8QKXTLJINM+UOmcIs77VcU9C9v7ofQvdNirY1+lej+xtwdGILyqynDpdDnU9cFWtNUoYt

4Zb6lXd4DMayP0/7tIkJP5NRDQhdsnHsqZnGf+vUFVSpiJII+MyVLlBUBQ9nRgc/akHFYGPgzKvDqL9Z

ug0y+QvYowfJpLoFqU9vvnvSGSvVDSwwYvG3njjjO1
JmptBOfikwnq+wzP2Mr8Zurj6KFvwdYhP5HdhJBEksBWcqNSwEHvJCEsNmJimQ+H3bQRnUuwG4mRh6ku

OZ2Z6BiYiitzN6SnFNcLk3TgUSTopRUerNB20Hl5kNybaV5L9zpOYpdoKaH22+Ucmo+4/pDxUeB7MQkd

PQ/Bd6eWGlJh7QMpLufZ6KE9gsKnnJC/ZqbnoGf2H5
vnTQxC2a8qFM6dwHltqjIi5nbKp5gOmMBZRd2vK/tKIr04nJgfglUT8XwTk6d4SPKyL6EBwqTmW8Yakf

SpIyDWu66XRrnxH2NJc4btm94xfAlJavhcR4tl4mPfxRLIOJI8d6kxOgM7XE0XE2lFe+ECVUfEFzMq9O

DZOdkoOBZ2GA1SGhdqfwwNJETBhkSLBvAFCBQEXPCJ
BGFv6SS77BULweYq9rC2YzCRS7YmNDZjzbEn1F1EvFguR/wQLidB7xGJBb+RDI6QKRanoc4PJEDjtyD1

T8YI5PiXFPlKTIVhWWs484ECvub/EE73xYwW0vMYvEoqoZ9c/CyTxYqOCZgS2uGRj/s6j4kWXeAFEWPX

MyDpmP7MhgziBgT7pt089ojRp1mkCKnilx6HMuAZOr
Z60Sj6KcPxIR2iwxaW4UPIyzMXA7qUj8uM3TO4NnO2nf14As6iBZnqFjhUKU8biK+O026o0pqp36N+zW

Y7957GQMmfqcRwJ77PHzmzqoMKAjgv/+AZkw03Jr6oaW4xx8RdvLsp5UotwdgYPULKsFq/Lr3Q6JC+Xf

Jjtfjuej/AtxVr+gL/abpZxDdd5JJE2cvfiEYlk8P3
3YdZwqccY+YKI6kyJ2niSrEy0O/KR/kxD3RY5cZDRzpKDZ777a7qXw7+x79ypkWO96PY+ipeK3jBjlxJ

SGmsT48Jgc/FG7IGve/fVqDgq5V1dYFLO/9lckO8WitWt83fBYuOWKpt0J4r4gAWkPudtubM9XqocaGj

/vxMWYgznIk6qAQGE0PA/rfcpQJhct0PxCjnxVjdDm
CXmU+boc/5W3uKW8y1keUotkVcSPIFM9bWJpv3fM0ecTbtllRBbNGj7BGYsOA/rI0Q92dOjKbm1DodO4

HSHllK/Vj6yumXXIPURCtuqdf1v6LFqpXuU7D04L7cq+bpqkOP38scAqq/veB+ppN/7xVkW4ZV+jcJy9

I/L6cuxnh2b00gpCgH5q5SvQHqltmG8tS1Xd3dcIlx
bldEwHRzfY2uh3RALWu4yfAlGVdlVLRhh68ip0BiCNcjm+e89cumGutTreEjEtd6g4LVrJddYUwu1359

nDwEQAyesY26HrQZqYb72p0J9MoHaE76q5T6SXd+sk+6+Fs2T44912ra710M/7B2qtdHuTQp4E7kfBAN

CZtmqk/S0eRUZeuuZJcM8Y4ZqckNPEMIQNRNBuClg1
Uw0TQHUN8Lg+nAkVtSib+kUEIMbFTowOYyX94nDNduTRTPs1J9HZsTnT/EXNMcLVk1W2KqRliv6fT9+f

Jhoy1HRzOQnp2EZgGGUyp5GG9HmK13GV1WEarR4+L3BPeCUEvJyEAyrtOUPCWDLZPnSld2rY6cLzamZx

7TvdY7nwkgf7KX+z4G7+ddpt1A+mtpKxWDOPI3d0ao
Gd0w6yo+tM4JV1l8ud/MGvaDceMy6/Z2nB/vh330OmH9u1OXjhF/LtzWCS3dtTHSirteoMi0BHjMZm1l

OCRj4B41YVsnGgdHeksftN5AGasCnJQep/a8nKNtbWjlid55RSkCnKmvFwkh3ZYcw9guv1skCy0A8GC/

7YI6iFUQ3AKE0az5YzCCRdSFcnujUgVtzSIyDaOIPj
YvnYEcDbXGuABgUvGGTdBNeiSN1ILTxqHJomPGEhO4AegcKdsLFiMXOmHc8HIHJtLL4QHIZikDRjFJNs

UuSxZWTLNnLyTUYmYBPxzVPLy1waXvOpCPP1YYIdCamgSS1CYTVeJZ7Vx216IN70khI4BBHpPv2PTKUp

VJ4Xxh36rZF8DXBuChCgWYJxohFmQUZyoeZ6OQ7MUc
JuMJZ0cRFkHswYVY8LOTVxnDWpRR0FBPFnqIFkWE5+DQogID4+DQplbmRvYmoNCjYgMCBvYmoNCiAgPD

ifAgnpwNFwNZ2ZkPF9MHShP10tTILhFYExM6LiYUQ1Byh+Iw1FZYSnrIBlDO7SAldJ9NywyaV0MX/vTP

7FKrOYbBHgJ6svhONKMQb1LaO1R3BAYH9ru4ygUyef
/rq9j1ql9+Zyg7ZhOJdpvVl5kyHI8i+VLIT9+VvcTyIppah/1x+UCNqLFYf7h4xPR/M90cIkl5DKuDdd

PedLXxL60IYGTzE4jlBRRZqVDxMyABG5PJ0RdHHdQ6YcLYy3Vc1J9J9OPURMjePSIXcSNvH8BHaYeupg

jXVaWy1s4UyCSpCfdPaDRIXEMFsxIULqQRQ6Ih4ZS7
7ww70KIDuAbVMRvs19zt/s0n2SptnMu1rNaOsHEmpOJYQ1RSsQ+LJhHdwQuzQYcF31GHqei0VuOnjRV0

jnVgbY9PwFMDLwKkk5Tsss4pOMuqxjFhv2itKKLIEulonCW0gC3klEvKk4SXsedeFYwNqBCNTU0L3KHc

FvPFmvcYUBCHnJ9n0eduQlHLuFjdrmGB+GBtlKSuSS
HlTsLrAQYyBVqPL4FldcZ9hDtl2fXEVNlCaF1o7EaACS+1Vw3y5roGVdNJFTlXFYvGFxbSslcMRx6DUg

NIeiwHKjMmZLkQlDXaYLoQFTrJQdqBkuXqwLrU+ffO32R/X1KkSZhBwP3HjpfZW+10RL8xd0weyf49YP

ugApDMqL1z5uXzIx/pDcibh91s5IXkMiPCpqUn6CDJ
XgRbUx8dLQw8rPz81HIc3b2a577wGcb3wDXTCuyFPYJXNobSU6bVF4K+U7KxfbULjDqqEB7YbQwepdg0

yIzChJ64MqWgTAAo+OYUdPHuHjJfyhxPDvsqCZLy1IuVsRa8x2N1BmseX67fXpl7ZUOl8x/i8rEcXyb3

4kerYvLGVF4pUM/QkGLaiMVU0w9cfTQG/7X5S+MN/M
+EP1cQqvkomL2m3xCMABhkFIthbaW0Y58D0zcqf0Phy225EPbLKGG4YmJzXZ5g7EelFliIoHRkhBE5x2

Skp63Xb4LqOZBPl0WOPcTIAYK79tBfzQRINicz2aFwNnMkEUipKfaRAvRNGKDvQ5Gz+gUVJMLDvNIQg9

1D052u2VHvXJynT6li767JXRynvpWv0dbG4XP7ZRAZ
th4F1p3D3hDFDTQh/X8EZVVTQhEDU9XXHLF5KuW1n0ijhjyQyh/y/qs4L0dhaW5nENZ0YDDwWziPB1aS

PjLIlVJ2Pbl313/TMWxafDW2Qo9oLBd80+nM/V28WVJJrILKcF/eSMzZWw24U+AztNthVuQZpkmXw3aU

kAl5KuVhz9IwvomsNn2HmXTscvYP1eSUULrzAuhewR
TCtjeoIR2EOfkcLei+Ky3BsXbLCmkvUav2maEtf6bvIwwCnSa1zn2OQ3U1baFqLf861iABKcRgPH6fcS

K4gvpyPijPplAoCoe/UBuH75n5pOLXX9AqK0PckeE3/1ek5yo43/bZXw2pXJ8PlLFv7iimExGRsqrVEJ

AHQyBKSr6pNKE0qVdLbCnhzJkaYPOt36fUHgTIl65u
8r7mzyPhEltbVuEBH+wrrlVIhxA7OJS0HMQlSKfRloXH4RpTvqoZbvJTgBHznHfCcCN2NL8X8ixXdSD3

qCu/uO99jxvTqhKQWc23ObsGFk0RFteOiWpqoofK+R5b5JwjcJoblW0e7zn98e+Sh/IN5MJ+okDg56qA

LJJrtZxP1s7+IdjG68oSvGwDg/FZGDaIdSRgKDviHy
dAhkl7kuxkd0MkzuSGwvUnuNIM+BIBVteI48bRlwW9CVovYpsXVGFMwjQ/Sb1gxF4PLZ8Xc0hv8Uiqzm

vKVuoarXffUHDON6yAn+8704M+7v24+xR1/nZoP4YsgxUTlmnkox4j9S8/s2X5fz/ocwmauA03IMVxU8

t0aVMuKTBhU/DkLfj/q408nD+ph+p+RO4jSOGYoV1G
mKeq3v3eJPab1yk7UAubMEtwGUwt0/ZOapGdjpBBEthaEEf0TzmFys+Ya3CGsczuLXzN/KvsTON8i9td

CuqganVx/K+l/HegOdgM63Qt+Pn+z/yzE9BMTi4NqntZ1LZVBeZejYfp09I98AjOHbOCf+xYeGARNwxu

4bakQsTaOYWGMLOxPmlphPMNBFMELXCW+3U+i8gemW
8a1EP+wvOWXdnFK22nOQ0iYvoyoPzbBrJ4efnODkSUkoYcE2erzil+PEtQVCtG39S0urgX3fVeNce7Yy

EY3BiznOx9f39RxoExGmDJZ1rkkx+xIPHrlQMdMpT4tocAAQKI18Rq8YudnRVDV9Aincjs2hlt/V3BfX

W6QYoVuOyxVAeAWgPHTJWMh4oHzzJ7EZAqDDjWtwv3
zSfit1Hmi9OL5HM4kcxjDkmeudsw2tHLufL7CIupzz03G7ziycxQrjUiQTjvbRS3FeDuUew1QLBqd25U

u1o5sEB2ZEFGsHciva8Tavr29cfxHwTDRr6VHn4VpJBjtK1p1DXLzxz1476hmWO+1S7DX5+B/HrRD4QH

raukIhqNNgFM6WKwQdES8hmb1NHlSjVW2syw5QTOL0
TK9TCLRrGY0TsPIvV9XhP5KNQiFuORTcMXOhDL46IFShTTPSNPelGAQaN3Itm143yiJadtNpLIOeVs4U

EKLzNK0VLUQkYKVwsKHmBDTeAIHiDeV6MZIwSOlsNHShC4PpoqQjmhPbILXzFWLHMVzcAPKiS6itz1Ua

XAd8BB7CUV9MqjDyk9SeatNxD1fkO6LNYQ8GBZZbE5
NIY9WoL4ueBkPqx1MiK1mtRdSjq7LlCk0SUiClMs9LXdDmHE8nxn8PNEEgGC3csr2PGCB2RN6RgCj5MF

IgM2SiBXUyKNJjz0WyXW7YSY8qwUtvBsG5Pb7UCwZzo9ZiSRAdHPpQbn9SQI/bQBC9V+p/mCNIBe+uvb

SAQwYy2ZPNNmX40HLNr3TcLg1Zzs4c4siXfN25pNUl
rckh+xWyL7Rcb8tHYCqQI5dJxMWN5kcuPdvcUa8m/DnsbhgwGF13sADGe79/NmwvfChT7d7LX9k7uL+N

6NpLZo+2BhG0hUwXtZ8v9oTdY8oH808Bg/K7F4cStA+hKh3P1lbOkWoK05rei2+dHyTknUiqIqNkXIUo

ivOwOaI2pAOFEr0tZAbdQQ3Z7y1qGDNaLsfbOHeP5i
0RRnzsBGqzCWHu48xXlf2R7FNnGlOKsgPeaHaW9lPVLU8wkNxOuWQEr1lOFvxyMk8eWoaYX9MN4SlTv5

yj5SAJc8uxIFJ7UFNFK6IOYrxL1jLzdqaCGgOWxS1wFAJk6dnNO+50e5BkmocucFsPKuhfb4REoz29uw

v5Z08KoOOFOFvioBnkwHsuOmuHjiEEP+Udmp2apLDN
oXf7KPmgFcXp3QbJgJypsmyqKbaXQa9YE50uBK2skejCxVBj00GHA+QICYS59EKnjbl59+twYxly7LWV

X2D9SlAYcJ6/XSzMyvGRZkFkS0e+5+q/4+jpgXwKy91SOJOtHUSAdI+LMpu/QWU0mQX9nNBM4YHR50Ky

W7DBwPVJRAUBV3gekO2AFml3GwHd5h4u/SM7yHBmDD
LidXDUkyeT7gxoFHJUG5tXZ0YA3R3lN3X2C7lAOXNPYWTTII0BNAHV5DN2RYejzSSjioVfZi9NNdPrn5

7hkZ3tm/FZ/cYrrT1Mg9eRulC7jTc4Sf/j+b3soAmRotr9wJWLWBi/eWaqgafwtRizoikBlC3SCHLcaB

b4dsUq1dLwSQrp/AhCq/Ftt1vUB2UMuvKB++fdWZYi
DORq10/esQTOk3UC/qh7t1qPGch5HrOz5WklFisYtBISfVzQ+aMbgr2G6r+IbsBxZR378LFU2pmlYAs7

MM50/Yw4OTu99zY9Hht/DUsk5qsPHuIbSBG9uiDprV4HKG8ni7WyUFd5AFJut1NhLGftPAv0STsoIKKu

A0H6zRWuFVCuGD4HJWVxBM5AGCIrkmTnEnPpLPQKCp
XiOPFtEnAzb6WfJ8PdKSTdCMOJMXeqDSSlE18lDHbqQw41TAezQXGyFmNhONd0Kv8PHpZcDWTfH17jkO

OkrTLnNXKqMHLUEnCoKRXtN6NfgZFpKInzQ4VeT5NaPX4vfMIhAZ9gpIPoY4XtX1YxtqjcREBODqJjAS

BmYWxzZSAvSyBmYWxzZSA+Lc2HMST+Ab2NIT0qe4Kj
CUrfMZNlHL7jrx3OUYUyBEy5VXF4IIEuBde7BGO1SEYuTLUeMIF0RAO9BfH1QTgyLlQ9MJJ9VYUrEbYv

JcTaWRB0BJK0YMEzFld1URBuHuTvLyozOzw5VCE9CkF6TMHiERQ8ZDN8GoE8JVKbXQR9IDP1WxM0WUDo

BWS1ACS7CpWgOoquMxh0WEY1OSFZTmNrMXj0TOD5DL
Y0KLRnCBEsILC4LaliIbQ1JFmyWsN7AgIyEoB1YKMnVBP9PkgzRlYlMCT8MQB8TYHvDcN5BLT8RlW6Fe

AvXdBpMEo5RAJ1PqtsFkr4IYsiEmA6VqgeZyXyPQfpNjT5IiprXOW5LUP3PdM4VewjQjUbCW7GIOPqVk

fhWim7BKA9QYT4OlqbMMZ0UIYsJyZ5AGTdWTN0JVPx
MBE8DDHyBWF5VCY8POV7QJDvRWG8HJSoFbWyDwWnZXNsYZXqOjS0JbIkNUD1SEW2ZgL3ZNTeMSD1LMDe

OyF9HAWuXyz2UPR4GgO0DXPhLbTkOWDaUUAAQyAjEMZwTHUwNUFnVbWzOnHtKVTyPXH8ZYZzUaYtSWx5

TJT7RIHxWgOnWUy6DCG5DQOzFhNgMHf1FOM3FFSmLx
FuMNb5OSQ8MHIlLtNuPXh7KOJ3QPInRsHiVAm5ZLE0UKBnYbMeSQq2PWB1IIFjXzRjOIg0JPE8ILKfIZ

diJRt3GKY2YAUdVnBtAKx1LLW0YQTaQeKoTPf3QPD2ZKSfFhOlVIZ2EYHqEtKjWYR6DPI4DkF5TKVwJH

Q3GCK9YPF8IQKgExMiIFlbIcJiPlTnFTB9TKH5WZJn
NbPiYkG5JOC3BqJ1ELAoNJI3VSZgJlKkMrFsCbKsJE2RXBG8QlGiDZD5WKGgAsCzLnUbNeWoPQM0TCU5

UBXzPBH3CLykKRQ1QeZlVkWvAIO9WkU5LnrfMwC6BWF2MgB8YpgxRkU9XZHxKYNtVdTjNDM1KiA6Bkth

XxN2GQU8QvKfReusJjn7YME7VFXhNshyVtDeBBawBz
Z4XbbqRKclEMh8QCT3UfgrNtf9NIa9GCQ9CVRnVuj7GDykDhE9UaKaKxYbLMzzZgN2WhxeDuG6NLZfKK

O7CPEbVMA1YGK7RlO9RAUoWJB2PGX0IcV3OItzQHSxWUH3FlJ0BUEhWSC7EWQ6SbXwOjddZmg4STR2FR

RvEboiYQW8ZQ5JRKM9JFHwEMM6TJP3IgQ1GMUhZYN7
UHU0DiE2AWzsKjEhLLZ4JsF2BOAbHLB1JOR0EoY4VUUdKEQ2CDPbNJYlZO4KRP7lj8FaOMkbDYKyFK9x

xw0WXNH1AT3EJTBzBP9UjTGwL1KstqVYWSCyugrydK0aDVqfYMTlC9JphvEXSX2hD7PwzYGoMIynPHVk

N2BaN6SsmNL2ZKSeO9XkYADhP7z7HLxqZE8DLJUvSM
53ZH7vJKAHTgLfVCIwTxrlZ3CfSyWWCsHuVCXdXx2huAJKl5mzPzRjMBQwZuXeNCL7YKxjPP5AZlDsAN

XyWPJscMucPQ0odPKrBL2UbEXsQtFxHWcoDW4+UCaudsUsYgeORjCzSEJsi3YzSYcwJYj0NRueERZjR2

S6gLDkRj6nfJ3WlAP3zXZjU4NgiLGHkDWaF4Tjh0GZ
t038N9LnmVWtD0RcQ38upN3cI5ujyeTwg2pSgsNxMXdcTw3PLHMfAL6EcFDmdFKbPMEaKqDjOWDrqJCk

IWPjAyU3WWekQXVfT5xtNRLnndOpETKrDYNJGdGeUSEmJx7srWRso5KxnGJ0y3VyOAJuHPOVWSulTG3+

JObjmmCkYdpWMyZoALAre9SsTLufAKvjUqOsIJf3EA
LlJdxpIwNuOGM8FZC8TALiKLC7KVl5RYL7KbGxAlG7BFWcHuAeQsMgPko5GNR2SMVdVbawQdXwXPD5CH

WaViztUJE4EWT8WiQ4DOJgKDS5JYA3EuC7KYUcOJX7EXY8MaK5HCHnONA3HRZtDxDxZbDiEBe7NJ2VLU

L9YYQhVPf2AQDsQXR9WaWwLrCdZPalHwK9RiNdVgTl
PCQ6PhB9NAImHex5EDvbZsGwTtdmCIT6DRniWdB4RSAnODFkRCxdAwE3CojhQgR5HUk4NFF3XhTvXoW8

YQPhNGC1SsSwSfF5NLw1FJP1ZtdyVwP9POSpZFMVKhMzClHfEHG7HBPrSkTpZWy3MUL8GaMjZhPmDJI8

TFDnJZJ2PADzIdGyQPO5GrCwElVbEaPcCDPsCFNhUn
axUjw2WWH8SwPiQtatPOh7DWZgWXP4SYHpRySoRUBkZNEzTZakLVJ0RXJcDeC6QFDpRRJ0ABw7CUP3GN

CwTRlfRHI3CbH6MHUnOnt4WMC0HHEkUXyvEZo8FCc0ZHT0MQVgIsRdLJn3MUT1OUYmYfWcSJc1DVE0XB

LqRrFyGTv2ODK8OOZhUkMlPOf2DWH3EYStVzVjXYc3
GHG3YHDvNpLhAVt3RLU2AVBnCbHeWTz7LRU5CFGjOcPcPS7SGPV9DNEaGxJpPZs8ATL2CQRtWvXtASk0

LZM6JPEaMoDnNMn8KWXxEdvsOsOjWCU3QaB6UONuMSD0NMK4EpAqKQSbRQU2GGZwBwF1KsyeEnpmDBL3

JmN2AWYuBsMwVDfmFmJ9WEEfXKWbZWK4UKWvEsOtYy
FsGEMlXxHNBeTuSLb4MSI3PnGkOaOsSmIwNJVlBpSiPxNwHMO7MEntZDH1CqYrMTA5RNEsHGF6UwWyOv

HtUXprWmW5XsIvIgYrBTvtUpTiCDTqBQmxRvR9QoeoUeV4PNG2SlI9YjokLbi4RTO6QQSqRbwoSyc3MN

ptHgL3TkBsBdn1MN0GFOF0JiraOxg6XUt7PLO9Xdzj
DCx0HXz2XBX8OoRvXtEoNFlkGoQ9VtTrYfB7RAL5ZsT2CNGrZOF5YNR6HtE9RXBoASA0MYQ6KfJ4FVZu

CVj5VBB0YoG7SRVtDUE5SZX2VhL4JZHrVzo4KXY4TYFiAdhsXxs4SWObMBBBAiZvIlClMWRePLZ4WLDd

KgSdAATuGJX2PCZrKDN6FSEaQEY2QRVxNbUnNJOkGM
R0UHJkGES1QCSzXHJ0DWVdPVOYCkNrGN3fcd9QRLGhCVKuCaxEPkJnOVyERzVuUYLaLWfjSI4Oh746UJ

JaZ0VqdJUgxj3RBENaQL6Eb517LcLgOP9XetytsUzKo8dxYMiuYPFjX4VoF0HncRZ7WOMpN3WdRHZiC2

o0HDdvZH2XKVXjWL94ZR6lTTQRMkBrNHVbFrxbB6Tw
XmZVYrWoWNMjCl1nhZUPw8mtEdJvQKWaUqWqBMAdIUqbUJ6MGhXzXARtCMWygStlUU4bgCLyMC5AkYRu

ViAwDQogID4+PPlelzIuLfyJWvE8MOTxb8DiIQqnTPc3KYbxFNCoR4V0tNQkMo7ooA5NvLL9dUNeQ2Jx

yFSRpNLqE0Kug0LOj443N6QtqOKeOHJojNPyZJ0tb0
QbwupcA4jbQO0yeWRqA89zrE0qPMctXTHaW0HtdaW8Z7emrgKvVY3FSTP7C7dtgfJtHHQTKtRsMDGiF1

rplWotCLHgWJNhUs5JOTQdIZ3Sv808JAThJ7ThcFNfdlEuIBRqBMOXQsKuIk4RCxZrNQ0dev6DZWZcDA

JuAtwFQiYvHFoBAbFbOIGoTAyaGK8QQ4CjQZJ9N2U1
DtI3dITaNG5uR8JdApMwSI0xqRkxN5VrcTHLUGCHb37cb51djbAnAL6Zb4ehxbZgBHNjQ6QyxqorTPcc

WUqoA1moxFyxNLvcB3dbpOmppHAvBQIxLWUjK8YsNDG7TMJhTljcwARhSXakUakevIOTRKNiSLZiLDzh

ID4+QBbyPZC4qcMuwG3BZWIwbmcqGekhx9+pSo3gNw
SLpkZpUEgVWPUosBNNtvSlRUTbWxUXja8pVyZho1w1zKfNehaffpJQlHIserfm78c5g9097QYM6af+z+

rR522uVhx46/sbf0zfVk8LaMuihOMOODNCTBGIZma+O6TGNFw5xMfc2oyeB8x8i81YnhLxsh0xYyzt3Y

eOEw8RaLcxhnWnHyIYoTUnDqoZy5eDh2zwfugLdPqp
efQbbKvVdPn6NJTAW60j9JMvVH42tkNMPhALQkWXykOgCUxrJdSUWQTt6OR7QCQ/XCctMLbtyZOtIZMl

TEdu3BcOSV+hCk3S1NgG9o8ip8IxK7l2rcc+Nmogb+A6gete34OiRPuSvMSAmD4pVdfpPr3uscHoQFTv

5V9i2FT4pEk4KDLmlQrExk4EYR3cfmpCDWcEqDPdLR
Xm/hdZM+wx7zcuzQ6/frFLVK40WYg7kndihYfMaALU8sG+BOuUjn2OSBNpoN6JxhRWrQKaGVmwSrzHyp

pmEXMZvYgt/GyYDMfCyHB9ppfNx8iX+2R6Vka+L8j7oAx3uHXMH1ZkotqgpYLLsdWItFghq3ymRSFO4k

Gf87p4vPOkHnlw7RNOzYNGe/7I7w1YM7MsY8v6Nzfc
v7lgTxWqEyzvBFRdMrUyW2y1gBfWvQL+APrvsHy/bTFLRqQ4aRg8yv7u8Eu8t0PwjIOpg62nid8EzSDc

kGKJynM2WK6LrxJf7STVsI16cV06qy6pDHQQ/XUdYhKsNe3YBmRgAVzjDC4a3m1Wupb9AEFOvovYcU7V

flST26mU1sC6XMOd1CqpDtbziVfTmkYdk5i0OMteG+
JI42iGwR8ijz1c5/j8NSb1iCmeO2TVvUtYXQotPuQtuRCQ+CNGCjPHCjM/UKYnRxHnwPmKzDDXAqApqY

raHZA4LvEujSe1huczPtXmYpmtZY+TnD5KFHQY16PKxuNh60pe3JasbftXm7pZ+nuIfHrMV6Ysygy8X2

LFRQkdfGwXSiL7s1YxHfyDL227nC2K2Z8c7bKY6mEJ
au1lLg4yxl27aOjp/s1UGuKHx3D3VbUTvTEYDOhRvYQIQVY3PGynRFtG6pRMD/PMf2JjtbspcPFyRiWe

Ho5FgC7JzUIdyaZrn0e4wgpVE6uDBUZRIJ4O6U6eQhp5cBSnuxFI9tRkFnhkH0e/7xMB8V3OOPcdnbj4

0AfyzohO252nXE1MlX7N5BTiByMbkZaf8qEX5YmtkQ
0edNhJLLyrLWd6qf4dc0Eq90OlvUHrV1cgDV3AwTqr9DJO++qQzR/ezzfsJK9rZNwkzOzlPnz3P62Ha+

4QRb4mDYDuBuMFxlFdACKpelD6QQoQuU6VXW07Zoeqg0hAwepHwQY6F0VrlDAxxOLrNJOhkBIMDfXAC4

bGZ85Vv+gIIHvswZ+spmz2kXU7Wy3dsDKJIvLcSbI0
u93Wo5LXqUsTDlexLOpF0Z91rc/g90+GFZhJD+2SEox5kWFH+8J7+MbN7r68xGRGHFjMWRqsAYvPhsW1

WjX9iqZcL2qWDZYSv8FLEcpa9smUyEtzIgFtjAalAzWGIPIV4dob9z2vADf+tAOLPTzKCC3EPCqe8gkb

ipO6AM2K9CrI+umQQp6Grd6At8tO/6qDz5GpN7nAg7
NM6e8NUZNCaWwCDFOpGaGlmgIitFW8fkCf3c7UVHhaSUb68xw/Cz/qH9D0RaYdFPc9OwuFvhLST8IybR

PSUQiIQ5FZbCakIozi5erXV8/DyocHqQVIVLdonvPJrRBkm5jvlnWqBdpMThjzwl6bWDmHnbh9CBK8l1

mMQ5UJ13g2bzIpJS21qkXN6FXyCz3UzJLFteZV2y5+
ZrHIVuMlUHA4xyt4MvQPpH8a87uav5zJGLLtErkhaZihOL2CdRxTnEwY33iXFZnyY7N3vHjFWybdpAgK

wkJFN5jxGtqACa6ZsVuBDSP6siu5LaUxhPfFVtTQ+TIfh/uN6azw43HVTKCZrHECI5v9joRaj4iLPNl2

6jexw8JScXdt+CF8KkewWr+tjFCYjykCyesEp7OPJk
+DpFuXqxhfiIf44isczumExjyhCXBkcAq1vPoZohB3mHeIu6UWlh3vwguAoOgIv2xBajYkhTjhWJhzVS

c130iL2lpJUcXhQmGdcPfFI2xIPZzoNnmmJDbBav5JxvwFhX/FRirTaZ78dUAkZFGVeCyFAKyMthCod0

89sNBffo6b7eV3ZW9X8Zr3nR+uiuGx0p0r0bVJ4Jk8
uKdq3tTvgEw/U8Ske48b4eJz0UZHwmNbR6cT+fZrU7EFVehwH8Vg00iuKx3DPLI2Fa43QveMtigRb6EY

3kWB5lxwFk4mNhRY4Htp1oz4k+6hjRLiWbX7P1fWQXJAf/MYoihvu5AiqaMCaFpj/RGXksNCwVDTUbOc

G7C4JPlBj0bsQI9zdfMUh3oyliToqHKZRsLc6q4yi9
PGednOo4KwDgggb046cf7IP+aBQHhoW3l5vPulClQk9EZ3aq9cexD9JlZVNwkksz3CsDaLN9gDghZdEV

h1cQujftVXvELy/Iwqe0cdcPaXRgHyEteTY+OlaRfuMzvBSw7TrB8eubaZ0Q0V/8yLQEZami4YUuYEob

aNFrgkqx50otF24rUzRdcbJAi5la3X377FgV6+ymGZ
JtGPtr2c40ofx2vcKZXpwL8tIdHU2MaTfEil8c/XS0LtrSYa6H5Fh1oCpJKhWr5byiqZ02hJJ0xYigsz

StPY6BHad1JCTCc9mak7cHnMUAWS08RYx2Y1hc4/Zk/mpFvlfsOK/zFWnYCmvrg3U+ufme3r+rDMHEVe

gqfx0S0aUgbdU8jCvzd3+roK/XM8L0P+AXdtaRMWGd
GljNHcjIbH/Pr6rFTg5HnixFJUjvT1WwnJVmm7RJNmaXgmh5FSYCDYHYBK+tOvz19/LOR1ABpZd+Ue4C

P+7AHnuxMZZ7vu/SPKzWKvF0N0g0l+1OZmOvuHl+xv1VHi4H61VaPaZuevZ6Q/460U+vn7Tvq4KcgmCJ

JlFqCEgbmlUxOP6RuY/Pdc4S83aznuJzGx383FY0GI
IfjkVNYMIx3xsEa+Ur3LHRiu78K9AalVYq/cUWDeGvWu0zbL0CXb+DsX3JH19LDBjvujMe34Xy7aa+rs

Qk7H4KawWbyfH118BAYfbrUpdYj2jX8jOXttKqUvWW3iJEZp7HgHsOH3L8gUWRLUxvHAPO+gzwHw7lSe

KEYfEY3eOFRRmXrlQY5D8+TwaZpptgurlFTf7O3kZ6
sV9mkOv/OvNJN/1RWWfg7Cdh5aqoGQz9Fkc9yz8wFRJGpj0rzdsxGBCk7dRVQjeS/OKvdqf/wyNXj4L3

/E5jLVR1pal/PB2cAr9dkkOJua7toNqUU3y8CfUDdDM5F54iDiSkb7w18dqliAFyJ4c48lg5rWGKJZNE

+H2XbWGg9NMgqWH7dENgU87e8Dhxlq1RHAeFDcDB+P
llNBvsx4FGm9cEQr6kbQlfF9XQkwNoWzNodCx6f3GNaePjj5fJbSyXOD3Y3g0q6YhOjyNLWKeSJvYD7h

AxmEiTJANocK5lBSxBWX/fbxfViv9dO2TdJ/WtYVkw3siypyifnw4212iQu40+XfyzaQPrE2s2xZ77Gf

r7hfJtutbR6J51doQ7iyYj13I/YVZx6VDI2OcXUf9k
VH7+D5DmB/uG1Rqu7Cu034wm6DTH621uiH3CSijaduPsmrZkwc7MOZuyTtygAkYdBDY3t4MIdPAsZRwQ

xXzZW+itfZH0W+V1GIbj9x0REvSXPmDDW1C7asmk31fT+r77fXB7jXU3rlhWfSKxI3zROrwiyR62uEAG

bsSo3aX1hU0hsMN9dR7ppoq0IkQjsYOg0PeUzliJ99
8VHtFBM6oZlReQ43KHy4ayWLZGJB6D8NNlK6gyYxv0Ueotr/tWVw5An/uONnraHaSu+X8zHO2eKWODh1

CwXWbLMCbYCpxcyXCJBoU0YdIuass6uRih64FE8z0FbqcR4yhv14g96H3vW8KelR03LTKZ0GYfXMQ/1k

qwjcxHI42CRY06yIRbJQQDvk+6Ro1C88SQ1u58N6nw
VI8T7ZFeI82nIRapRyzi8eYCbua2ioatVdtkQEzsKhVK6K9DIcW07UkY9AQoxZdunPSAvkJ7H9G3W1i8

rvLa7Zgyq7p44X7sOJ0/cB41sjUP8Ypf/8BJIaG2/l+s+0KPhw3r721yLoN/rlH1F/Ljteyns4Nj68on

9C9/XHZuZ5ccqiL+QN3iF94/3jYNRzMLjsdkcFrixO
0VUndw5vZ0SFzHohRvPgfvjTaw5hN9ERoZwNwL/jKp6sJ0G2gPr1MHMdk8vKpU0/ZNU5ALHaxyP0ATmc

3a2cjjiz2deYwu1tq+eii/eLyBrcfAb5ErtbcB9gOFU/CzD1D+sOAedjUTuZuHSN6GX0xYGJMEyMUr1B

zuHy06Ip/DGt1P33N1xPNG4/IY48/0EgbyPdKKK33M
lRkpFuGI0bbr5ajiZ9+qQP6mqO0fmrPLDECvJCqEWyqILog4S79+Xy4TBY6txcPpx6atr/n7B9Z693+w

JmVDC0BVFSkTXQ6ySY9C4vc+/S0FsXuJPJIbSTEhcGlcDImQBLehqjdA6OWLnru8cJfGAa849Bhh4BrP

2sG+VI3DwyL/ifjVkcYc3cC0tYZyhn4Dzc7pXTUUsJ
v3xW1l2+KD7RqZW3KI3M5lqtUnQ6j46aLP+vVPEyHUv56z1LAg4M8POSF7er3iZ0acF6jzYd0uTand/9

d7v21VvxsoKdV9Byglo1Wp2WMP/uxJL1onS690lLJPXPDTGARPOMZuscKA86KoTumWrZMkXrZOA7geJj

hS0TPT2wp8YqUVvrMxDhAC4sze9PISiGWbBjK6P0rT
ReCT0daxSvgS3MhSM4mTRiX1hfAUylU58vwUJrnY3xSP2GaVTiZYQnN99leD8dDZ99KIliJ70wj0DZgC

YgFB5KKSJsL8LTI4TbL8uloMliOGxiE9oyxBvqiECtLKTzFHUtG7DbECI4FHntZmlspBLuBYxhXnxlwE

KNLPSnWSAwIOmcQZ1UWLWvfrHjIyKpONUeKWf+Pg0K
u1JqUQZyCGtTwf5dETytEPMEdVzpNtwPuoRqzCaqDvXElYYqwugOoQwxGAJ/zg4ql0LDYgdz0WHuBSbi

LkNjEH2mxoac5x3NqmCWfoKAofNOoaCUjvUMqlHMizDMeeEAjp3EIgW0vWp0q9iz09/lJR8JNZHiSqaF

jmy74PWEPEwYSSAzGnxVknp88NJar1z5v7zh8PeiHB
3n/BXpk0rU8V4FcmxJe+d1pa98x2u+REm+aPngJuHf9DaIVCLcM44TBPTVbfeHuItHIg+vXnv//GWbyI

c29ciGSudBgyQbVATxwvqOloyKBeTpFufHJxwLh/x65a5toBVhv4m4u2qiV8dDizB3DhvPL9Nobo4sFJ

d6WnVO9vBgSkLmV0dnILIhKFuix4j+oC2cPkQjUWUa
mGmwAYMN+AONpYAueSROkZljEyDA1pr/f/8eWUGq6crgFcMDuHKThu2eO7+IHfNKCyt57OCxqS9fjhCG

hX/Wfj1S7rScYc3SIYju2SNJ0SWL9PJLUOCCYgMAwCMBWCAE68T/2zEMAevo8PDVRce9pkeB5gw33GYM

d/Kb+t6GcI7bfcUhp2hLkWmJluGD/qUgqjxDLzqtHV
99zkc/nokV6dRI7CQ2W8I/iyEOmeYKJcchYdu66rpZlSFIpdZrzUVpwL9gIzYKvGLFGrR0pOW6QtNbS1

SqOrUySxajWLhbgkj4kraxIt+3w8krqqK4b5MBGFewH3ucrFFgy8lQ2+qli0Kz6ggTyxi7VJyLUpI9+U

pMrq4EWyqcRz/VLFlnyPvvu8ejenFRAQYGD9pppGTq
ovpZHYPL4KZMHkKzfBN8giOWW0aHcOY9hj0jw34+AihSziWNOKdhTPRQQXFC7WpEMsAQ3ILxBoJjtns2

475NURE9uHqriemzr2Gy6d6InYOPRO8kSwmKts/NCqWca4mZhMM1e9noUU2hL3mf4LpksUOjjTnkE28M

Ox9XnYLazrMMe/Whacxz7Z124SSTdFX2pZ4+h1DdIT
d8lUwhn6+FDJcduBfKHqBHsivX7lXYMflxHPfx1nHDp7k1TynkE83qxZF4IYb9SeOiYQB2kJZM3MjwYK

Xt+uIGbksN20XytO2wVW51mBY8pE7tPHCNBuXN+o403h76Z8hmKtw8R1ooMUZ8ROanHrPd9bzJD8/K9B

KASdzJOh2oRiny6XAbUN62wnfjdDR/xsVg00QtNpSe
5s0iKUjkkJtE3dgoLis1zzKSwjSvYujICW8XO2V2RAeeuw11hIT2ZBLSES7D/m8j76R3zTn8xLtZ+n4q

XRVAbYbSThPI21qmp+cQGSWUzLXvJITfSxWkdNRtyvZ5/n3nGJgnJfjFYB3XmlYd372Lgpg+RlenMV9X

4LxTdL6VMrFUAsfAXi3Ixiivm9w5pU081Lgns3tsgM
6cjOcgT3f47Hoh2wB2qoqTGQMKsjxzp+tz631czXSseFa+pWvEa9uROOA3WCGBPI9+FAc6dv48Kz2PMs

zgJq98UOpS0AWisSg3KtqNI0bhtKuXUFAAESReuNlsklolCSXemT64EUnyf6OiVjSgJx8OsFAqS/ZS5K

XIC/rkhxPMj/GCyEyRXy6ZBfnBKJdfJNIogHiPAW5y
PnupHSfuqmFCp5e0Ryk3dYtk9Zq499iu5wn2AwVpi6ueAKk8Azl+Ugc4Yvm2hHCXbhslTRBgUY+4j5p/

zI4x5748+Rp3ee4Gv23SsTMKlU5z+SAyWpVSldwad50zlyjXawxdZk/f45smHQ7yJ+vo/9kG7NE9Jfd2

bOjR+xl/Ga4CwUIYCUWCkronlge5NJFbaKv3hDFcy3
Ks4tmC92Oe45SK5m9Q7x4uGk+EaveisZCCX2hL5aYhVHBJkU9schzXDgl8kjyEheWBv8of2GUPQG58BP

KQwGv6k4knJf+LaUJXbwcPF+517bT481+BixIpbSFyS4kx0bCUd5/p1DJm9nYtYUz4DkhocSPg4hAeB5

1Ak4PdGlNv4gtMTs4Xp6nZvJ5WWOiTnTBa+18s0/2v
dfIn0KhcLtgFw+yetILQeEuEdvGqXSjuvsjGOw3wHXTRUzsywGeUB/VVyMl9PQBBMxuqqGmAA/CSwQa8

ZLABLxlswEt+LezAYEAXARRMCKRXAKFTMWQHHGXSQCHWRP8j/gqQkpIVIcIkWKA2pmYtsA2PGI1mh9Hb

KLpcZCUbKS7zre2DJChVUgKyV0D5yWGsVD4lasYurC
4QcWS4aRJsV4zoCTfvG29ywIHyrT9aMc6WrVUeTGCdP43qrS8cUV30FKysU00we0CFwAWlAB8CVWNsO9

JHvqR0J0aqBHHwSSA8Am0WPHgjrZKrQPIoN1hxzlh5xUHrSAq1IF1IzRu9ZMWzVs8RoIE6RMLiA40gRU

0NCj4+DPneaPEqAU2HKpbM4G5/eRRkvnP7GwWneaV2
0ZS1flgkbmRk0yStXnH06VDvijChBRLucdjxdUuKBFNdex9+A20HcyUDz8tJQL3al0gXLZ34WKndgURI

THASNThFk/NkTyulZaa6a9lqcdjULMaa7rEoihH7rWDQGAvqF/g141lj6N1+/9mSdr+7+93nx/dnJCIQ

GIVWuKXxHOwEQnPSUELABAUGVZYHKCLPr45h9EiL6c
w29acMdUXu1mEC4LKnsMkShIT+vSMcSKaeu0gdKKM3i4X+CcBi3qmnQj80x/PZCRhNvJv3wjf1DUcsQ5

Tsnxip9pI6wdSJQtimKl/Sdhi6MzELF0PTl/7EM3VK/wrriwslPUzP/tOzWhgR0r+qsGKm9dFLotQP7Q

gQe2mk9mbJCwAQdqVBVp175vf5MmV+Te7px9/9ns3f
yxRVB8U3jX+f3vpzj/98Lbg2X81/W9soI269+jDM4Zyc9hqSGl/MHd2bP/6FkKk2s5saCyzlubyKj+9T

D/Ef0x0RELvS61C7E/tclIUQn/FMnxZtSnTD+8Cj/AQy0hNVYhRb9P6bd9bE4nrD9vSHv+8VtE9v1IKs

v+Ky4MhMO2ZWwEEb89Du5RiDNkKISb0Vwjkeh+5ecG
Z8eRiPT0XRDmzY/PIo1MipR0iQwLbXjaht6ySMOdkXHC3+HOv6jKJYmy3vA0KcbBE7sy5iTemKe57I01

FVhtZu5NH1Bid9XRhhZ0486KMYzG0LXjDD7Gj0yLxzKyU28Wb2fWaemoPy4nitXNxUvyUnKSRxi98ZyE

CXLHEG82siJtIGneqJ5eVw8RoTDw0xO6I08gJYjsFE
Qu0f98gO5cWwtwev7sc06wrE2zMshn2n1MTxOXH4aEb9fNhei/A4QvGaAOS2Qfkrow/2N1n18HOrpu9X

N+7x6+nJ1TZjzGYQe7il6slUfztY14m98bTqeMBe3/4sIVEF0jBYkrMHLu3yDlNLH7+6dalhILbeeRfP

eEWoLEbgycftFHmZImLknARlGyQaZZVSj23ntNeZQv
qzBLpWq6qjXNId9h5bRMt/+Tn799227hrRB5lJEo21he/e+JOsQn5/yzg1odzfh8APnxq1xbqxNj4SUH

/JIh0aitFtjUELHfezXn1AACGRUAJrWkZhu/Ken4odvJVAe7Jnw+3vhuxZEZe8vfu4q8qWCSBqoxBwnB

Xzd7O+MAETHvZEnWbutu+9E/eSw4hZ014R5AHUoVCh
9wtSObkEhdzg1hoDi5TWTaTwAgXsrGxBEi2iIzHnzffznHy/6wsTMOE/c2WXQ2D2tg2/vO7aHNACA5R3

nM9ZmGF5ry9tfdBZhCADuaUcO8UEZmdzeVIF0k2P8zeeePXR2cPGFmH2mrW1MB/0TnNNultsXmP15LW8

rGGcr9ME+4x1wxa/ZO0F9aqq0mWKzH9YKHguOOgqP/
xm7LjWXrsjI8chFimPnjU9Ax1b17AGUzX8y7edMoadZMCh0YNpLrzRL4NZZAVA4xZvpICXszWx9tEjjy

PMmddRkX/LiZj47W+XmZeJRq1oNDpho/78+qzXugaWXkQOBySqAGQj334tv7b+UvT45T3e5tQa7tKGEy

LI2Ue+5J9LN37G1ZI5pmFykurAi1753e1MLc4U2RVZ
isELs9P5esCp8tH4HrGaq4T7afa/saHvwyDhFSi5IrSkhx8JWxLax3allYXjuyZRJ57aVV9a65QjHCjK

Y/BeD9XO6YsPf7sm4eJhEBjlN2VMgprWl1s/93Q5BzUQX4FqnJaSX42mwrNuYGfa96M1s447gBvMCNFC

70b5YnsmfP1mwe+46ckPlC3s/1Pp/Th1tQubkwA4w0
1vOQy37ZSF4cVPsaI6EsapNGmEKBFUzI3FSu3hMBM0YIWcLggV+ADm9lCFd+ooQswhjSWzm/gtn6jGX1

2gtP03ma7bu1vjduA4W0C4Lc8X1/rOXYzUwtig5aPUxVgFLR/OjujZAWIj+YKzvUjmnaEDjZot0gCxjA

TTtFnw9acDuxhEdvDxzbvxBe6bfzAOX3smqCWKo3q1
6hctvHK0Z3jzu+1ueuFbB3v0xCYv2CiWwSITWlfgZtm49iNxV/QaKlCPlzE01tsIQktiLT4qQgxDSXrq

Rh8dVD0Nwy+N62/cwNERib0yOJRqt4vLWeU/nRBTBc9XOZLFCV6QxFgDsNQFSvO5GuAcHNq1PnqlsmWN

xTG1eAFnqXTJoiN1bF+1rpjju+tKnwpBfu80dy4iw5
O+QK4g4Vcf7eoRuZB5lfe7IK5oSfsak4W8uw0dZw1Koce8otbbtzSkXb0lBuYuMRJZSiGvTxQyWUi7y8

/37qeete107qu5Ln33lpuF0z4/+ufC5LRbf/5ijE2RRHZNuRDof3soGZiUmxow0oKUqNbGhkD8U7/++l

qL5lbYckpYvLPytR0wNu3xNFZP2zF5py9LOqeUlkIi
oVH+ZaRQbo0RSFt/FG+bkK77hNrm0HV+yK5zdGgGCLlvfOUZNE95w6XeVhu4RvKh79ELU+HEZ0Y16DNo

n/uiRg/MTukZMtdFj2qRN+m5pfWdqgOFahREk0avuAFaQF2AI+Res1N+5Hr5n0o5SAgiRufYtt2uBVAp

cUT9v6bsRihsQmdsrYmhzbpcQruV6NFiLZkE34OmyK
O8+r1Ee3pm+1I1uwxK6/dXJjnEQ1ugCKZnmL/M65kNd/KmqoqmxoFrwbJY7uSkzn25T2L5xjq7r6kBQB

hSDReSyKIu2qegZUq9AuPji3PBbF5++f+yp10uheytl2FQJPeR626kCryy4APbEEz90kXga+Nv4bIRkp

KyboGBTkfqsy+RkunFwXfE0ZniviXoXx0Dztvn7Iqw
x6s8NrF/axJjo3KxciEEUZfS5Wwv09fq/jPNs+2p6m+It/n5PkF8wl58E2zaSddC1kKru+TJAksKA5d3

0VEP9cwYZirNo6C/W6oTHmu+QxNyJsL8P7n/YrRA1srm6S0r+S7vyk4eK7s66A45OCXQ4pLpuzJYhyl9

LXXFNDWYlqbbCUakBFZH5HLu2R66UKeWHX85cUb6cQ
IyA8URwr/YDicaxo+bwcataWMUDdSrYhOnvzf3ynh7reMxiTJLYYAhp6DnSc71nVsz/JBk/3B6xWWs94

HnGb6aea09nZLDltwYl2tXp+p6wwivwe++HBFc7j9qSAwhFrHq/7hGzQpN7OwrO+tH6kG+pPmlxGeMk5

gjw26ktfimllbXY2iP3Lwms4Hq6KRSl1YBFAgp2L43
bYQ3cM8K89QIVkzptPBS3lMosrYZ1ZHLA0JnSbgIIl1ybCO6dhCJwPa6HWKqMwZ/hCc6e8DhwEnFqqEX

KaOk3vd0sdG9Fu3oVhggeU6eV166Nvi9OcGKxxrvx5lxHokPJHJPM3m8AhY/Ct52siC496FvJ+su9SCr

an0GPQS9gf04LEdd/Z0f0M5C28M8tar6XUMT6MosL+
eMO5mlSXduq0EVtFEODuDi1uMvpbredcX3Zt/pVXEJNwSfmsozsDpNvdH5+uPzqEEvG43bC9va+X5CMR

VSXELMWbGJIrr+HgBnWfM2N6KxWRK6uSxcERD1VoNZZSEil9iCs85qwBN2Aj0bzgA9C3tToTzijpsQqd

Nt229lmavC+9FFWS0Oe81Y3aQ/ChqxopkaXdMuEE6z
Qk7BwL4cUdLQ/8EY2Pcsk9vzuFbM/2eCwIIvq4VKznkDleU5Wn46RwgRi0zp5ORgBZFTz9jpYYHPrmh2

l06K+7Ld8DvQpXZr1t/3INMVk5ex1W3x7QDoZBpACVKV6OsJpAhXt4bkvRNks8ArG1Tp1YiOUopKBqH6

28OQZ5pTCkdcLNokAOLu4hOujh438sYUb3q1thD5Rf
ho/IIenUO4mO/nvb8CbrMKMHczzepx5TbzCd71qdtsIR3S1rWdQG3mp9YZXX9S2JbtUgLC2OMuztDenA

2gztbuQPUCn/3Mwg1ahgYLgtF6mh2gCemknIy/82SePu3tat7P85jtRn88DQcRpr38m8Eth7an1Kt49/

or8mtJQzUUd1v8tvPrxxGpRY7jxYejK2qpWZEHfCkm
6KzFv//E3f/htaBMn362O6p1mfkEks66ST2xNtSrj0JxkUUgVIHIY0nLpFiYM7anWIZZ+156/mUk1WL5

wCJp9EWtvhCGjsuwgAp9ErhCpIAxGMzN6qS4YcxVB6OfIEJnwJA94mVPWMxBLXNdQkXaY8Q+zevTMRFT

NAPZBbIIXJksPgDs0uDhZ9g8JrOAlmZQx1Id9HwW4y
vqrTz0PlHJurVEEveUy4kVosJLrVK+XYgVL1YbYs2uxYFCfEcQflrFCM0OOo7ppZO8egk7pBXNyISGkE

gz9cUcmhU/yQvYtgdB3bRv/RxsL+wLSZupZlp4lPkfLxFmE283sCPC4FzeyKWl4B8CG+6tYYvsrWUpV1

+noVCGCXeDbf+jDvpNuHZYquyzP4rEcJPB96aGlBH5
xQRk8zuVWLL2C7acF1Zfz1xHTRtjY0WXta1A95iJeUDVzhclBUj7M3MjfwNGtRpW8p3mu3ulGxihjjS9

5vPVnrD1cXg9vd7n1PYsbgOocfCetFzAFe/KoFqzcgqz4PcRcw5a+USay5BmmKJvrpC21sD3JFgloK1E

MuVlCS630uyR5DBn6wCCkNtb5rGtBit7zRtmk21bTv
fZdz6uQdHsyF+xqE/HfPeT/73eMjt5Ft0m/h6YUylEgBgZKo30JCJzpI5xLjhYkNu8oEo1ai4M6E+3/5

BaQ2uJ8MFrOufKa8OX9l9kaYQzr7jnyEA0WSd1ei/EiQpVE+iIOYPjw5YnqV1Lat+ypU/I51ML1oBE5I

V2/6ZOsJmf0ZmQ0yviiJ0z7cN+rRZYHJxHnf2jSVcz
KGYLJEc46GbV6opC8sg4rfHZ+jYvqBwtLbPN21PIQibiTQ5pVdZwYLybHMnmjrWhdDNlnHoLnFlA4ibE

GbFbsFUC/klUKBZDON+4Nhg06G5rw80ARL+C66JY/+l4uuMktJifB0DULMZ9QFrA/xbRVpx0F+zkMFob

qrUDFQyPOdzc1b6BDl2ufDVJDHGBMOVRCYn5zSDnTY
1RFyILCq36W6JKyJQgQOEjNrgT3faRtCMS+QZIZKlQDj7bedrb7/ioOsUHqOTXZ0eKfXbUp42x+y1CSe

fOd1KwiN2RqvJcqZaSnIXTWjovqo3u9m6FDqh0EC+JDMEau+eqUjFWleznLtyitGv5bmfjNXgQRWAFq7

gMlklVqMCWcRfUkxNNm29bNe4NfFT4dIUhG4amcNOE
Ab3NI8ZRPOBal/YLhYpmoJkT71b7awazMgFundstkjk/ki0V6PfRAxRy8zUBdndcxaFcgHr6QY3icNmo

kL1b501uIZLW7NXKLIJVAy+aGPJzrsncCMwEuErfqFZULxRBor9VWdMwAVaVT+8NeG8JUYMzXdzcqQBk

CH3LxgBzQE3ND/I0kY9D7RiU/TztudXigld9yPQkU2
PBdjzw+FaG8suwUqTH1dfRBi30Tm0WbzrjPnHHx00T2FHPNdme0F3UHbztGUm9GEX/p7e216gIUb9hzm

p2KKZkrVC5RykUfvAONtT6fD9Jjx/KlQ6XfwKVR+/Um6Y6xmWYIXzZHeSRoIVlkRVX4GTsh0qblyRAV0

iaOooLV09tNA7ipv4EVVnAxUFRhTPLan0MjOyM9P9u
sOT9j2X+e0GQTH6YDwLI8XwEXe8zPYMbvBcP0AgP0eEDsxS8Zs5I6x0PnFVCRj4lMLoZKExrm5iNvTD2

Qa9d2Na7rBigbpqSj/xIlagsSLpt32ByjPcFBBjKv9924yoSbi3ayN+Xi3vSK+d6tjeyNIOE4mhcQCyl

HlvVFoeBVdia6+tCvBJyiCOh+gsbU86sXCVPkORWKL
rEaucBcssL06PtbGGnyj+URyf1zqDWc/10q64mKNbXdqPmpVo5IZhI4SG9EgPJyHhYYfIZ68NOowcBQN

Y3tgxn3Q2cxkDwU0hbPNrzKWgW601ZY8GnjvvWA/QIHGDmQxqhVyjjUDRV0yx7HEfyDuoPoqQ6ODH0aU

CNVn8VEDEHR04GwLIN6NLq9t8Xd+2PXEVeg2WejBFL
GudseIpEqpB4jtLqTqujul+KI4kqGBpRjJmBGJQqFPkFDhqdVNfYeltFtqjI9SQmq2jS20tuF6Efik8F

U1Df7IuXGFOnDK1FwI0RON1pWW1jdwir2pJMbgmpjHHDMrrurv/GZW4fv4lU7cFkLdwcgtg3ePDEdZjC

Pv791lC1TALmXzmZrTzd0LFimKSH+WTQNMqQXNA4Ay
jD164hj45MsBWx8BxFG/TRcZwdBzbkhOQQ7Ekts7a0NEr250h2HcYCmxBRrue6oxX6cd+DcU0HwKoQDX

Yrc+Ko2B0FSv9bS3F+IGGmFOAAzajmlePnBMLRCaY0sLw35WuC6S4tW0IgxoO0zJW5UYIM4YDw2i35ZC

/6iI60zqWqFguNRDPEygucArjOwtd1d8rexkDVqhIR
jqZGoQNUqEwGpBoYadZCafJtmwrVPJijZ+dMVtlj8zV8fmBKRH+pDOLLOKVg9jzzqf1lek3LEUGcDDJv

oRrZxmyvA5meZ0WdbeeaeZhBUIJ/iD+iRP/otLJ+sDpWcoOMmIurbLT0fbsXpG70HSb5MAvtJWZZLhst

hrWa6NvzvMJgAei0tNyZ4poCzMyOG8yClxGOEBW7dt
Y53K0Oqel9Ty05AwKyUfL3FrNCdpOvek5OS8CSkUuKQTmdBCBOKKXinRrg5gK5Gjq9mWHWhcPYm7iKiV

5hGo1UYyeNCsQrVZ6ZxwsyKBFpkutGcCrvTzcZn4vFKVXAtyoaOH8dj0E95DzUs5S/WV+hqgEdogjWJ6

9aA9TeaVf/2YvMz0A7k/Zi8qyqZc8HZMdY/ErTrGXS
GfJN3ipOQV7Oj919zde96uvsP2X7606QHOqjMZbEReeXzILHDsCRnSx1MuqhJIvj0Ex+9AFoL2HifLdw

pqfKyPUQhzgKHXatUl8zJ91UEN+qW8k4ewys6mxePonIZCJWBaPo7T/LUDDqloc9/ywSkDK5oLRpRGLO

Th9BaPLis5t4zhgDtcE0Pg0aeroP+fXJWD6ZtsF1iR
PgPgIbrIXqy4emRdlEXIQVNFeWJd9DqtvsXooIcUJz3jtWIrlVfZYvo2FNJoHgsKVDKL/3k0QbCuI7w5

09IJEVlbxnF97tvKcersThqn0otrkGlCzyY13NkneVQKdg1UBobL86sutu7AtiQ8YmrwmP5znwfg8oCL

hq1GLmPe+APfSYrOsISB5Y5Qk4zsOaeRuyOMHZf8aL
evxjAZUp8XsCw+uU0H93scIW8+d/DwMuCvJa3j2Y0EG7ieiLFIcTzaeBKXR4aBr0osaSht17+4wFXiLp

E3RRhShVuo1aNffmfeOGsMX7jylEONT+F3YYcoebs6d0JczM6DRR0zJ2JSNF6fxeAiBQTWYMRG6QX4+a

fHCMJpNkjRL485l2HXkqeuEiXSmy8VRQCrlH/hiigJ
n9zdAFaTvIYAv1FCeLtPgVz2St9POq4KpoQ+WI+x53tOSpLweXEnjfqADVI7hR6BZylD9aMALgXjro9W

JUqwPyJ+Aw1sPs5X94CrluDZq9WyVInoxt90lp+wbfD6X5w/qaJgGEVDwDVFjGSRkos1IpooPWUCwQYV

wyutp4mSZcOMIDhIl0IceLnWCCnZMiR1tbuny0tSIf
5nFY+9WByjIMml4Jk3ijFL+OKcuv9LbkSs0X5zY6pvp4tbEtPlVzaTlkykgmW0bNUdMiMaV6FA74DGib

aab+xooA3DmXZ/No1O6AQ3ryrH+mChKcLw1oAESb7qVx0lbj8liET6mYCCIRnoAMLw+nu8Y5IbUhYBMC

po9f7aqCg99rs48PQHMt3JKffAmMGBs2aSmLwwwmYe
9TJ0P+tieSOYp36kyBUSDZVmrxCeVXLQ2nZi8y6CY5ieioGVVHgN8f8PoBmO5/WfUsGG5KbAKpWVX+s8

PY5fvvJ5QuvWhX5IVxcomDsOq6cn8Ua+2yPNENz4n2gtHOyklagd9JhuxUvivjhZJs6HKAkpMHcx9Nkm

vbLJExx7yNd9mB82exBwVwih7svsHxMtD/t/EepoKi
J7AtEsK5Q+Obu9SNhwVRfusH2opNrnE6AItLfn9nMmCioMw4UC9UQ9mU4fsKs/H23uhWMoYlBrI0sVGr

2hvI9nbCp2VzpvNDWuPW7P7TIlpf2H+9ie4bmfrR4L2HYLieAKwxdPR3zZLKae1jxQl5FDbIheMepDNN

g+ajqlPY6qRM2SxRASRKVq1TiUNhoiuOGaWxTsAwba
u1Zj13LUW/3WK7WeQYhsO+BUZ3ps50M6a/tDE/mzMoMynJq1cjWXAzQaGdJOOXdJZQCPyagjeEhvigCk

8aXSfUANajujHl+QQvw6LlgNParqAN1N2JdiHIYTDzpfZQJB1pMlXIPOTFI/nIwS9ZTV4/FfuqLhteK5

FkqRUHn7W6b1XWEK+nanNtzQNtfGi3P8UfXZihfHiy
pR14893/sF0EBidyWqk0vzWcR8OC7l+4amjAyn2RQt0pI97/kOhcDCeNGxYwlTxrFxb0bUyx7xqfs8V0

tXC59bZDKRDS8sfxk+UDSEvTdJt7kw2TFcWqgaWV1SbZItkSZn/aHI7yuymSz6geF4+MKp1cWNgA+0Cz

o7q7caI9NkFiI1QcTFNVVVb9cDsiSF111f/onsjUsm
D0JBmPydGOA9EIwTFNLIY1DwzJCMKxcVmGFWCkMI6N4IoWIZ42ngX+MplrFM/THyDZRmUXJCjL9WG7rT

YBGJ+z5emLSo+ExPrGN2zadQDGFCMNPlSDcD+AElnP5LN65sIkvXugWOkJVu5ut4TMSDrjesFhRJWod0

xFYXeejwrJRyDMI5J7K3HvZxMLrQQCiilXY7IiUjRk
b4zBa6zNBeXC7WTsaarf6gN5GOwzjUZ2eXgnkRjE3tMzewiWRAVEY35485rfO8T0eREUAuLCeBFW2hId

qbts/AO/gdlzvCwRAY7pF7qXskoaQpT1lCtmC1nZAiA+oK7SCBmK2eH47i3qwpvgiAcK03k4RczZIR75

n4G7NBzl0mofFPrakw8Dm06oWO7jVEGhKuBbVXdun2
t+OnZgBaNtve/54zvHtm9E6KgP9g9h0qZxUEnFUfYZzgjjdkUa8HBYmqOHtwwyGFvLm9YVek59RqHYa4

SvL96Cjznfn5o3neJJYGPPGFaKIfw/7EEakyMEhdsx2C+i+JOon7bO12Q4W5oLe7Uu0ybPW2+kvTwVY7

QCKr0A8NIb/S9swAZ1WLJSJtHDuQCQAJz9ssYlKO5C
/4RDUcVbfl9YDSHsW9D8E6vI0UJ8pfeiU5ngtwEsTSRyD8t7ln9HboE67loHVJBDw3kuu4JObwQvwmZD

Aa0RIWcC6Sx5srSSbc1Yo9sfse+OwBMj/ECo0REi73q0TsLxZoiMvqwJSVBPR3iS3m0XV5fOtV5aBNhw

6U7p33OymIv68zGAq0eToB2LTf3xX92VLuMlCcW8/b
I4pWVONgmb5HPoMU5mRd5IDK9oT5/GDNODj88vxl0hSME33qN9JtDWBkQ7OKy3TMgRufQaYD2kb/umzH

YqgHja7sV5SK6tc9+/htKX86z7DeliiHdQ4mVPZ5r+eerKcoVOvKN/Bdj5V2EQiw6E8loV3pda22y6m3

Tq8hqDV4/+SmdymQ2OQ3CmrBjRRnDTTc9ZBEZzxRcB
xikhMjEPc7MmL1Ailju2wRduCca56YgBlXsWYL/bEUO7p9C34HD06sfUYUIsZ5jLHpMBFP73pbf7lisf

rOWwQCFE+zWUM4pkJkNs8VrqkkvTUq7y+bCK76W17DwK41sqCbMTV1lhHFBwlzBg07HeuF3KjWuO5m5X

6/8Q9EmXilICtEUobLpfwPBsmjqRv8C9T6FzMHi0HC
mwb2E/wld3nu4VtLIkf1DCYM/8kTPrBDxP+VWdLIxAlb6a3hnJS+nLzLig91nEKVgsNCHxwSVFB9F8rk

Ag9o27AzgHG7F2XSNJolHsCKMrcqOEPVraKiJCJWSDeyN+YIL8MHvu/CCpjb3M+zAvyuiz1TNfOFgQVp

IPEAgLE3kJKG3fJOqMoOY5+vspLsIB/MstJ3J3HXTO
aJrngiXRJ6iHa+GLqbjCFNoJnzIK1MpNSqcDd9oAF283QBQw3jofNR5HwrVX57d94NF1v/cmQ5uEADjB

Kdy1d2RnpDHB9rGuOAih/WGIZ5mwCBplVCypbRifs1EbA0ERUHGq9CjnKlZ+SvNySqKKKxR/5bJPyj/9

Gf1yMsxVH+farWGdJYfnSJlYuq4Rvhe4bv2zL6W1Ri
Nd/qdQLEZ36i/QAPl5wkEabaLIxXF8S2mwuICDt3bJ6hVlRMYPbQkyH5dkF+a8EnAv/AT34ZEH7S1IQR

92C/lx5v2n+aF6YrvHpA0SNW2V+akNwaoJUi+fH1NH9u6w4mXylYL/gP06CjukIjS8xUj1fQrkfJdn9D

S0eKESYNdqizmZlBiTkCUDrw0w7jYrIAWeQhMxdWUl
ZxoB3Jc1AibtVsd7WaOJR/1m6XIBOAcF6/5ELrndstDS181LDdny6yb7hSwqv3TYpouZ1hNn7r3yGkRu

2m6gHVmgvxZB2BrFdknGQ7I7keTAdOMNeDRvDjHCDEMrtK9cCUr+I9ziqXKG5sQooi+6mf4Sg5YuKvnN

i9mAvpRwXyXtbiYfbtSs4KfR7hw59ZahRqjE9ddJQ9
vyPkXhnX7oVeVhdHz1Ogs6I6H6sLiofb1/fSNP64pORf0y9Baz+sxl0h6JWG6azEmaQtz+xHt1Gtd36q

2e6YLSFbeI60bMtrauIT8k9UrbnsuOS9Z8rJFxE2w9w4xZJpKRYmywQ2ejiXA8OnNqe6Urk1/50T445k

Tiffanie+sLmbF+y69G7s5YsHH+LB2GxFQR0VzutgIABQWZ
X7ReYFhgvoeZuR37y3XVm0Ej2hX2M0LTySVYm7MpjDiZFhFjgWx6Cyla0eKITMSrReo6uYUdPO7ypvzB

qYmqqZdaHX66mC9ckbQ/psqYhoOhTdgam2L2yOtJgHmu2DsyzseuGRoTu5MwwZWrRwy8cOVMqHwuV0lH

LxsW5KgTFR8tCGRQ7uZrKJSQ+iP6M/Ppku2RxtcReu
iO9b+ZLajYBANLqZ4qWjUUmfsvjR65Dr88uvgpGrBWzuP+2swPMwq+r2NTNdcU+RCIGievgAFLGMX54j

mrZubWnkmSTtVL8+PMxFDLHAxzptwrFk1Vznvs6Ytcu5mr7vmy2bi+CC8j60bEwW4DkgrvElZo1vWGnY

3Fi2O7xTQIgTPjtTG4j9x6kTs3naunOMP7UZBl7Dx+
AegSRCA7ClnYWdZAhpJ4B3K4pF0stY0qs61X23eGfg61iRQtAdK4Khfj5mgn5drbzaornKyWtg/NLu/W

my0i1jclXu8D0td4BRaceQ/buWzzhfRxKMWFqqTCct1NvbjOSID+T9uEbul7duqiKk5S2jaCEfm9E39T

7E6m7iBRJxdguCPrpjctm9BM34zKvgi/zf4JJxB1nc
M0b1L6IdvRMlt2onrYBtPncHn2FNP6ea+6VkCR+BwLIREnk6c36Zp1gdKt8TQEnWX8NavlpGb1cfKwBN

o2IO+sM5NYqqIOtY6MxJ+7H2VXj4wVMvvsIaDi94RH280iOCS2hSKMHwB2RHqxIqcqSvZqxPMvFfJ9Y8

N7FLarukw5AEtsByENhIAHeiRWzuq8maSI1AChUeGN
AjyNP95cLdKsNu0BLCQjU/4sD8S1ZW+Fredrick/1QSrktGbQgVUY1HG64Wx/Z3MRoXl+2X0pIX4lI8IaNyoM

rvfOmuTXftDQMKU5yfcc9lXt3reyFUIXmd5KJSX5aenIoJZB6clar3mxKh3hyFM5m+WgiXuf1Zh7DJNA

ALm4Jaq8MIV3znt2cYuimdIV2yHMRrNpbGV8h6TRk9
I6HWTncKRwdqpyjyd7c+/bnq3ESM6GsxweO5x573yOyZxX0oCohZOc7f+CbXHxe4nDVZ1m4fV3GuLogO

cPFMbnI10t13ygVoLvpBPTvCDsmfYz7mEazOCl2j8Lxc4uJ4tgZ7GtgD84cO1wEkam+G87k1aMjvt4wN

KyrNzMWhw/8F8kZsPSr8PXkM5GmtSDo3EMisv/W/cT
N8V8tYVqU3U3bBMn4Hse3h1eELTwl4ku0JeHuCfi5+qFZUO6ybTAP1mp73x5vLSiKSx7TWwnAjo2TF+x

W+vJ58h944eL5k3UfrVA1ckLgxDHjCWaXCexVDeXxkEeAh89XMS71wk1YeTloCBbhqXGcFKVkY0UD8DA

i71j2JCythzLRwGdtGyxJiQfpbCQGa5pFg2iTyiXwj
so9EkAmsa11CbI5MmXG/tFh/QijRv5zM2LB7JPSwFl8SKLKyUvqJRo8/mMLihiBAoc32OomKdiso5Hfk

tOCPL1RXjjwylpwpZY/b2/yQrzkJXzLUsVKEikSzWYy2vxbz6uJ+Xp9SGLy/yLO0dQQFP050z01Oy+eF

b9Lx2xSA/CqiKDBmbxo32GWPJ8U3UE2dlBCjsK4vXV
xhFj4OkeUdH+sGcvSyo+AXLvZKFP1k3nyvVfjXj4t+++Ca0dlyB+nHe8pk6hobRB5QXBwqHKVzKsnKxj

8GR+dbti1s+1DTzKOBM9jnRgDdUalXYhiEFKbDPha5DOjftQ6jvH2r/hIAVS+TW1jAagz6sbcKEpjRSO

1+kCDIF57XhEYUTOeWs/sc9QJvdDBHuHlyYq6BKkUn
xOyrrLeWXKxOQFnA39EQZbFZ4WvOptWWYWr4MAJjsdOF33fCqSKd6mhZGI/wQ9UC7Ro0Jc9pZdkCI6m/

6hYVIVoAUjQk0wpUVtEh76g1EgtrTptjTFxLoLIEDma4LDSZYb4/Z1sgZhopWD8WXfNuGfqJ1IaOao2+

Ec8663Rh4cyfDQxeBUOIEGbjRNtgZpQj9P02vFHaTE
VD8a/7XDSy+jrGBtukVuFkru5Wx7qnNkeCEg5BGTJbhL5KhW/6zZwXF8aWYWUXDl+/BnhTWbwNO4LCc2

AOJKjCi3d90ZzgSyLcs6uhFgZLzGGWqAMPL9RJnz3TtP3dcaRew16+JZUNvbwuTUBEOJ7jecK37TeidE

ljUwfeCjUjH5PuL9MMEeT+kVX6y3Ew1rgFMZIXhv2Y
HQPs2uEhj7HXDb6JZK7R7grTfShc9862rigUhSn5aeHa8UBH0AmQ2WNm3hIWPZseS1SmGQH6Gy7N6Llm

CA9r+49UHbYlGfQC7k0cqMW+z5rPQmYn1WTFMZAtNIW3nir2SBvTwsD2YfUPsYAShw/z6XWasVMFq79x

YlnyRHUrjdIYPDr4VuGP/ES+iD+uZ1Fg/Rn1k/Xigf
pvVSNLgGdZyZU0kyLHmGefVxQhqP3nVKgwHTONSc2th+7EJMNlalpheeC2f4T+PK5KmrlpEeP+M9PY7m

OIjFlpZIBT/0a3iTxxTy+VJ1mLOzkyCFwMFAHu3Uv7IGqtNUfGGFZDKlmRalvB2tX8RECq3NvdLsM1Ge

l5gKRvvwqOppzNGDfbYHdpTfVS13YfQNqtftAmxrN+
OBL0gR2RUHedeTQwnnX+i4HWPQqWl42axcQ7ul5DZywLVVPBlXndRJyE0v/hBdwrUaXCmRxZdD2zPBiE

S+pZ+4TW+7tvJmK7+eQRMZg2JtI6/Xs6COtCCl92GKSEGhUQjEQv3j3V1+f7jagxRPJ6jo4QbSxVxW5n

wovmhNJYX5KfEMqHUr9zWIyjeQDwn0S12RQUPXVfYD
m29VhJ40qnt3cjbahU65sB2RBFKUtS6FUxXob29IQm5g/JhJrsZQjTA1vbvfOGjnSJvupU0qk4lq9vpO

qHUZtuy8oBq75v4Q1wPt3P+Bhmu2VjXQZhd4lyoJ0IaHUFh3M+ijZAHA10hkgJ6lh/zFbSpLJmc+PWU6

FAAspLlZn0Yoimv05weYcosB+c4ONdVtNh+tVrWHqV
2dPZ+0PeiAzKaTyMRkKeAT9TNuPB0dwwOXkGKBwSafLzXs3Kpz0ADoG5i1yOaUnpoH4BHb55hBMDiw6z

UMBVPO7KXSj/GrRFVnF6v28Kr+/em4Yrv60mjSwtxOME7kLjMhbjmrSF0k0Fwruyyt8b8pu6ocLs4aAN

BnkO9DXXTRs3PLzumfEeu0Yhi013g/N7GuwcjwCbvF
RGqhM/qKC1uryVFeh6SD296PcSzhZiMCqy6j96VUmy+qVnpML8t9reiSlRGqikQ3jru3d+YXWQvkAmO7

gcdLTaV9ruYUBEiwjZPG5XiyJTLpRpnr7iRQySacSE0vnLy0CWHa2mAtCc9H4Zh14lUBJbmnomQFVsiN

6VORtUgCH/BgKPFW9EmMjjWnV+9V5Mg1sgb9VZqqam
hkTbymJq8WvGw3KtXOxzAjXfjcEK8w38+KumNBl0bt9PK5Vl69h/+KtTAC/xP/4LzyNlaj050qVwcY0e

BHwR9eif7aAJBXaJLsDRdFPyPQTONNACRWJEJULDUFtJCR/j+BkqTfUJcciwQrnAWfAQ5XFaXhHE5lfb

4MPSmaIHAwPdiJPga2ICbmMP8CdQCeT7aUNnrnQ1Nn
U2NoqCayKU4JrTMlOU6GOD4gY4jvEeWoOrl1v4XaqbNxiSBwvzTlhPQ2C8QntI2lU2JdH7EvGHK1uPYj

JxfYW8vgVSSbSYG6Lf6XKHbfrMEbAMDzB7xgnad7dVA8ZlomK2AvyHQlxeYgZ9DeYXCjPBTmk0AxXW4N

HQKdE74sm8tpJBjjGDEMLD0ZXd5MPxB7ztDikH6LJW
Rg6Sq3SumIYPVtoLwJybDDGBcHkRDrjyIC7RHrM2CXrvGVAFvpKiKYsLWm6Rj40Sc5aODUmb1NQdJf1v

QULCVPAVFURCZKQFMSNQJAPDVI6B/dLjoLjXp/7sDuFFX6GvX5+/LK878z8OY0h6lU1tCRzJjR4q/xwa

AwjM0Mq4sBU/36vj7nTHC8RSph9mF19H7hLgBJnisX
0eG57LCTnxBvAZwg9d1ohD/5/Xlas8oCRfj+sVrWC80s/cCavF8DvSii3R52ITklsqmbbvQCjYqN/Dbe

CSaWAMtfOZSIlKqEj5wN/s9uu+q2KrRONQg20MxC9s+/Px23OZ/OC7l55nr09yzn6qJ0c3egtfARaeRk

3SqBGhK5kortu7oAqcZLEIaJEEqqptQei/2Qz6NzBP
sPMz+egeIS9PctsqjYAdUIU+TCudIbtOjqi2jRUb0yWfbXM6ZDCIUm08UiR7miK9hh7jJ7DDwG7Ecisy

u3eu3/yOpnIZsyQruqNHFhZYwoflSUNeYA1QMv0nQgpf/P+4c+hqBOmd+r80PqbYNvZi4IlIff9W/2MW

NN1gj7ph+QSdN1lQaTfEfSlzMzIHyfPiLdf82sA6QW
4qgkAagcW7P+pvs+Kl0CwPR6MFx0QsuyUB0bhK7xufooqLjeXObDiu5DZUE9/uylANSWtjrCyupCLFb5

WSK92cUvYSxqirhnKB/o88WQc1S39Q1OsTjrlDp2ORzC5862mBAj6bQftFJ1x+rgDRJbbui5GmWz6jPR

N+/WoKwig3l/c0uOenCYUE9Ha+z0NbXDtxhdpqWUkA
rCIRkeKwdgDoJlAWEAD1tYOdot05bOK+p8Rpgvd6izG6ud4wsIgCA7n3lfmxhVzAlY+/pO68eSdbb9Fn

dSAY77qjSHAxkmpgZl5roTryx1myn02nTqxx4s0ZMnIMw/4/T84cJCeZm35+gDta21176UucPgb7g1eH

foXoE4eGnggVfT6ldmoUkSyFz7sk31PqsxGMiRcx8p
1k22cq01+fBNsMIHTzP9eTi9a/zu5DMQw7/EMA5oCjdKSDz8Jy45/rsT/rdmjs88LvRijQoaFGy9MMQD

gDQ2m5+FQniEbK42F6b18UqGhEBCA2sGHO8COjxj+7UYUAjU3+X5AHY973SqokslDHVZdkqlNxDVsNKQ

Kq9/4sj5IvCZaq4TO80M0/0mo32NYNImSiMB81fXIS
h/B62CgZU0D/ZVJMshdksPVV0JY/qljS18cktwwnix2k/zNoJ7fuywFQzm7Nr/86770adzclktuWBn9m

FU79iVjYsi6fhtFntKUeMqW7gzesPCYI8pVdx7d9xXOu+kbblUQ81MOd54co3Jw228uy8+6Zy8uQgk4W

g1L8x1FTnWou/Oi82xBoOgtE4zP9/y2fljRHYRmo4D
oAM+0snwZSfrlbC4E50vv1S7jRWjkgSM5Jj1PuwJ/xarlW6e4fPPi+iyAZpz/NjrQ/rWWOK5U/4zuv7T

FMI9tQcsKsDHYIIDgVwxgXUdLT+KHgsNCGSz+YCpyswx/wENq5OdOhRQjPi4pHo53TzpyaI/5ujA1oY2

6af6tC44kEFrqRr2MSwIbRrlDWck6q9+RXypeTmmez
OQcuReWpcXE204iZTrLgTX2XmtRMWTiDmjcSGEcBuB0/uz+X0HDYVWY8J70cj8w0L5fT+EMmBq44fXlb

6Fmt8SCVitz0O7PK9dc4yq630y2TLsEC7PLyJKGNBV3+Jfz/7GUGf4q1EySTY+6nplrUuJnwPacTw/M3

zrk6MXtoaxgtND8YV9bNPdpsIyAtPkBzl42s8VlI7d
sbuk2A8X8Jv1t5q0PX/fAAcrSvm9tAkZMWF6ERMNsIAA+c5fUzHkmY2hOcTQ1h+I05gwjRpeQGJl8juM

ihDrfSNgi2K9FT/nNi8y9ZOCEdZ5xQBFh6/3MfTuN3XGQfCHZiNyv85+11oNQyyiu6plQaf+NoO4ZmtX

gVHNvCVh//I3jDxuFNLHZEfXLStJSzicXlEZjVl6G8
Fk3N9mj2+00ZaI2TLDrgxnRMp6zc3TN7N+wnt/vvm47qVsvu1DeFUDIoPcgNit7yRIvS+HMX+lX4l4m3

c/yc10Egiv1TS3GAbJ7NkomK46k+wxBIVDO924MmCNZ+xI6pLZe6T9vFQyjas1FsAwUil8u2BGY+/C2/

59dEaqnM61Q/yWs/QgjF10gT6DmYR3xI9/Fvv80YDj
t6aBl4uB1/131c9zPfi0J1TdLh2A9vnv/Y/T7RpVUBVGckuwwxRx32G81k/Hl6RK2c5z4t0Ed7/ejy8l

i1yHtvgMeqKLGDYBHdjKWVf6rOBVL8yyqgu/fDkEr/0viyx9ofO7//U9u7MN51WHU/9yCm2/j4xy81g6

S7/RntOz6wRG1ebKwNrpaHqJ8+/n5wa/zL5XCUMzyG
FC123AQzjU9FQU6AoPdtxFpClf/GdR7gJkuIVvEIMqhYbY90MzB3nnEKrnlKyrR+BitdCxniZ7jQqzke

FjNEbgO61AaxtziVdlO2naxBq08uYBPO2m0b+6prM0FDZ6oHsXckqnEkdPzDg2+qejk7cbzMWU6Rk4mx

ZZzunN5ZSD2475s301QsRtsaXrCNRagcccZ00WcelE
P8xrfuaGz27O3GKbbZkcGvDJW2oplD3rsEDF5sf0XWCgpoHQi4RC3Mn6CJOfYuRD9AeHkal++C2Wm8ME

vjUG9iD7b9u4CbszLZ4qNHm1eNYavtLZ89KBlvn4oHAIJRWz4llL1ybGUfTRL3gck448yAc+YkcPAaA1

4DTq1L1u53S2KgmMZg1ZCBeTBFqarmHabr/u/DFYr5
GGqy57dj67uyB7t6eaOyDyQ23Le621qWeXKA115dY/Zqts0I4pQ1yVCt3m4So+ooINOPeayRmaCoRoYf

dB6Q+asqgp4e93aPb/+aO9Hnak0aoIiHNAZftC53Whx9Z4SuaRAli/fMSqO8EjhUBx1D9IotGb1rGY3T

I6aodME+vi8qd6NMtoh6mGH5dx4L2r4s3nuL/Pp6+y
LdoAEHgq8z3E8wwk1V5qML/lG/YSeYB+CF0lrjz1buo06TiMQ6poE91ji+mcRcYvjMqeHIZTXiVe3lZx

y05CDkSqg9s64SJ9o4uan5u8p9uhUsa1of/SiT3pAj6mYLEoajsKPV/Ql776+BhtNVY9C53GTvx1vT6d

BwuRSn3cQ/Y8umAD04hp7lqO0jhFiEIvJf6v+vVIpi
3blq+ooShtoMsPQqpTxJk78AK4P+glcgxjYkc5PkGt3KmBj4nio2BBjxQ0+tOUTaLnED43U1gYLfhJxM

LnjpDODi3qC0rFH7ypaCogi8QpIMnKGfu09mAwD0AapTXDC9vCFhNY4xFkKkthwRZCuiU1Y24g3mKcMj

ddUrkiSl/IfXTPwJ0INuW/5btsg6FFzOjqi0XfadWG
/hbhGBhjWfTa138RaZ3gvOfO6OBhfGZGzPRz8yz3X96y9BpT5DbudL1ka7W1mWZ/ZzdvPz7/89hn5bUb

lPH8XEkg8xdi53VY/vU+zwy9/aTDmBJIfY6SHBW1NI/EjinaAdJwAVGa12fuMqbXX/+5UACj0IZmbyE9

g9HUwYewRas8xO65FrdyF86BF7vGBMBzJ2Wmv2kL09
QCDGdDjZC691RIobNE+frpQzf+V3alWbiLnlhe2SfYWPu9rxbt1jj6OVThTOoMTiZ56KmitBkulYeoSd

rLG+fatRdiGYpGyhrk5S5AWiQZxYtwMzWyvdzuCfHAUKEnkOPKevrPA1FVw0w+L6/tRgOaKTJ4zpkg4I

fqGgfEmLvVgzFsCoLHb3xBHrwqFuJtf8gOvgK5ax99
sXWj28188ztQfGH6k4UEWNAyp9Lia3kf2zvUq88H3TE66g1TCOTJSfEZ9cxg/GW8baVKvxwd0IJyeAhG

ama7aMIR4yKQGVkPhIJUHcMdYIzmeQkn2INpODdreqEikJrx0ItiHVsjAZg7Am8/peVOtSnKSR4546MF

o0QQrTfCFu9R11j9Em+0sl+IesxTSRuQEO2wtPyn7+
Y2+x6Kckr0EX0TNsoV7DzJbEHCux4ZLuHSIW8b/7pFR5thhBy2ziqxi2Mi1aij3KniXrEklLfHFdk0Kr

uqEYd8sEP75h9bzZr/RCSSroouaXZLzRsdxgsn2lSiP6Momw86zY4Me2ALT3zClkYGAf0qDw2TELaJ+q

pbMd43hwsN8hvJeTMh47/69KM857/FJ0K7CWXH88vE
pooj8Hyo9fiptzVbWIoAhx/qE/SP8UG2OtIroGJJ1dPitURVozDvGhDahxoyH+v3ugBg7UdvwBFBornq

N1/gtSyGnCk3PgaiZe5BeO177O4mdpzr6kIZTpzPzrizf3K0a7qzzFZAzQuI4aS677pEcdF6GhX0XTFp

q7kEW9XTJSw9VQbaZ7pdP3yitgR+gSiI7DKCk6jEvh
6yziIp3ikizTzRPrnqXe6IGUqxGk4aNf429fmtKM+Ac3nNmyIwcpwbRwgmKNJSt3hrX7Ahdg8b4VSuo4

SINu8mivQC+8R9cFpDLRd6s8jo9agtHsEu0T0hu3a0wVt3fl2hrqDa/XLuKc35+VdEpSmEOeu69z9KrN

LWx7tRU9z/pYlfsVWQNsWLzjVk4+U2onYy8isyUVhz
+vei7mtg8piCuW/XY1y18ECtdzqfdnnmHWWcs33S6HOXP0sv6hkHg7u0ClKbutp6e8qxHgFyrdaoQYnf

stUwP2phZ/YmQbx2a8OhoTzNWRxULe55BV9IKUNL8NTDj070JXRxS1us7Fh0kdGbJJHzDhd5kUtIyoZV

jars6BCCNEACZYuf3YJ4Unsx8Mg4QoaNsId/vYdlnL
ZpM9T6AOz68JKHeSfD/tzr7vVctkr5qg5++bVT+xsHW2+RuldC0zgZeIAJ0aGsb7KVbmMOxqM18u0KFN

09VTffin16uqTFcesBnDf2ny785YfOq2aFi6C72/4fsv1FKj6AXKtl1kZE039WgWBfIqOL1wd3jcPzZS

XS4l5TkgvDpUDLXL5r1cZ27P6MUPGIxjfOSoH94oBg
8qc+cTBSAq6NjlLYuGo+N0ukaa7kPgiIZOQYkbiZZaSxReHfrvUvK8UzG2gm6eLKv79r4kXbSJhqsqRQ

NDV7btCDGEY5bdDaDPcm2yR921ijkFLhPZCqXLUnFxSXv0oeXAED9MM62xV2LjkiuAEgidZpF2OE5irw

bMIx5EAHavH8e8FZRufumUAPqsQnN5mfQCmVyUg7kV
eVDm/CDkY7u3YQHFwCxtXGSRPuvaR3zMIruk3Sk1lWKw42iy6VA6MxAYV9G+qLwqyHY9l2YWsSAZLG3h

SOTq89OTOlKEWszJ1/4fIVq+AopNznrqKvjMxU+xaklqcsJ1ISOQrbaxvih0cpbn8sBk7gBcX0oNJqkC

vLMHJqbbt653NXSnK2T4WvoTXFInlr7OWP4wCzk1Op
xPTC8a444Uz9T666vfn0+EbVFPpucM4SlmC977O7tSFunkoJZ5oDmDyKY4l9B2gpnE8GqllW00iA8xny

Zl4fkj19aUXDGAoqnsWCa2Ejan2EqYgG37OKeit/aYhtXOG9FTTM5LP8Kl4ilj6GqugNPmc5D21fOc9l

motley+mDj6sCoa3ymv8aVoPUTdqJPWOA1FVedx6OlhU08
YPhDRGypE9P1ZVEETkioVN2IAmLIe/kk+vNqZ+bQTMiIR4CPDdnBaHsbO603389bm5BjJsbBm8dtu5De

aSK/qsPYFh2+uzua4HYZim+uDtGN0qrkCydlle71GTHyjkHuNLPpd9IieTRo33W+4lzvURjEHWIQA1gG

uarGuqrFB/1K+eaDriKH3OCPtYS49ORfUf+KrJPkos
+oOjTpvrBY7hPQ6wFJjKhsNLe0Xdai8+fpwMPlqxUpOtThgFB0BWjAnbcwx5skNVwrRCmLHS/uO6K1iE

qf+VBDQHPIkI30OP4ObpKHd+Rj6hGUO5VzU8kwa65K07yBgtWGgFDEgYcOPxNyxxwheDqboxcHYFPDgS

0TusNHj4cUXfL836D2mmJzbinRjbOGr/CfY75wcGq7
AXvEjhK0g7U2Gi4xElBel4r+ge6W257HsMwPoTHCTLy/jWRe5Whqoj3y9SIXraFnRbxTgDJb8QVcYaSq

XxE4ZB2N2gqBrkLnDSQr3s+1yuxmUl9/+QIDjbaYrfgxfP9rjsUSG3JUk3Et3aa/ORRgqW0AZCG7y94p

odRjNaB4WgoYQOsbgueBQoGtlzjg1GtJbRT5QHXx8L
pvpdmRNN4yIsiNZHQqmng1fvr1nQvAl8F0Sz6iO0m2z420NCZV2R59zxs/zfduz9UeDL1SJcAR7aPeyX

aD27xxsYZibvUwmVZ2xejg6zLhIfHrAqJCf7DgmfhKQwhdpnw/HnkF5HSfDW49igtFkxrW+3YqxYuQUP

nxpEx8yR9EiSKAQpOBcWhJi3Q3fhYeNQh4reo0bdCb
X9Eu+7wyRVcF6aUSYES6Wz7M/WR27ctiKMnCBdBXjlyQaQZVP3fTNhDf9BQF1Yv6op6lMvsRs0oL9hZ4

KJoPIduX6q5DMnFB8M84bIS+LAobM7Cx+2BgOkK7EJ/nGXmpk8WC9fAPSy70ogOoDbBAEECGC5TntrHo

1LK5tOsJ4x0tfH0gbAHAnn5jX4sEbqIs0B9qWCRGF+
LTw+B6Hg9GKl14VoaSuf1WfTxtf73RRcOexECe/omkeafKXe+Ygxdg01Mg/1Sz5rXxLQdGzIp/sfj9EZ

viZiAG6O3+msR0w2npC2pmLD2x6yfbLqQ6yvewVmXCYKV3o8uMuFGfU+Hy0gNE29+Ai67a1dX5HAlxT9

RWec/NsbsKq5KouvxIwrl10wXNIEfogNoLjKjvVHr3
k8UMXOq8UsxuUb1SnucwLsiqpTeDVl58looFzSy8kYQS+5sENGSPAfsPu8qM06VrIjnEM9fUNKVzelnt

QbJcWS3dFyiF+JWuCB11XFZzqsS5PVT4kEz/WkvGxHZ2Viqoy3CwHtJKXb7YCbz5s9X+Kos7vdNyXKwO

vePQf0EsC66sfzUB+njYAcsB2rclMcSKvbwBqA+Ezx
YxSdeWjAL91W38kAjqRoPv+KysyaXPcn9fog88ZC1t8x0pPn8t/UR4CZ2dq1yHdXcMJ+lJqaq3hrqhP5

qSAD97jruCOqtrL98kejgWEDTMVeGHqQAI/7SzB2aw01FvfeEyqNVMdI/KQ8NQ2T7066+G3aAIUaddIy

tAfmFB/v6xiYgcVKovZ0MBWMy2Zn6RbwY+GR8BhUQS
hti56IQMfpwHYalOdQoXqyFTQERUvc4gKYIKIV9ryy9WKeyJYqZ+pDOCqbV28wA4R14y1KuUztH0qZkv

7Dfy6kvIk4heTPg3o4OrrSyPH0Dij8gczzKBN3MYIOGywcYn34fnE3Xwkym3x4z+db/q5j36453AgB6I

i4reqxX08W4y55nvcBL//ClludChdILuoUPlr+Lcge
06h2lefHpbNbrwzFjYRN4Zm8VMcdvpkqdnODjCGt8hft7HoF76EskGnirnAhOUBqz76DK8ILMpw7zpCA

LSoHr1/oqvm7JSGxpyk69E+sD6utKAmks4fQfZFQEJZWCJungEA6xkoqO25LZAFbLu2N8mV9yMGSg+Sz

dkTdm7RXwQrQRWNa3/tK+ZrHa/24B2e7lYp6mfCEH7
jTaZQz0eB44r1U+R+BssvyTg3dErGRntkbaJwn3hiuMJxnkKJ2vC6whqwhGBPZWi+QbhVfZ5HFElR/SS

K9kmqeitimsG8Sgxvkzb5qZVffgTA980u2zvbRpmcVgNZYpn5cXv9Hl710OQsHOiqslOJJ4YOnGq+YfF

aibF+2A61F2jkj6PqM7/EjJXV27cn7qz99YsiZj1P0
jfoIAq9oWH0tqa/I1EkG5b0Wzl6ZrQ+GLniZWCepdY4bfHUp33wsoR0Bx5JL373r/tt7QSnpa1mrECJh

1BWOZpEOmYv4L/bKLXcI1/+jtwHC2u63MiwWAsQzUiYL1MZt46PQl7FIktOExTM0zARia207+1PfdT+X

9BPe6/Ra3G5GzXd0Pr5H7iPa9/fBHT4KSs/tBCeqGE
PKDjxEg33d6nNNKYz43DFzh7o8b58NMXabGodszncHXNIvP755HsvqxIMga4D3gGnVa7uIreZVDcylZO

J1A3VGhjU4aoi3ySZPom9wFJEvf1tEPE1MGYNGTT7GlJG0BXqrUgdcF99vmYAntFFAKX6LmC34EAep3D

XxAnZAk8YUzR10fOVJ+eeEDgyyqZ4DNErAv3UBPe+V
4uqb5fm3aSykRNCC2Vgk2iH4R415hP6IMOzfq527Le25eLZxEkbY3kULIuvFlg8TUrHr28UtZHiKg0oF

Atm6vAWzSQb649Q7g8qXs4TWqUqrGx2AgyCVb+jMdq/suiKsnhthSqfN7dn4K43aXwt4g7SUtkgd9k8H

mE1wt8yCOOUJ8u4DvnCQjnccs53MOjih2yVGoVq3hF
UIRmt/nmFQVGb6LhDa6MaClnwKEiC7dJlU9/zoLANhApe+pOe57s/RJsEOCFRz/KkcZGmKYAeEWzUEbV

xlYM0jKBLHNCWYutUOoUtOfWGTMeRWfimxP2MlNtirjpG1zLWY3VqPGfPHkLNiMjFB7lKlhHgmZrhQh2

1xijiqymv5DVKYwAkaW4v6wmnbDveGBBzhiRGj5XVY
ObbaqaYfJ3dWRu7CxbipIVQfqKFiIrFB7BXOpU8VyB78TznhYP3Pv87gRvtSWNRbmWwk8DYMwDP5rmC4

l9tLrtMi0HZ4sUH3A/aumqQGhV1BpX0GhaiYUYrUf3yAk4ajXUPGF9TGtHoLFVSlx/CRK+nAK2DC9j07

4fNBPhwc0xT+sUnw6R4sM6B97Q4Y0h+P27zxrsLVWe
OMoWO5sCl4mqkxGGr/hEk92PkmQGuzaaTo1H8FbymlG3eazaaSx0Tt9P1g0ui0eyidCy5B4Hnf5C9I30

6td0mGztmvzZNTWwuULwB1sN1OuHQHEEB5LItcdLeFwc8UaG4hRLjbCcd2LsjNHOiPIjl9orh6im9wQy

N8WLWRqn3tmzatijPX5KiRoNqYJnIVL+iC5mFn5VFl
Rah7XbpFBC5G/8vWHZDngXaxp7xI9IEuNJBVz45Yo0oNGuh+/SkJy1SuCXqPYhW6o+UpBXDyHeut5paH

c6oMlMVpeynNHrmJ+sdYpx+x8y5/qdpxIKXCe0nRXaJdh3xnXwohIMZqIBhj/cHxPeYNA7IguG8rQziz

gUYWvWoVn425/WOFY9FrI9Kq3JAvMQErQc9MYQwr0O
u+92bqhsFnZGrHOuetb6DDCvAAYV2pf6273ZEXuy9phcwcQ/myWyaZIwLqRwfhBhlazyqPH3o/3p3S79

0uOi/gmsG8WhzuH6s9D+fE2A2H7FBd0i+d446hkhmj+HGTea5MjE72oyLw4wLmeh8rlKMB/vp2XyAHkY

ua9o8v+9jFZAqDS9YCGcd0gQ+Qk4EUsU0v2vTHtm28
788vJnqTWDLvK/33/5+q7dqagRi7TtUINVz/OMptvH1v7/d92/+faHiG6eSp1f0X0cirI04aFoKMKMS5

PX3/is4SFw6gns11GUbn+wJwrYgS5c7zbNkO+oZTAIis3B+PR8yjtFQEVvlzdra4TDgZFz/gnz2lLj3Q

D7zbJiIITioYlB1YCm5W8E4Lk1zJcDCiBCuP9p4yXu
8S4OTFZlQJX1PphxZ66x2SsONoWxH00469+uiz0t5CaH13/K85yN8uCRr16kSaS5xUv4/y9lkMeEInvq

FmRIN3uF14YrHSVWJLVMRERMYDTEJTIMXPSBQTVUZOHf7OvrvkIb3OBF4mg0QaNSQuSImsasSxVxtIUm

GhQLTig7YaPXxlXP7CZY9dl9QzVRzxBSNge4PpQOm7
PK5CKPBaMVBkK5TnkEApP5WXJv8ZHQt1C0oeJCjuFg3KdKKcAUElSLgmYD2Qn785THf1OK1WXGQtOyNp

LNDFEaIqJPEeWxQgOHWbSOPDKo2EQgGjG8nPMjysL7QgWBaiO3rcThPyNKYdTSCAMVwsTDXmP8prRtNi

KAptEOHVJSftXCWuR7beZqViULniVLJWRFzdDJDvT4
dgOuYjMCuyRYHPBh9AVqUaD6E8zCqWyUE8WFD0JJ0ETbSPCgSrQKn6L7D6mWKdJ9K3bHdFnLV2BJ3BIO

6FSDJyYJ9+KgLjD0SQFJlPULL3PL0IvHTgTO0FvZKIO7UvtGQvCw0tZRGipOiwuAj+RcIeZ4JANZVRJT

Q1EV4QiXQrKZ8WtNUUR8LfiSAhQy5hEAavThGnCJ5s
ZT4+DL2BBHTXNkXSHoTiHTwoLSgvPLTeBLf4D4I4FEMdT6YFL4M1H9k0a6xrnw1+ZM0CAKLiG0BIUMXT

BsCfVZlnEEjqGDMjYEa7H0U6ADHjL5UBA7svU8j6EF3+PiANCiAgID4+DQo+Ur2OEY1lw2XuPPqoYPEy

DD8elj6FKGefGVByS9AwOKVdMYdeL9JecRkyHO0JTJ
iuDCauHN4PCRAsBGB7QU4+CRvoiSXsVT0XImq/iHYeH3komMSuBSyhuf3b09w/YsUxAL1lUqYNNU0sW6

AkiZm7oiHTnj1NC6aiTnqwIq5+VWurOJf6UdxxfX2abTOurBg4aSI9zn6oYw2bPQqgQThxcD2iwxE4bH

6sXJYaEaJ8llS4qKU0PR7iNk2SSNIxBDqeOTC3KlAW
XAnopI6sEgIhZr6iyRD6tJefK5n7cz48Lx9mylftEIx7DG8wWz6yYd5yLABes8pbyPA3TI5gMql+DQog

NBXfNA1aICX2FoAYUr2RIYQ2K0s6gK6beRP7PT4BTmGaJQBtQCArAKRuUQXmPIAbNTMhKEHoACNeDNGe

ICAgICAgICAgICAgICAgICAgICAgICAgICAgICAgIC
AgICAgICAgICAgICAgICAgICAgICAgICAgICAgICAgICAgICAgICANCiAgICAgICAgICAgICAgICAgIC

AgICAgICAgICAgICAgICAgICAgICAgICAgICAgICAgICAgICAgICAgICAgICAgICAgICAgICAgICAgIC

AgICAgICAgICAgICAgICAgICAgICANCiAgICAgICAg
ICAgICAgICAgICAgICAgICAgICAgICAgICAgICAgICAgICAgICAgICAgICAgICAgICAgICAgICAgICAg

ICAgICAgICAgICAgICAgICAgICAgICAgICAgICAgICANCiAgICAgICAgICAgICAgICAgICAgICAgICAg

ICAgICAgICAgICAgICAgICAgICAgICAgICAgICAgIC
AgICAgICAgICAgICAgICAgICAgICAgICAgICAgICAgICAgICAgICAgICANCiAgICAgICAgICAgICAgIC

AgICAgICAgICAgICAgICAgICAgICAgICAgICAgICAgICAgICAgICAgICAgICAgICAgICAgICAgICAgIC

AgICAgICAgICAgICAgICAgICAgICAgICANCiAgICAg
ICAgICAgICAgICAgICAgICAgICAgICAgICAgICAgICAgICAgICAgICAgICAgICAgICAgICAgICAgICAg

ICAgICAgICAgICAgICAgICAgICAgICAgICAgICAgICAgICANCiAgICAgICAgICAgICAgICAgICAgICAg

ICAgICAgICAgICAgICAgICAgICAgICAgICAgICAgIC
AgICAgICAgICAgICAgICAgICAgICAgICAgICAgICAgICAgICAgICAgICAgICANCiAgICAgICAgICAgIC

AgICAgICAgICAgICAgICAgICAgICAgICAgICAgICAgICAgICAgICAgICAgICAgICAgICAgICAgICAgIC

AgICAgICAgICAgICAgICAgICAgICAgICAgICANCiAg
ICAgICAgICAgICAgICAgICAgICAgICAgICAgICAgICAgICAgICAgICAgICAgICAgICAgICAgICAgICAg

ICAgICAgICAgICAgICAgICAgICAgICAgICAgICAgICAgICAgICANCiAgICAgICAgICAgICAgICAgICAg

ICAgICAgICAgICAgICAgICAgICAgICAgICAgICAgIC
AgICAgICAgICAgICAgICAgICAgICAgICAgICAgICAgICAgICAgICAgICAgICAgICANCjw/cAXnG7gylW

NzvlF5J9smHw2EKa9DZR4bf6GtECBjLVkvznLzZbuJMtKoWAAcRicUIrn4PUfgRG5MrQEnM4UcT4KaSZ

tmKN3WNWCjKRWxhRIcFBBbPUSgHqX5KEQzFEesKM2W
rDBvGLybTYMwOIWrDfMbSBYkEEHyWDQtWO8FADMpQ009pqAcGa5ZPa2BPgFdNR9qrp8URtOtGGVuOfqT

Xsj7CHpzVJ8OwEBrrPSkVwKtTYSEBsPoK9ipi5VkDfYwMCLMJKtcID9Lr1LjrXMxVGa+At3BYV1qx0Yh

HHbbDuKfSE8ycd5JVWxPXxFwD6GewFibLXbikTuqxv
ElFF3SCICjXUKojJGaTCqyNEABGT9DDRojPDL6AkKgbbThqCYxFOylDO6YYFAauaIgClCkZRMNSZv+Pg

5CVN5lk5SqLVwrCLBjOH6lww9NJUjRRwDjK6C2uVCfO7V7CQudYq7TNNFpMTAjOfUoFRCQGBveCS2DWM

4ubxQ0KM6XgGOlAQDpYHAddEKzFFm2F56ogVHqLPla
GP7XBVU+Fernando+Kr9ARTEcNHDbXMZkXjAwHJKGMiBsB7OeJ1RMv8XuL3MlEQ91wDjdsmFmMPwmIL9HOZ1c

YWEkMIVZCQ7AgZKotK4ahdYjVjKkQSDTClJxY61tjELnJRZzKOEsAQNmRo0XVOGqZ2LjllVwwOjikiUu

DJXzDBBUYU7BHAapsyTjeTXpuCofZM33cHszJW8YAb
8MUiZjTM2jbg5FxQKpMr3APRZpGD9SYXAhNSGwLBPrTVN0SARbXfInVUknMFTyBNBaTBO4GPRfPTNiXI

5HHtNaFPLcRyCsDpMfKOMxJIVpzk0AVLZgFRSyFJC0ERXaWVTyFLKfEVioEIOiKJMoLJd0ITQnXPBfRD

2EOfQePWLqAJVwEtUcLCGsLYSxjb2NXMGzRFUzLzTe
QCHtVUTeSEOkRXqjPLMoRXW7MSS3XJJkRVQmMM5JAfCcJEGmQHWgRZPcEKYlEXIabl3AIZCjDDWeWhM5

NNSrZFAoYQUuRRrxLKJjFSO8LbY1HFPgHRXeXQ0QGrMfMIFyFZdrWeZpCSQkMROtyh8SPLSnVUXaMwCt

PpIjPSTeFYFqBAkdJLNgRPZ1DhR9RDTrUSRtWF5INx
KdPETmITs9NtDnIMUaYVQcvv6BLBStZLGfANr5VnPiMZLoRMDdSWqfCZYaUWD7VGX2OMInESXjHF8SYh

GmQVBvYXZ1PDogERRvBXBvia5NKTEpOJRjJJT9ZdVfFUKdUKPtSQqdRCQrUKR8IsU5CJNeUEHbEL7UWp

AmWYZxFqEhKGxbSMBiFDGfds2TZWSvRIKmImf3MTOq
ZEVvGFYjTPpaHMIgRBH6BDg0YYEkAFBlUP3UPmCfWADqLtWbExXeSGGuGDBssk5DUNJnUIOfKJP2JJOt

XMXoHSOwIDj5ocFjySAjUGo3AP9DU9WlqyQoNiLCHv4Zz375GAO0OBIbFj9AN0kfDu9pWNKuBKSLDv8N

IFd2CPDpRuZ8UnCeUnDtDko3FTRoOXTzOTpkYVm2GC
E1ODg+USo0ATO9YWqoDIG0OnY9VirgGKFpMCV9KaUsZyzbDSL6IQ5vMGTRSt5+DQpzdGFydHhyZWYNCj

H0QpAmZYckVVSOBz4P









                    ID                  Date                Data Source

 

                    [id number]           10/05/2020 08:36:33 [facility]

NT INFORMATIONPatient MRN    Name       

              Date of Birth  Age       Gend*PT [id number]       Lisandro Pleitez      1945      75 years  F    [facility]OP[facility] Location    Admission 
Date/Time      Visit ID       Attending ProviderCV-[id number]          10/5/20    0730  
        ---            Glenny Castorena MD([id number])               EPI ID                   CSN            
Admitting Provider               [id number]                  [id number]     Glenny Castorena MD([id number])ADMISSION HISTORY AND PHYSICALName: Lisandro Pleitez Gender: 
femaleDate of Birth: 1945 Age: 75 yearsDate/Time of Admit: 10/5/2020  7:30 
AM Code Status: Full CodePrimary Care Provider / Referring Physician: MD YAMINI Pierce MDInformant:Current HistoryChief Complaint: SOBHPI:This 
patient is a 75 years female with a history of mild mitral 
regurgitation,hypertension, hyperlipidemia who presents for cardiac 
catheterization toevaluate dyspnea with exertion and abnormal stress test.  She 
had severalepisodes of shortness of breath while walking up 1 flight of stairs i
.She went to see her cardiologist and was found to have deep T wave 
inversions inthe precordial leads that is markedly different than her previous 
EKG done 2years prior.  She underwent an exercise nuclear stress test which 
showed goodexercise tolerance and a small moderately dense inferoseptal 
myocardialperfusion defect suggestive of prior infarction.  The exercise EKG 
portionshowed more prominent T wave abnormalities than baseline consistent 
withischemia.Review of Systems:CONSTITUTIONAL: No weight changes, weakness, 
fatigue, fevers, sweats, or chills.EYES: No vision changes or discharge.ENT: No 
hearing loss, hoarseness, or masses.PULMONARY: No cough, wheezing, or 
hemoptysis.CARDIOVASCULAR: Per HPIGASTROINTESTINAL: No abdominal pain, nausea, 
vomiting, diarrhea, constipation,or blood in the stoolGU: No dysuria or 
urgencyHEMATOLOGIC: No known coagulation disorder, abnormal bleeding, or 
bruisingEXTREMITIES: No LE edema, arthralgias, or myalgiasSKIN/INTEGUMENTARY:  
No rash or pruritisNEUROLOGIC: No gait disturbance or weaknessPSYCHIATRIC: No 
depression or anxietyENDOCRINE: No heat or cold intoleranceSLEEP: No sleep 
disordersPast HistoryPast Medical History:Diagnosis Date

 Hypertension

 Kidney stones

 Kidney stones remote

 MVP (mitral valve prolapse)Past Surgical History:Procedure Laterality 
Date

 HYSTERECTOMY

 TONSILLECTOMYFamily HistoryProblem Relation Age of Onset

 Heart failure Mother

 Coronary artery disease Mother

 Diabetes Mother

 COPD FatherSocial HistorySocial History Narrative

 Not on fileSocial HistorySocioeconomic History

 Marital status:   Spouse name: Not on file

 Number of children: Not on file

 Years of education: Not on file

 Highest education level: Not on fileOccupational History

 Not on fileSocial Needs

 Financial resource strain: Not on file

 Food insecurity:  Worry: Not on file  Inability: Not on file

 Transportation needs:  Medical: Not on file  Non-medical: Not on fileTobacco
 Use

 Smoking status: Never Smoker

 Smokeless tobacco: Never UsedSubstance and Sexual Activity

 Alcohol use: No

 Drug use: No

 Sexual activity: Not on fileLifestyle

 Physical activity:  Days per week: Not on file  Minutes per session: Not on 
file

 Stress: Not on fileRelationships

 Social connections:  Talks on phone: Not on file  Gets together: Not on file
  Attends Confucianism service: Not on file  Active member of club or organization:
 Not on file  Attends meetings of clubs or organizations: Not on file  
Relationship status: Not on file

 Intimate partner violence:  Fear of current or ex partner: Not on file  
Emotionally abused: Not on file  Physically abused: Not on file  Forced sexual 
activity: Not on fileOther Topics Concern

 Not on fileSocial History Narrative

 Not on filePhysicalBlood Pressure: BP: 177/82 Pulse: Heart Rate: 
71Temperature: Temp: 97.5 

F  Respirations: Resp: 16Admission Weight: Weight: 58 kg (127 lb 13.9 oz) O2 
Saturation: SpO2: 100 %Today's Weight: Weight: 58 kg (127 lb 13.9 oz)Physical 
IyveEZIo9Uovw: No JVDHeart: Reg S1S2, 0/6 MLungs: CTA bilaterallyAbdomen: ND, 
+BS, Soft, NTExt: No clubbing, cyanosis, edemaDiagnosticsECG: Outside EKG shows 
sinus rhythm with deep T wave inversions in precordialleadsAssessment & 
Plan75-year-old female with hypertension hyperlipidemia mitral valve presents 
forleft heart catheterization to evaluate shortness of breath and abnormal 
stresstest.  We will proceed with left heart catheterization.  Risks and 
benefits ofthe procedure were discussed with the patient.  Risks include but are
 notlimited to bleeding, infection, heart attack, stroke, vascular trauma, 
anddeath.  Patient understands these risks and is willing to proceed with lefth
eart catheterization.Glenny Castorena II, MDInterventional Cardiology 









          Name      Value     Range     Interpretation Code Description Data Hannah

rce(s) Supporting 

Document(s)

 

                                                                       









                    ID                  Date                Data Source

 

                    [id number]         10/05/2020 08:29:27 AM EDT [facility]









          Name      Value     Range     Interpretation Code Description Data Hannah

rce(s) Supporting 

Document(s)

 

          EKG                                               [facility] 

ODSEXx1fSeOVXrWsi4WaKdHpFJDbMD0nimq1O7F0vYAcV4ExtRSof3vpK0BtJ0CeRRPnXUXUFE0RoSTg

jb2
UgbaeoH7Fxj2RKa801PI4WkU3ieb2Mg1bsERAfzFjuAx3cijMuLbdRLML6LGMbh4CfEJufPFayNUTrCg

4xpEYeY0GngSoqCMXnKKibINMtP43wvWAcE2qCNOOdBC0qi8OgtlihO6zixlAgo5vYcoDrVTuvWoGtMz

QcKGFglqUdR4lnaZDupJisAY9+CH1nh8BkNvObXBFk
CX5tiue6S5B5fQQqK5GoutEfV0W4BzX7jJBaQ1Y2sFNnUR4GBK7lJC5QYrLwK5QhI94wlJ0gQD7XwW4G

rzXbTA0ka9SvdoujX0Vbi5JXk805ET4CGOo6UQLuQ2RiGv0eMM0+QM5kw2WxRvQfALGsKS1wnjy8O9U0

zFBuT7ZvgmXmB5E8HjY1fJEyZ2P6oDZeZC0CWL2pWU
0JVSOwK7UmP66vjP5gJV4YbZ8DbjKvQQ3qj6GwtpthN5Sfm0KSl218UP6MNPt0RAOtM5CuF7TkjVV6IN

4+WI3om5AgAdZlXGIzOY4scwk6B0N3jBYxE8EveiJuK0E4EgB2yIQuE5K8tRXyCF5NGT3oEM7JZqjrE6

MiF53wdZ8vQC7KdB0RdeMjBP0cq9UnhgvbJ7Cbu5RW
w841XJ5LVFw9MCLmT5MdVj0gYH4zoIcdjHD+QeUiBG3mhiiyTIHrGYAuZso0MJ3UrLHeRE7Uf131GN9C

vVP0mFJjFM7BsILmVTThGtMlNNLhK6WwMN2MruClERxoJzVxG5gyXY9lpTTkU06oxN5zSG3LVRSuWz9x

lWPvI045yxsejy3+KS6nj1DlBdXkBzVmUZ1qtuy3O9
H5pRLaY2KvibIeT1P9MbA1eBBxA7S0cJOnII9BBC7gED0FGwLsO2AyE27bmY8wXU8MxA6AnoQgWC4mq9

DotgudN1Mgz6XCx550IT1Cl4BviFLsRCHrcVH+IfXvZQ7sfeiqYERcYGIwXpw5RA0UnNJdUX2Uz951FU

9IkLR5hYWzVX0ItBUzXCBjYbEnVDOuE7veKI4SsqQf
ALsnRkQuC6qmAA1unZAgY25hqL5gIS1KLUQtRk9qcIGuY976tefdyy0SFobkoPIoYt6fecOrBcaUBSO0

JJIkz8WoUCifNTwdFVXwVe8xoBRcJ2XxkIgnOAGhGEllEKUqO87hqDPyW1ONJLQaOM3ki5VpmpaeS7ba

muTym1iQxhFbSSluBsTwLeDwKGMomeQiX4QvzKFmSL
UbIh7wLM9fiFzxpWD+UdFrDE5tblqqYYUtEEYhOyo2OE1JwERoSG2Qf672KH1QrNG2aYNpSL6QrYYaEV

KnCaKwZNYtMAJfMS5PhoYyAGrnZcWoW8cxRW1juYPnL24qbE4tET9SOKAdHm0jlIAbSDngHAGwUo0sRY

4+PaKlMG0qafddFGFxWLNvZrv1TK0JqNBwNY2Ez609
ZJ1QcVW8cBBfVJ6BfPVsOIGgZyUqRFYeZRFfSX6LsvXuOXnzPyPsM9hdQR2mlNVkY40gjF2bGN4DGXJm

Qo3aaMJgAFmrXSDdJq5iTQ3+HV0rh0EiCfXpZbVuNM7mily2H8W2sHXtI9UzruCqI2F8FjR1nOYsA4A7

cZThRJ4KIH1bJZ6SWRTnR4ZzN55faP3oUF4PxT1Bpg
MdHI8cn8FtktowM3Ehu3KQk048JD3NuE9krw2ZnAIubAL+AbXxIG8csbnbHRqiSJFqPta5VO5OQVI2YQ

GzJwGnZPMyHCDyTXKoX9zTEKbcCWEWJH1FPysyKFJbHXMAYF2HFRLlUKYgYNRnU8VJTJCpXHGgZtQtG0

zaSKUnFAQHLU2NOmlrVZCxLPTHCF1DVQQoWKJxZIDh
H3XHCQZ8BRXuJsWtGTtiZVdpQFJKBV8JFlbxUhBgNMY+QhHyPD6iqvqsRuKkRS8xfcd5P5Lmy1XSQYGs

Ld3MDUZhJ1HgrLVcXT7Qf573GWY8ISRdPo0+HF6cq3IkDaDsHWQws4DxZSjrYQtgHIWlHQEjXIVdPOLg

cTDUq3zsDtRgSRT4ALNwXnnqNZCjPDNbHQ35JWPlOQ
MWAA8MLVJirNReWMAqRfInCPPHJG9Md094XM84uoQyOAGuYLVhT5IlqZK0EVW5UKDpXT2xm3UcUTeqGn

2zxyBoRkwAFzRqIBUvBkm5IHInCDRqY8ToTIU5NnM5GZ0IoWo1PQCgT5LhTJTxIFMkw0DqOs8Fa2MaTQ

VvBivhsY4so/S9bs9Dy7/cTY7tmlOOKye2MUxEXJNz
b+z+wIYNBN+lGwGpEz8FrRk2lPgt4wx8ZvdKUgiwIFM7hOa/rv94+Wrjz/96CtefMP/897/925+/bPz5

f//9CYo3137/PoX/+de//OOv//SP/+tP8+vPP/7HX//bP/6of8aXV8//OC///d9/+c9/wYV5jnt682/+

3MUxP//9+89//HhmXtYW5jKeukQ7YZewnL9cCLatB9
XHcqrbz5dHFBujkvoDFm2OyJhebCU6goPRofuMfmoASmx4Iwkc3r5Db8ud6MzBy3up2ZQdf8Qrzo0ohW

f0DB48tmihuX6G74FLgwvalqdJ97K2pHndevvl6mJoHf7y9h8NjmHgX2W0sqazs13s21KAyu0a5gsZ56

L5pDlexhmy9cThBm6o7p1ZktAuI4f7tuFqlG4C3rVR
eeZsKgpG7KGHnYfte8CYJQ7Mlm0r98I5kfVeqI4B9uIcN0k9Zf8BdPhlVjBkPvCsmA7XnyJYMeWUymrY

4CfnU4Btxl6pmVwHxm33r0z3Htl3yD2X6Bqduiv6WC0QOLhzf4lClMw5Ppr0fP8H8LgfauN4PT5HHcoT

oruvKKkkdpVolc5pvSeDdi92c45bWrs5jsm03rH6WR
ga4gmyvULtcddMv6t6q4TtP2j82k0w0lyb72Z0luJgm2/L8mJ+kBsnjdzm32f92df4yz6pN75zy7q5+e

IL+nq9oS++oq/XO/riS/r3jqErfppkEtcQBgu2Kjk5NG19szNk6sNWDNy0QoWVlQ0ZhOBpvyw9O53uMZ

jSnk3vcWsc3KKYWGQsm87mnebvyYSR9HKTNA9Yc1fh
IpiNWz9R2Oeja7gfzE/r3D9OTKR0pMnQL8OkIszREnK14edVzWj0o25HiTyLz8qhD5/G2GbCa4xnrlg2

6lmMDik5MUaux8Dhk6SeuVrGxqU2HjgmA0gGu633G7NfNh/mpwc21Ix3Kuvu8xOrjL8OkIxSxxyoNo/G

5SVRz2znzvP0/g14dWkt5tElJj3vfzlD5UmdHmxl3n
IplM2CyVhhn4wxI7/C1FTLg9xcmaB36yaCpr6Up9yVdy82K2TmRd/yyjcz6Gl/YEv69pMjaH9WsPlkh5

NjLy/V3SgKt31xVgt99hWCzy96dN7pk7Lvg6an0quW6lW0QSbmrJdyr671AdZPMj+vx+o09SpaDQb+9v

I1rZ5OfLelm/NjL+/H6P7Yy/6dWkG10oNUXjC6eHKt
D7VrM9hyJnnNOgW7BptvyWwgwX46YhQxUP7jrWlW4tefufhA/aIWiB7Gk/whpz/kL3/I6Q/5yx9y+kP+

8oec/pC//CGnP+Jlm3bcU/gLS9R0D/7yh5z+kL/8Iac/5C9/yOkP+csfcvpD/sJGaL0Xt/whpz/kL3/I

6Q/5+/sQqJK8x4YNBjF/9qqOrTW5kXN+07y/08iHmv
eXGvlU8/8KJt9l6f7x3CSMea4b+kP+8oec/pC//CGnP+Dww0rtO/rBO1L1B/7yh5z+kL/8Iac/5C9/yO

kP+csfcvpD/uKDuG8Nb/whpz/kL3/I6Q/5yx9y+kP+8oec/pC//CGnP+Wtg8efM/yEW3P3A/7yh5z+kL

/8Iac/5C9/yOkP+csfcvpD/uEAwY1Wn/whpz/kL3/I
6Q/5yx9y+kP+8oec/pC//CGnP+Pwu9gfU/fHI1J0F/7yh5z+kL/8Iac/5C9/yOkP+csfcvpD/tVVhM7W

v/yh/qVZh/uJ10itnzNlu4r8okaW8Lw18RsZkbP9aTYN83TAW24GFv6HEpT8hU5lnwgJZra6vV9pmM1f

rjcy84notmR2heelTZ8jbRkIW8iq70APwzs1RBiqkZ
1V8ivoR4o3KlzF7K4X2yE1m2Q7jv6qtZ0yxzye04spthZ0sdntFG4hXQsIYWaP5yGZfyY7QPQimX9C5I

uxa9G5IvrfN2SbcozaG3nachBQ5oAdP2R8jkGI3yJkD4V3ysGS9zVnX7Y4puNS6pSkG5G9paHPxhpnOD

BiwNjBTQvh4dAFkyt6SZYlmt1T2Pdfk8Y8Rgdd9H8C
8la6W4F7g+2KiM2iodsfc0sydgPudhvoIRqzGEtBDaqX24KKqjJ0bTLdxt4k8anjg0A9Cgtx7P9C9vr5

Z7M9m+5TgH1kwtiim4tvzyusC+bfq+h88GcGNYraGM68ZadcZLhnO35UVWii8ewnwQf2ST69t63zbw7t

pWkxN9nuAdxX0rbWUxM3soFQmW8d3HBri3kW7qdi60
Ryc3qY2m+UJFNdDfLVW6AmQ8c8MNUdcGprDfREzDtNZz7PfUmr3jgIAMYt4dJCZ9YkG5y3PMYnpByWPy

JTmDgVJl6FiVth4leYOBYmnoWJa2HiW5g4FybehYl7YeJfmDgYJh6GiYth4mOYOBkmXoaJm2HiZ5g4Gi

tyaaxrKyQbkTiiOx2Gnalg7b+HPWtyAqrXy7Inm2y7
LDQux3mbTkG4yJoo9hO4rPTKdiiRB8Q64srGqkelx0Kieg4QPk0XCnC9wqLZ3QEFb9qbwadlxyMHBR1d

qamd6GYrvfDtRj7iusWMZNUkZzvb0GDQl9NtJxgPtaTKVNMumuez1cTUFc14qhKljEyxLSTd24j3+XWX

B24u15XnG17e37HiOJE5YXOs2jvivjBmz4McYZj3Mm
sFgHMkoTMtJfxaq0mC7+nOPnc1sBkrlKCGrMWCKJeQRmBlnsmeCK0vq16z83Ge44xkBh84eO2o9YLS5I

NOE6zLQ+LTadrUFr0ei24t4cba2yalqdS/jSHnGPLXGHKOIX/fEMW1h7mtbcY2rpognW6ruBpM/DWGnG

UQS8XBWHj2MVguN9seJ1f3zbh8niyhgX7njEwZ/DWG
sY4slhIP6J+vp992pFv7mnDrrjnho4j2vfTotuu4k+y2uvQdyJeYflw8GpXz4ESGs4MSYK+ZvGbymslr

Co7QvUkanmNmHo/AeCgoku3zPp/J7S+Fk27sHX9vFmmXLDhpcB8NUY7vV9HpCYbHAMVpJSIyUYuFcvHz

VYzhV2SnGTgWDDUiWVD1BClAdfMn6/ho1/8/x0gek/
Mj+Hk6ulhG+a7cY9IML7n+/RzdID6549/6RHmhvJ/bqY8EZPwMmpGpxc8lmAJsPNBoNN+lWBz5SN+g3k

Z7vWlB0KquJnNkpGgO03Q7J/FL1AhA72E2J/QV0IoN35B5V/Ez6SiZ52H9T/Jm2EgT47C6B/Yh3JkN11

a9G/Om0XiK15g3T/Hq7XeC53l3B/Uwp6pyYyKsGKJl
NEJ/4Thj3x8K79bal/tWD9p7At/CdxLWQNpQL1oc8ZOxhMrKYwzrjzxoT0HnrV3AxAA5N/ZS1i0YW5e2

RC3BbYK8ByD7Jc8LdVK4CkX9Oo8MjRX4AeD0QB2WpMl6IqP0NR9MoVo0IpR8OK0DdLn7ksR9Ap8GaZf7

ugW7Nf2EfBa30tA4D1McAwG1Z54sKf/G+b7xjA7H5Q
A1b4dI0OpAclPd7TJiEO+UA+XY9kT4Y+LWwGR1M/DL7x0WW2d5NP+w6mP0uWmm2x3pe2BcbVsl4t6yu0

NpiGkd5i1mh2BwaSqJ5t2kp0DxdJlR7a3ls9WkpQy50p9dd7ZjiLm26z6dx2GzjMn85c9k4b8N+5QNZU

e5odx/8NxG5B7h8/wMZzM/0Hs67lCxAcTbJRFxUUiw
Y1UOOIJ1pm54RS8bHIXGnQCArPROlJAORiuz7OpEJzQwbMYZguJhMC6WSdZBI1GDHJWl8LNGgwIGykQY

RFZGPpBPmtu9INcLNDVoAFJfnBxK4gJ2OwEsUAuWmyMlRTEFSqQkIBY2NMLHiXKjROZ8mjKYVtuPCvId

o3CHxHEStqPZIUUeGFXTezxHkZPfU/cKwiQsXkMF+4
YoaQFt2U9/hzrXXwVF6UaLOhAl/ASFd4vfSUz5RkqUCeIeGHDpdTbUnDVdzAbLmINkbLfHjZFnLwcvL5

rtwpLv3UvmGNpJot38TiyTDUlLhCKqTsRjM0404KyDeS0XEZgsglBvbCJzsIfqjYWLnWJ7q3Tg9Rzb7X

MPBUdIYSVzOlPyevIDYLVkp9RhvwViUDP+b+iEQQjC
WJoFBjVJmWcIWtidjMJ0iPn/X4oy7CY+C6lM6GlUx+Ge9n3G+g33vM/rp++J3+fVdUOOgz2+UAlixW35

+2umaVwIEKgUN2DdN35y51D7K+sYt16ZQgdTiLlHpTEK99+nTZwzj/SEPDIIQZiERdiAtIkHjOCERqCC

VYOhMyFmlrcTx725uS8krFTOXAUDhmWBvkWVJQfCIw
ZuTAjcLiC7RtJPETZuMRMt0Y62r39v3TuSStuMTtiTpYAQG4kNT9KuGJv3X9uYwZlPlDJYNfeOsG5HsM

uUPfZaUgeXysA5O1AeuwL2sGZ7HF4rW7dHK2fj26ZZ4EYQYVBuYKLuqfPSZfadQSUHCaS1hW6x6oCD1t

90SNuroXHsLsqVmyeeSmjWdHwDu9AyYUVXPeLAFbJm
OR3EyJdMg7835RHUErxVzxAEmgDYTcdyhWin8CYXAEqIZVGmdQzcRt/DP9SiZJlttT+jf1N7duEI/lj/

Sd9mBOxbu/HKa+U2ctHrNgmo3Os57h6U6NQXW4frwSwicMWIunZdXaRDKLjXBQCGBSeHOOevs82LQJ3l

4hZzNpkYLaQyBLELXbJAPqrIWStVCmlJKtA4ZgfV6N
igALYinzEAgNvcQXIv1IPYyuKJPcsCBzPzYiwFyQopMKHlPKGwZDgedYphoFTBWxCCWsyjs9IfSAjz2E

bYBih3AcGQjk4OUZPSXBSguHQBmkVCKWFCN7zaZvvcKG1DIWzu0KAXq+qlAUWnCwbJWIEkqyst2St87r

FP4WG8EOfnedit7wmKk49r9bsfy05QWq/z8YHwdFmZ
+1RosG7uaXFpyVhPAaGYGq0whEYhVxftPXYsCDrwNXIYC3wfGXq2JcIFfMwQvlQhYGOS1ChszFowqD2Q

XfddGLdN/YlDR8ofRAw6ownCgI/+gu1hyrLEz9nGh7gCZ847bpGOlBzTenGtONgiqB4e+6ByI31PLXZt

uXvRoBwCItSUGp5tsIZeuMS8icXtjAZUiVu0l5LcHY
jsg8E+RShZuE2O4YPDq+NCejKZUMgYDmcSYtO0XYTcypZCJGora1dOsugieWTyStVRZvwTiNcp2Ym9GW

4Lk8/WrVxAQHEwK2sjdSZgrX4JXJcVHD0dULrcNzKdPzsEHQWwRTcDOXUQN6BZ4sPl4vu5SzxojM8xRJ

84C3Rd7pqOYkohPtBxZCOwTX2R5YAT/cE0cpAimPR7
U3Q1PhenBfAARAl3XLd1VH3OzAPCWoaEHYnZUMNNW3OdjvBDcEfwJQGxARnSbqvxASTgVBhJcuPPhoYT

+Pj8ooTJJpsDSQmrQhatP37Q8oLIcWTDnshFMoahWSCmMqcrROXqCKkRRtRFUTUPQND2jDSOfilRNvNE

hnhhLOa7tDNI91QTL77ZpeXTXjDnvEqAe9khqSHXHD
t5qDFlVPfTDw9pHKdMQUck8J18Zdhm1cGTMgbGNPmQGVlWmsU4LFP0uhDIftq8MbaOCqCnHLI4cRrnqO

CysMsSzXEoiLiOjLLanNuUpgJkhJqPotAhgXIJpnukMgRE4df/9a9/+ccrJtE1H+//47//+Q//x//y5x

//957k8PVT0Tz6/8uegqhl7M/zoUZTHy3g0woFIYp7
ClBXzwRe8aJ/WDNRAWMj03by2gizQFTVTzSIJnPmjvnXm4K3E8dNr59DocVEghUJJFbEDilTjLQjL7MU

GsRRwsTHs9QTl99zlHQ6aqn69XBIvX8Hp6VgIRdPwCfoIRjAQJlGaHOOIXtWRFDSPTTJSLKULhU3a3Xj

JvS64kCQ0FTOPBEaCMBUmTzfePZl6KfaOTIIXgXALJ
pYGz+p7RyuBvITdLyDSZUCEYnJiAiU3EwVzlfRbI7XiYEZqTGGXKcBUzaIpsjCMKTRj+e6tV4HWJSd9A

N46Hc3GEF8KL4mF0YNMkgLHmiVMWFzjkqNhLIbm587QZ+AXHr7cYdisQBVZxuBqZ5RUfs8EylBUa6Pob

QDRoGqlyrvcJOiYYGJsNNOG3IBCVHyb3iQmJhOcD1v
f5beSGlMIbmZLUk17gVUVZvhoSP3vuoP4ow7mGGaN8FqLDfoRJj/dVd9E7Dnujubw16grD+Vy+/51w3X

sxb/xJ18dAFup7B/o/jprcxylqtu9wnAIlbv3VuD1yeLdMlDpkRuyGmm4WPBUWAvethOd7ofX6BMVX2m

Hkeayh80Q3djBagJPGnCtkBKgTBKkJTvxIEvYmeOaH
fzR5Ygcp9DGhbrcVEIwR7lCJNzRKbbivtfAbqEQOs8R83mNfno4HvaIN6jul8z0c/Y8L1vksWQXGbAqY

eqI9zxprzM+uw4oVW1jrASkm/Ma+UtBlfQLflBrAW1AcUqm6nvUwPd91K9Od820mzeGTTztedk5CqU/8

EibEDaxAOGn+1TRP3HJsospyfxt0MTlsLZQ/3E1vFm
ah2+vgxp9lyTg8GNYqQTUpxIIMKvNLpRKb0QWdBQR8f6Wzw1xZA/sdFPbPQTG/3ERj+xzYtgBCqYVDCp

QEASFntljp9l+6BsYg7/xbtQNjEH+eKzUDaxrumEQQheM/yjMSnY99CwLmV1kDViT4efH6ZcwB4ZybkH

XLxZTgjV6RZAVJdUuy2NZ+sVLtmOHLYd1VqU4TB4Qq
/7ctyFSlLFgdTuXfTCp3VQ8O/84KTUM3egNDxH6c3CkeL7NRHrYLayb4oNFbFa4FczoHMeMN1lQehTMg

uHmSzgLtdWKweJTkiS7Xrpra53xSLOqZ5qNwz5aX35ckAgkwmCIgcXWMglbMc1rq83gFHrMva7rQ59wu

Pt7vDWxFVfONv8T5XIn8bJFXW++SxMfEPpE/agzyBM
xgRmV5hpD7ZFfJhWNkwENLo15Xepnctv/geTQAWLCmgT++yJeRlVY6s0o92Lpy61xQggn16duLgzsJtm

9zq3bif2GNwF71QVOHeEwOPLJIrUUWjEZdMZFUCQQjCf3Ub5F/LCZFoyCWhOkhZUb1W8p5yNJ5pv/p6Q

nLX4j/vc/py1+I+Huf+ctfiPg+h/pfe937j9849OoC
X3W8G+70+uxX9mJ/tax5OhZWTOGQEGjDKv9AINl8mvYMfFVk1IWg4edEToNyD0JjNaHIpkYhfZ1KRR6N

OE3H8nf7leLXVwqULPflLSLRZFEif7Uxc1r7LLlgmSEPJSPOKXUNBUTPCX1tndytBCxTGYaQc1xq+JYx

bK98KzWOIVZIGZj0ZvtXf1ZbhuQsb4Kzjp0kPFBKIG
s3muaWqVdkW6NtYfKbiLKxHdfwcIUCHUxW47NIT5mrh0+V49bfMAVXJO5vQTYtbizeIl7YghZ/MI3gtx

1Bgy7W8V+olx/VE9yohMRBssZeDYYFYWwQMNCItpHKu4Lqm9YKeEbpEhLHcvxfLyFpjNNshD+Ilx/MSE

KJXYmH9g2TEEZfiwh+uZRyABjkiK8Z2+gwH119QBk+
AOkO0ZlPNsftCwU9IAmpWdG8cbWk+DSVgEfFONwZFYNrHAMWAHvh/lJryNEvWSn2WWlXaRCuAGMyeZsh

YjZsT0MCpNqL6E+RyVsPEBrEhZQ4Y2Nc/U3odoKHYOKG7ySyJwFiANmopfOAv8GyfrMiRyEWesbhTpms

Fa7DUNLcdS6jylCEOYgRKRCxqA0qSBNgoJhGb+r4uA
+kD6iKJ9IvaGQnCmCPNjcUqyj7hdwbChTgKLOdKrLxpIkLdx0OK+T5yGOdN0KqBNnPyeOLnuPB0zlhOe

yn2i8Q4jTpjWKjIqUA2l5cwa2gchysp/whTv4rAkzq5J/6fg2MVrsWkp7fqCr/LUenMQ5I/l+9+/ffEr

W7qJupE2zdxTPsHogtgs5Nuvuc+/vrkX3j/l/HdHua
HdMZ32ZlSsKP+B36MlzJ+3p0+0i7gA7ymRnUXgsAU2tv+bPE0UCo7AdBU9IejlvceuiqgpR4NGozpsq1

XyXW+05j6ZlvD7uyuRDcJdowfBLhdCi/KS3T5oUknn1xK9D3hz07X5N+qdC+H0sJf91QXt4cMc0AFsG7

uyQIjwJYnK3v6OtVBuQbi24p37bvnT1mKqzIPryVrr
iJI5k1E7v6e/ys/ze6+Y/4lE9P9fPNiWgwkpfM/n914p/6HMp2Pkc8jv5oYKoyS3u410Vf6wXz6Mp4F+

65rFI3XfQbvKeiWlhl1fxVUfUIL+672/OIofkfz04Qq9Yf/dH/0N/jnoyvUqvE94ZbxdA055MKuK+9uf

62v0Q910D6aW1G0wKHwCSx54gwqEAl3Gaw0z4QT8ay
v36lRpAA2A7QXbr+qGbTy9LuiPBS6E1sqdg7pcwB5378gt8QIyZnCHfZ/RQ3r9Dy245H+zmvuoYc2c43

rve/vdQOgU3Ec6bIChcGgbVF/5iHSCgePpD214q1F40/KrXd1Qo+cf7d+M89Oiuyi+Z7xfafg5bKxo+1

m6tN6w1+uxxkuOV75URCrRF+WF8n5+F73dN2mkf3Wn
2MKyDLUl78XRWgabeklqFSfTQANoV3y//dkydlDPf7/r7fnvA+YVlcG91ur0H94fu8su+30vZ//K9pxz

L53Eoeh7jKhuLJCdmhg5axdz4ol863uwttPvi8E/yo5yQ/mud+a1d70r/v9Xtnvgznu/C3v5yH72/VH1

Xrn+lO8/vb29wO412Tt7CN+5R3gKUo8M5cB7uCwgdG
P6U6ezVfG8bV3MWvvbYWfwURFW5jJdn2BfeQ2Kmw+xBxD4w2aaxvpvG94mght73sRrbx13iMi40XDhm/

Oaj3qALvc2RsIBbn5wRs3pX7hbvKXIEFmDcBVSd1Rd7vHLydrcL3M6DldUkPRPkvivzlz4uLzSQZ/asB

jzJ6E5wAv2o9J1RjGFtzO5xv1RVzSq0FVyrHl9ou14
Km9XxGTtVw2cz9qWW0CcT2Ctl59+NpjZN8nPmR16TLuE3UK4kPV1qFyY13CIF7RoJ9fHvkl7dJpUgdK0

XFrSUwV46xQX47zS1L6Xp5HU3h+gov9g6wY4fkuolqfhSk/vinuh+RJ4rZ3ckeMC+jk8ya6Lhcic9mc5

U5xSepL1a5n1axMqSD3JtY88XeF5NW4QtYB8BVGGW+
QdDEoA8Ujvb9Wcar/U66IVKGN/B/k59P0xTG0WW2+gB+qAxdxj13JU8H67vtpkr/j75LZax/MzG2K5TC

44H+P5TXsioWgTnoa7pwG6XbqZNh1lvjan6ioYh8K/e2AExh5cr0u5E3Ifesv2oisNaG3979/aw6GBq2

efk8Wd/35720nbUeVmoV7Rmasw2pAwAgsl8CIbQS/g
dwLlifJCGfXmZLHKhrKj/Lksug1enlBrd6Njr1d+/CBPeC1GIaLggcTy+9Xv+xq3t16b9JZC+Ij1T8qh

aww2uarEv/PxMbJ/uhofB2bzfOu/HPs2xk9NsoIhoV55RByqIWtL/IfCU4jeVayfE9FDxPHxiBh1zDtL

6/xH4KTftnel7Nk7uFmQV56Ya2P1+tX4yvppqd7bv4
lrA9zN3u5nrFWwaJUWwEyKV+I7gMGfRTBWFYbtA59yrmRSXMT8OSVNZlgT1pQa5d1gtwBsIPBI04bx/q

kxhyUq26S9btv/abnq/CcRPdVcRMr0G539bXKYVNeDEpBPupbsgTF8zALTn2asLhP8mhSe8R00UMZ/va

FvSZ181MSJq1eT4XVaH6sP8EQezRh/7gq6sY3tghxU
mWgCdqIj8OZ0iIOfFjGe5ZxM6eqSvp3ZacSjMYynAO/fvQsL/Sa4DSkpI+Y8xO7H3GP4vDyMjInZu9dt

OD9qL3Dl7WxsF2Xd7FkxIpM0Ar9xdNeDUwY/cC4j0mEj7zaV08UnY4RYw0++QkR/PI/oA+GXpkH4KA10

nhgXys/Kmgv3o6DdkF1/J77tiyPncvtoeAmR5/xGPH
OvGj9BI3rIDOlJ+wio67E0Zsnt5SFzctcx5rcWNvMPDHuIIe6WkBplpqkkhdbnxuTUeY5iu7Mm9rtZKb

/L79+2y3gsZF2qoQDdXTZhTF+eR+sTGChp4km2C0wp57Hs1/dOy549RMHRjn7Gwmq6tndga750RroM4V

7yQDlQnigvlJ/02ESORfXwqZsDr3LmD25Mgl8t01kY
2dnof8Uld+4c9MvrKK/N2dJbFdn5alZa/XmOpk+MN5qHlQOKHQ4eyUdt3hdbgFrbgv2VLcKl/0y68c+k

K3DD/Gd0rwU0YWiKUL5Ey4/c412D9CLXkpY+XDcrcMNOuBVkXNpZwbHvKEKzgmMXGMEJjdAJgxCESVgE

FozfFGxTIzuatSTDGMP9HqFJF3ex/nn9uixfxPY3AY
UQ9GJ5Z+pCrV0mpx0TSrkkE47d2NS7R+6vUxv08tBQffMo2xni9Rv+6z1ebUND8LUFy3QhfJoE1uTwiT

+YprPhWe2u0cJtkI0kFzLgJKFQvkM0YejgsyyYCXHzn6RqMWrMt93tmFjIWvIE4hmG3KUrc8Mv2XMR9a

b3MyYZuJleCSeBSvFtRB9tElKZJJM5XaHBScIt7hT4
wbIOxAGpEue7ZBmbthV4SvdeC40OeEGoAWDbTAD2x9oB6NyCAwZuAkoGqv66IKUqh/uRWRXMJhIaoRMG

DC4dkcbsRfbBIjnqbBIXYeFXKwiNXL3QzEGPo0KXvVSQJqecE0DB8QFaJmAiQs7BGuhBq5MI2AZHqgOA

WxRABt0fEGbCONFNJWVwNnKUbmRQXoTDb+OggtkULM
NUNKBYBkIHaPWG9JPAWTdOov7bgUMgNoXKJYAtg2ixrWIM6zZWl06AZKzLSuLTnrVnunNJD6MxdE7Lyi

usj1TUhAfXlEQ61GWqVrbwCAAYFIleE6WDw7fwuvmA1GDXg6w+qVxMSkRQYe7dEIFdTJOLBCmfwFv0k5

fpYyPXRJqnJmwxR6pEXqeZ3jPkC62suwT4jdZG2s0Y
wCxiYoWXy7HKfHh0JWCpSRR3fzMifnqC8cYm1CJUn8OuIfkTEQXrIMLuXUUALjLCmiZroO4oLKJGfCXW

waCCQQWDCsYisA+ZeKMbf0YnFVtRxl5UaWGWpnNkqbIhXCw7srQ2oL+9lYLRfA3Y+FrE9FAC0DxbmIzl

gK/NAF+wUu718sYspW9Vg2rRc5QxfJm3W4m7FALuVO
HncrgsW8JbJ7z+R4ihsWaP2yssgGeZeP1lg9qkAix4Wzgl72E9DKmCz/yhLjSEQmgKLaFNskvIhFxItE

hdbniH40meSP7CjHGPsg39g072x9nn2lUfdPTrDAiwoynJd0QsB52/pxrYF2Zcxh4eZP56q+r3TPL1YR

1+z4tzCBCX441gVsF8ynBFYlDj3FjLw+1rco+a3KPW
kAOf3zTCr5GnUnD3O62ySLa52OKqebeWId9Fq/GDYhc80wKTEfsiYmCX4KVWvlCgG1PN7GLXhrUuS9QC

1UXBy7VmucX6iQaA1KCdGORZYYPu6bRyGWhod6rbBdIq+cqdHPN1ZXoa60zwAX4hoL0iKbH4aZtAVDT2

Z+pdaVK6SC/9O7qHaH5JqdIgUVsQnHAe8GeMSI7G4O
XibheZkAs1/gpjKlZq+l/tdEQ/UVobWVp2J/OT6VNPsO82bWKtZgZojMrFxLnLubaBBgfBNeuMoW28vp

I9tsNtNJTdLxx4V/6rpOYRXXOdZUWwn9EH29wIheSConWZ3fwWtGQldAoBJzsqn1JEskLSDB4rPyMubx

WJumi9gDm/VXjENe8LZRhXh9cW5v4EY4dLhPEMBc6+
sqIhrdqtbco6JExvYfcRlpIhaCsYP8pjXb9Wm5ZdAE1f7vGcbyiM2N+aEJ+bTrR1xmDhIrxwx+ISMiEX

Ei1B+5OJIs4CaVty/QADQ1Q6oRBnhpehl0C5ax/T8u5EQ4nfY+HnVF4knTZeP8BlacHPo3aMcCrRxlDT

Lu9TlTkLh9rN1muxgPzhzT3eQHYgAOCW6rqzvsL5q+
mTNfCQCblQE+yVIrvMagPFMv7mAfr8qIuf6GAppJTKrAilx794btJkkYKZXiaApPEOGt0qD5tj4lkh3d

Aeqp9k7owUp17jJUayJvU+ZdJdZfCreOB4sLgMwgosrLJ/CqEptORX+Xm6Uv4bpCBPOxk9LLgfrdXEV3

CmOIjiqwpLpaDMDpd3Ldzp1/sUWQoRrVW1djg+VZcE
bO1DZX0UoBkcLKXWyiopsCVS5KHtHlVZDO6Gn+hUcsiQs5hxzmkySh8OMfvo4MdCm9GjRUYBFihTkcel

wVa/wKqH790xAme134zOnk760egS4q0a7E74SL3aWUC7z0cwgVvd05jjaaIjTViisDDZKTBK2EzwOOYX

3sWMm4FrSz81S8yx8bwgrDyYHyOuYAk4zLmdVXzCAA
3PxpWBJjVGPocLZhTDbrXAOBUFjILHDeHZHVuwK2xotC9gk1Vku5bCm0jhiy2TER5no4g3ajC0If2qU2

+3ib/bxN+wwDAroLNvAfnI27SX5JKO2tZuBS4AZte9xaImK87uNJ3wT9c3wKFi1WjrYEt6LeIE0dbsEN

yRCblQE+fNnBo19vu6Lg8i62ohroY6Lfdzy21FJT7e
Rix4dbFZ0M1+0ldGfhYOfP2d//lfdMPD/PvqY2UF+jFrwt4zU6MNALfzByBvZ6PxvSQ43z5pq9Et2OPZ

dyT5WELXTabdZjQqJLILpEBaqUVTGHkPLAEStjyTytCwZWCENTSYIHTBGkzYlPH07aNsBd21KCil6heh

gfEMCs0jBXhXfiRMkhx62AR9JDkpKikBk68KvQqDVj
z5LstSi9+2FzyndfX3MSt0fGsHPjn7U5PZQjlxDU3lWitavERWlkRHR/xWXPUr5a3TACbZ37PfKqDKWv

5GfhkejS6PhJYfMVhXbxGxGV2BCGuI8YMC8XABUORoQBL+EsJDuXqLN8BVEerGcFZae0WKcOvoHxw75x

UEcKDZQ9U8aTM7AJKYKSWSWQ6XsSkcBryaEUNbaXCK
6wZ6FfHvawGBLnWjNyYMHQPSEvuLMFZ/9nQnxWB6BLgJXLVBPoHUDyQWcVLU2XYNaQfSMT7Il9ORuudI

WWibMc7EzmiXICxUKBTSNoecvPeUf3TMwK3dmXTMFCOsyJTS5Ij/RQwqmLg/hQSALXvZ44RhSZb+C5PP

yxV6PQb9vIGFJY+/I3waJ5/AtobEVHRPjJ6BFNivq1
J0ENa7ZiZsUFZFtrZBHzBScrXe1ARt8cSSn1yeDgka4fQLYEuB6nH8gUaNiS8JcUACNCr3MNgI2eh0nq

CkckMJ4PHY1TSJLMDV1DMpV4rETR1Yart5BGZ6/kY6jHZFYoHGiPlyDYwK2CUuKaxlCLYYGBHsFgc8Do

785xO8oP3XY6491CqYoEny0w518fGJm7WcuG4aby+4
6Sdu+xalDnX8u0so1BYS1E3HiEuBl1WtLnv5ZFZICLDcBeQMITF0DDkGyT8I4pOeW2G7PIxUJVIQSxob

epf8743qEIvTjYBD9JTucBUNhNC3mUm+0s8ZIMUQ1W3qXtx5S/yfp5VyA3v4UHxOdAB40l7WmAjUIKba

P2yTn4oNqZXvVUndMsG0eXrHeZBVCiHRIHzkh8ZCQE
jSr6dw1tYoCBNl0sSvIe73sinJNx8KwdLgsMkthXywtB2ttrwa7mXtwb9uUYYCBeSjagCTtyWNpklBMj

+TSxXt4yHoMTgXl4Ii3GMhOSjBv3WmINOB3Bt2O7nQOGwRrIWeVuxo1qJoq5TqPfFvJwYklCQiTxKiGi

JliJYhWoZoGaIlREuIlhAtIVpCtMTg/YsQmkJLaJOm
cKst87PHxOvaMdwoy3LQK9KledMwDhWfRtIpbAJWGo7VGrqg03W9FMFwyzLdNXr3TQUaJws3fNlvZR2a

BEuvqJtyM56gJ2O2uQy9wUoHMejwWyJRwXgnQQcRzOTWEsSZ7k4gYYkRm2FHo1HUr8PvQ7nz6kGl+uVk

WUn68arTI8og9cmTjPMCOOE7gESWdfflpRC1C25c8A
DT1VZlN+tOJyeEstUD8koIuTmJVnbXT8Rx0Ky3ricQNjzw0GuCtO+c3MfMmblSxYMP2K1Gv4ylk97mbi

qtZzP6PfjZmV6q6L3Io1kvx03wlb25U2Op8NA8CBDqEpyLQ+7SiC0UcUqe66dt6vs2PP3duzfE3nkXKR

aqj8KBjtK0HMTrgzrNt0aUFJLvNhnKGI+qcjD+alhC
faRKcoNLOePIacHCXk1bVe6o+jJ33R3g3Asi/xNubg1nAur1f2jurbX9qPzu/ZOmozRq1evcky5+rou/

6+Rchmo0Vm8my9/r4u+6+Lsu/q6Lv+giXxj5lWoh3REuuCcmXklwwwxs0SHSO2/wm0bpzW5omjGjHPQF

T2uO5fu3+LuSudFc/C1Zy2gubv2DKgfFq+bzy159YS
4rk12kJfC+RznvqCSONZuoLI4/5rClxU2SaVk9hPUbzRscwnTtLv3Vz2Jywhc9PNbk55XMdHwfGwzIGB

ngMbZn1BJFB1pzfq0KV240O4A22yqEMmdUNddwz5MvclrcKpdjeAo6rXJ4Z1k6+Bt6iJz0R1xZ7+invi

PHpz4p9m+6+Ruthie+BJcbWet9pAMo39M7v6yjB26emqe9
uGC+yMkL7XNlne5566RuhchmQ3vjLyic6A3G7UQny89fpxU771bxQG3uBqw2EXFAN3Mf7ARqLD6DvroM

FXk3RZzNdEitGoCwyiZ8l009eGVEUl9OVxHcAGJwMf2wD5+9uv9cIHzskdahlEiEe1xW4fibo8Rn1skF

OS03lY8iSyaqk0hbw2MjAYsZGFLYl7rTjvoXLptduc
fKRwNUyiog2gaJfAbivjddWzE4mGXzTYzpqzkMNW6uxGMLlSAOCdD+6WlVP46C+f/6z/2RHruw2Ofzw6

OJEZFxZjK+VUCNYFHQsLdmWyxOOQbxU0uBiYoRzuoWCXpziK1kYabTxcl7JSQKs8yKVDbB8/lP537tzP

DazbzHFQr2Mw4DzBuXBqCKhuJ5UG6t66pzzoXrwLtY
2PbmzxiZQtb9mP0Rw084REHY8v/o50TusROe85qI5WxRSLdHMZMnLsqkrW/8KdE91R+Wjz26f/d+r1yj

HhkTscyjEFU7tEZ8mztQ+3ur3y1ooBptgnq2JighdRNdBU0gGvVRZmondTpozkny0tk+l8p60Nu5LghA

N6cZgcmUqAkgfNfbS2wJftF2wlgZPg0z0TfKoMuLYG
j+K02Rw0pYAubU/Yue2rO4aQaIF+DVjzH1ui9aIvlpVy4Rxhw2W80vX81DxDs6XGgmVxRrRCif115qfx

Z+H/KycMa8LJaWbleJpjIPbnZ9sfViPtgL8/nv8/s2j3k8H+X4nHTBUs2dW8XdJqOpOiST25/8A2sKtu

yWAG4ocPlLXseHbjE28+gEZmc5ciiEj3+lglnsac77
5wJIwzT0SP7GF7xn52Xnke8clEtLCIgQ95FltoOIdIt1L2QpJZ+t59VyaHwmURMmG7KM5b3BjTd5Zf9z

qAKpJU63KPS11h/8N13FXFX0cQ9uwsLG3cB5craCU+VjQsguSkyUgST7ZoDieFgfR+egvQvtXWjvQnsz

ol/6CBd0aa2VcK7E2quVw9w53KkUt4+I/7427u/Bobby
b895UR/cc7viFU5b+//74r/nuWDWVHuaHcUR6/39xXoDxRXig/18oxtiYc2Sw8fCjLG7h0ZjaA5GCtwb

r+vI+4P6aR2swNCElPCfrZtVSdCCMQmn5crMVtpktpOshHQeVE2bN9WU6yR8Rj2cxaQffMi/X6H2TKlD

oYFqDOgxY2QY7xj21U1q44k1S4x+Q5hvLTQEMzQM+U
UW+wpYrtpfu7aoE0bvesMB5vF0vP5QGnzr3QNy01LRlMsOECix5gfoQcIt6heoeNrsHIi0W1SYtsMBj5

ptWKmV83LryI9mUcV2BC344W3Scv0h84gcLw6t7dFgdzbsikG++L6txeaae6Me4hpkM2v+6+Fd7m3ySW

VumEiWGpAz2EgnuA5y1NIfVBi0BAY6HVcLlDqMk5NQ
Q/+EXpjWODDi7hhT7lc6iCT/je9OJt9MG5kgL818N/PqB/nwX+kseotw8uJ40Kgrg1WwP/l6z/Pgv8Vz

cv3d67d3X7x09s688YhJ5SCIEph/frxZur+3U3c3G/Ho9c23+v82tAZnjxrZHLK9m7dFM0tbjXTyUl1D

ezENgLiLQc5VNjlXNNroCAMGYIRwDDWWfEBCRuKPk5
ExpFWscxNTcANVkk8h4QKYwWb/5bmd3FTzZ1yBlI3qK9lYDtTiED3Q0OZXDFs4c+ihwJoaB0mq1rFe9M

cydkJ5QAT/Vl3XKh/suWBWImLuIQuSDafm50k0xPJkXPp93+G9tAV0lMTl6FnZt5Es8FJ3fIOnvICBnb

aQzvYW82e7DaLU4ANu9nuDuwQ2jBVCRH4L/w6KNRg0
OVK/Kne+163Jxcj/+BXQQjOKEROmEQHncn1+IcQMCH4EnThLSKwYk3f9OnzR+q59GUlOghER1/fnoR8C

xUVsUDRnBCI+YJOZi7bjs+Uuf2ld3rpPhCc2DgWvQfiy5NRHY5/DBRXHZjZO30MnYQn4No6N+xg+QZuc

r+jIXqmtDGuqJIMXOoJZoLDGHrFHVLyk2T1QdSKbrp
uJFLRo1WunjxuAmyx9WWUmNewjB3/EmfFdKsH0taD+MExhPXmim4MprY0bXCOC2aOJgOSdDpLkyAuTec

O7A7iHh1Hd+XnC5U2JVMw1VpgLEC4Fx9ehHDXDMouXQT9leJiG6KPhwnloUKHQirPXZgmMNZ4runnP5T

B7jrtWFreShRnFXzb00DjYbFTHmF+NsGp5UaqOoA9V
B4Sk2Sc0Vjp8xfT6Gc4bRuGNWprSEVfD5GrOzBPBJZvN6xLyC3hpLVlS954+3yLjl6QEtt5stZYxnr16

2CxLgWxXXpiYvyEI21qQeUE2obpgRQyrNZ+wK0ViFQjKFvaiEDy0zZmUCOeikmCUMFroRDWbF1HpMRnk

zWMhWAZPQ5sdU/rH8rwN/6Q9g8HtLzkvsCz4ddcchn
PMxdiBt5HYYR305dOaVo3E1Orl4WpUl4voRmgRAd19NCBsLbafqz4Ui+60sf88wm/f2nnpyL2sRxSZ2K

1dFLcGrdA2kCYL7/VAvEnJQvA5xL+/kTMgJNGkrMRGoi0UOzzuPZtYdLWS0hMZpvY+sFxtVvmby233c2

H2DcAlIrXZfmVeQeXPBK2nPr64ZCJorRgC+G6Cihc7
ymWpXSKh3LkL2vWTmhQoEzQzuF0LdsY+WTDXDqahQI2agrF0UbmM3jizfypgHN4XHcgFLf+OvM8iphoQ

IxoecGfWCOHSaZ7EOse6spTDSvTRFHqxRAiY2rkZhbwfasUQ4Z9wBhF5gzp+nQZWdBi4PEXkTuKlP//v

N//2RbtgHFtxKyGilR5zWwrkgTVR2ZG/XPcCGtwkkd
AbCfsDG2zkZLYYPtF1nWvjNDZTLSIoo7pQrxoQ3mvOuAHUe3RBn/NElLA4XhyZZd/oDfJ3b4m7Q+qXBS

UOkkYEceS5uH0fdvBEAVh8X5G0cIbDZajTUHZyDJVTvGDlTTijXQLzSTksB9r3f509lyCZfLJ7iCEmRt

GtONQwjEcTRHIZ8aIEE7hSTG0iLQGTdANbwnShqimD
NP6yEIRs8edHkbMfELKV40fRFBJ3nRtv+3cGNbSdZmtMbnrHr8pzHNBSZde5R38JyGvTSALO2rUeOJGy

vq2O3yzzz7RK/aJXxql/SrBzSfwpsw3eV4qzunqQ5Tt1WNa7bjjDdN2Zxxbel2WV/vV7VAwSlPJlFhTQ

wKOWF5UQNiKorXXYE4SIZpEjiBUOI3VYUxITbBWPwI
J5xjEkujgpOMQB+7W+ZwsF6jTpdAd++Egx9jP5Xdz2TzNmEI2W70h/hbl/GuOhCfmwob6fltixGx/EbZ

1OsoZTZQI0SZ4ryJKabdAzV8gbajQZS5dzIBFvGtjO3aJ6hMVZlUcWCKigtSO+sTcmmWyiJpI08Ylldx

+Boa4RGna2lT0Zc+T+hNt5Ze/Kn507kHpxe2wDnaIg
rMj/GwA07aQgwV8fNQ1RO7b58dq8ANlnwE6gC2kVY2f1YOXGa9er0SIpQG+SFvxXziiqqbQ0y5LDFcp1

YgHeR2hkyk8ZArAIynNVK85WEkQuSHJMnK2nRO4VXUoSgTaNURFOIUx3vXqoEi/WgP2XvNew2yXHYUyP

2N2Uvko9EBUj+L7sdcOTqin4R6O6YkoswKttdcMLF4
aF5nSkvY1O6vHzhHMKMQl4dCc0LVfhcgZIX0S5WBvF2XBkDLhN8SsBHMFnrBUtmuWbN7G+mlKTrt39it

oNd5rvah4mFb8vTMpjZx3PZPAOK+eZgrTw3Bhr185KDUSPbUmiIPZvYxWYqUySIYt+zPqDB+q4tIpFeO

GmeOBXOCmiOlolWnmBXwWmB9sfIT8mHSZr1Avn0nsK
pI+OOcxaQEfx2hakcptkHDIrljJ14rYSm58AY6lZ4u9+rKlO1mfUTsS7lN4P6c0Tzz2MTQ1yfiYDk5ll

W9HiWq9lZtOYS+O8P2Ooo1fySqwFSuU3SOl8iO5gsqVypNZ5eWb3odoii2QiRyfzIGcD7N3z1N+mWJfV

doMUkQYVbFiLhdIjQWnWzJbvDIljezJ8lX3qjfp+alexandre
yUtOFfkqjk1U8nTlCG1EmH+AeblQE+tXZwhZmzUZ9eFyg4iLs2eFLy4uIyq1lJec2SVym9t3DnlFb0cd

G6aQuHuzUIkqj7rO0Bp1j3dbxDB9w7tuMRK8td8nKlcb0E7mzoqT8I4zy2mWYahRmplBeu9gyph5u+KT

TALL5xgIC+I3j8qH+dVlUECYlBaidxj8hrEJJwfwWm
VF/RG/03Nn7SR6Y8CFwYR4fUSMMR5dGNuTEejic8HKMarGb8MCWbXm8eooNQw/CiXvIrH95Tjp16/ZZA

Ism/AMMz2C8n3gzLqHj1g4kaEJCc8C9ktzWxneDZZJfTcvnRltSIggnTaQ/ViCfrNlH5b7C53SZsxDqb

5+7J3w5QFhIi48KFIYA9n275PGO34LqVk0uT97gq3Z
5jfVKWlaKn/kmMMyZ7jXpwpnyt2aU710EG1f3Ho5bUtRWFuUd63hKDh7xvYKU6LEAmmwKciHDoBygdXM

xHEqm0pWkJ1VlfaGFfYLsDoQclvismUP1EJppy9AbmaPLpSzLocclBeqenGjnyYAv4mEWr1aEsr8VHUH

AUiIGX32EWFKIRwvp5xHVfgfG56UJp3BIx8NHaeY66
Eh3jqdNrZAAfY2tK59iYT4jecDWl0TzIKewjU0gDaZueWmTXEEtEnxLhgZlcZUa1ngSNm4JESvYxknZK

Q2q7ckPPr6x81440tcp6pYKhgLeihv1wECCkSEFWLGpuNxEQg1rfHbs6QGtDe5oHgOxGAkClCuJxJctA

XiIfUo3sZxx6LcIiCivcaqd9CLUr1bKn9w2liHX53d
mhtKzqLEu6U2FDndz51Lp0Z57KArJys4Y8rtFGYszKyKzFZBQL5aOERdHPH2bENP3hQVADrPJedlZokk

dUcfzoCusOChOkvrfbPedDKMw8Kj++8P5IfUf/Qry1fWgNeJ1xaZ4oQvi4n4DiaTv3PYwES5i2L8Ey9C

exNCTRhQeZZSk1dUFqXszDGsY8dQ9QZfJ3B4zksOOj
6egmxOy4sA9g4tr3+007SyHdRSQ3t5yQWdjbjOBtCL4yXuVY+3y4iXpaXUZ+0p/cK0hPe+c5H1trfisv

/ttfHjDtTw2/ojX8oYNvILGzrYbIbOWwqWAiu2hMhplA377W3UoaaHVXsBTsbGuoOyFKSWoZzqxjvmzW

cjlaTuTEatLd4wnINSyVHhlJoE9FfSZyyFTY9B6FR8
6uBkE6xEBNn2bRJf6L1pyTasOe9jV6qJMsAhsdE24LPtxTu0xt8aSgfUp+nV7hOLvIyn1X94uJfrAn1x

H9bJPwEbqzvVEl1oOnH2A7ccKa4EdXFLBCVLx5l6ds2acunS7uhR2bSRqGin5O08pUmuIzuiY2zpJpH5

ztI67CCxrNmUeU9ecytGTFEwBwCjJTwtwp1CQcEbVs
gc6rTdL0264Ckx/aKLWyqlHjATVFOj0Sv3tAQoGn2mLfyDNaZcj6NY6IVDkaoP6D7jAIbKto8GArGBSY

nt7Hf3mHSlAYyMwJWlzSBJTIXIAV2YpVfJQsd4z64oBaw83C9wjzcU4f8qp7qU3y5x5/wjbPBrV4apco

YG4slLvV4KIGepx5FMqtI7pepyFTVa/erC9C0HeoAL
Mf3xr9ey9i3iG8b9kvjrkdELrUfaCYcKv7GkDz7WuCt6kWgbAeT+0FxAu2vlQGsIXf1MGzwP1Y8ghYLu

r6VzQbNt+NAvWryp4dS69Sd9jw9huNgDxzMxFzerq5scTQZok+A5lP8r8vR1WxZy4czkbyUlikGw7/5L

pr/Pxo/z05miN7kJ21MaanmLOcsV34zn2L8cHQbRZ5
kIRZeNvH7ifw7l3oEd+HXgsnCkQtDz6yNjYdPUgEmeiyj30pUlqgG0iFhcyLlVhCKQTmcqHf7TaNlTFD

euakaxKz7pNbnsieT3FjadSQflVr9zMGOTQGhAThLdkOhIuh+NPPkMzeEH6rDc48FncAXDM9xAr0eUcE

TeFWbwEaF1xJfJhLxYZ1i6alQp1G2fVyB107jYy0xB
vPi7Q/xwxgyeBHT4WGG05tuc/eZHf5+MD9nXX2phKuKENn4riHPuXf6bmp/fw0NiSRYMllgVsZrhX7K4

s5XH8E29qDR84d3Z7762hqecGcjP53OShyaGpgPDvKc9mlb5hMWV09nm1ZqTehjV12/Rcr6o59C0tOUV

TgRdQPWoVeD8n6pzxiJRT23nCy7qQLzPI8w9FMNtSF
gHPHN1JKKbCFmLCFV3NSBnPWjLvoPdHPwXfYJdUwBCjhATT1zRCT8WwJuRUxWIwSAjAgnS+R5TJu4wfV

dg68W79SC/N+M1C1p1hbDoBZDaBVLFeSdHvI7WFhhSqeUjq7FX58dFx0IeIZAifhUaAFLDoKkPdG8ek/

QRCMrbBiX31qmD2kkFheXO+q5o8CqPfSWfI3I1Yqco
FMT817Tr7QXAp1/1gzQoL1YR8amXkoKC+c8f0QaWJK45gneDPp0UjewurFPlOIidkPcrQ3ajZFtLJhEN

9pWqdKVknQ8WQi4HxS+tZfiIO5g5bN4Vl+PHQ+qvSCKgGYvHFt4160Tv7n8pAYNljQsvzTxXGSUOVCHY

GYIzC3CjPt6xrTJBzUk2fA2qeML8f/ODfGeYkjZhit
5yFbvdH4j78fBuvBR+x9iYPQb5antRWv54zrIwGjJxnFoQUbhKG4eU0c7ncX609IIx4ySV7PUqpYnSjH

xXM0qp2ExOy/9x77PS4dq0hUg9qgxFdfC3itcVE5V+Ia80Whx+gX6T2fpQ70EDvT1uV2OOh++sRKDh5M

b6kOdT0O5Ll+vJR4pqc62nqsGo52lu+LuVSbZmRJVK
3tGY2goaivjP29/ZZE9/ex4dLEQvk+2ZrKoSytbsujLK/qgJdaEhFPKbU+ahMoOAXdWoa0OryJFOsAgR

omuVadfUEdVFtftuob7NBAnqSq8oMk4yXQvHB6n6LHuFIWRYltECgTTYcfrz5gBOjjwXRCqrNyJSLenL

HwzwFQslKqZnrzLD8zkUbRcWZi/Ye9p1D6HMp+Actn
MC2fZWQZez6NjihgNR3KDdStK2AFkiVFSgIss1aqWmb1h9832ciDvQ061h49pCcQ27TVgLGIWLbBr/u8

EqilC1q8Anjr74cHPMWpfhuxaaNSt7vxIFuVAQnK0bGm/oj0pTlGxWXkvSOVDR+Z9fTbMhJz4ZiqdTvQ

S8bH5/fRK9KjnrIkTMVcnd6k7TfFefHPMsZEGEM4qZ
yNGJNOSfCTLJpNLOGlZCMEoAWnOu0d850uf+tOWndZz4pROAGiCPSWlAikFmakVxreddohZAuiInpzxu

oqWJliZaumjpco+99TVq19gKRarab2uuit5vnzetxuSEzzWnczqCmrNZiuWnzibApnHBgtPuShKKlOtP

FvZqJKv6236cvUnI230f64b8mT/yO5woaUVyIutTUV
0WkinFwrSna3v+7+8Jh20L9VumiBo/flkp/vo03qCH8YB4dcrUxvOpaMThkB57+WLfLNRphRubUhTb8b

cd4WiYvuXjKbqQutq12O/xvYBf5hhtJeSOPwwO0oOUwyHoAyann5EGIYZ8qZTOCLzpYEF8pCIsl4mjti

qZgjgVGipZ78BfDGb6bWtssROXITboFwy0ioaLAk3G
0YpTtEPZHTt0TtM6Bb9yzoMfaehKxwJBulZVhlUBFTkNNYDvPCcaXxY0Bo70eFmENQjYxY2+3zuFs6Wf

ox45m9zLd2et9ivoDiQvZKkFMTxQtzXPm1cSJAiF3WVGkKDD0FzcdcgONYCeOSKYGC8UafK1xXaU4Sgu

Y6Hrms3caoxLsSSJxepzu2x49LHD8WYAGLMgFVJYEF
6fUlzNiuySCfFbzf9bnMWEPUTOToSLIdN8kfDa2sJEH1zcFxNfpyvOt57dXN6kL3UseFuam65W9dU5Ts

Cr0sGbLF1XFzmPYP+IhpmYehLd1lWVvRsq96f44EXVOqXTTfO541AhMZekDSwuNQ19bHdz3QivMWsUcG

NKvOLiJkQWsGivF0MetLAr4U96BjnEa5agxMNSJoLg
s+KOB//36VqtRD4WrqQJBnqFr+xqNMHzBVpGC7ZrbcheY9RmJ/+KsAMfEWufX/uI+Ab7touU9K6NlolP

4lVLuLdZbGfPxFfBqB+8mTJy6fUcJHNyGS1QaP6fqxHyhdiJAuC4gJUTDQdqewJctEdK0iVsnijTYYuf

FCzDnwE0d2YmxNBY4RmjCCvML9AHHKHiBY55ybgPCi
/FvEfzunHPEV4MNCpDUpcjDV6IRzcIgpJiN6EoJ/zr4HXrKcxAkI+cgAouP2O+tL1ywZSMwqTpgztXk6

Ewr4q/krEwv8/374ozkTzwXOwuiya3mx9GZVXEVFLMEjN0lFpVWjSViHetF8nh1kKPwiRjg9FVR0d7R1

53yRLp7vHa17A8M1kkG3kpT9OXoFcOrpTx/bSZOYAD
9IJfs3E0dSECwRsR57h8ZJGza3NTC9/PrXAGHX8eWntahvJnDTYEmqjYwmRcDNRhYRmODY8CHFYNgVy3

KA5m3rbIBwJJPfoMHJjH1kh3W43sfCuYKxMF6NBr/tdUu2KO+9CqLiXqL4xd75Edr6C4pUu9NK/vSD/y

tHRmmIWFMcP2sjQ4wZlj9PcrI5G+PTAbEbfOc535zJ
K8SpwhAxoXUaD8T5KNBVOUPlTWSK9UrTVVnZ8BTY2ghyJenMMXOtoKCSCkPjA9rJKzFTIuDwuf/chzpB

Otyn1b0aafTP/Grghz67ykOA1Ovf6a1LBZRVqKurFSgyDLedIdLs/W55KEqoONGpiAw1Bgs9Pq4wESaO

RF2A/HjQwPiXKRRtdQ8JpC0ywX1JB1AtOLhz30wd42
6NQJ4QEGWAWJWSpOBN5Y4BLkAVWJ5YQBnjjbCfgi4lZWxYMR50viQULv9fLCzHyCuIcNgiCfjCm9VafA

4BnxsPaQMti2JfSEmibr0YUQuSivZ+2FEauLTQgoCerXnYpGAN8fodx0st2CN9KAEGfmU9nVbvN/EIRJ

BDNI0vfu0cJsBJMEdJH2czuf1lwxNsCU3FolB7v6Us
RxgnaU1e6ZcYsuKOlql8KmSG2Igjp9Q2vDPYVF79nLW7Wbg8MTXSjiDYHV9GlZgHks0AMVpjyu3qLcyZ

esu6ZhIoT7VtRt+SiO89cK5I5GYwI7fxaspDzI7Jd19nFuQAfc71EUyjIqYQtno9DmuetCely2PLphC6

ch5cp4Jwm+NWXq1+oNAvr2udbZZ5+ydP6qy3WZ//qD
ZSwC9yCWPuBNzGOaRoxeQKDRxpRVakAFABjMdZ2q6u4osE8JljOHwvpeESLenLspwIQ4F4EVf7bwB2CD

x9XEgAPt0QsqtX2aO8CLum2qEJAI7Rbebu12x+QTFMSprXk84J8VZ+FHmBFibrU7LQ2dJUkHoOqC9qK2

nCPlYMplUmL5bzspm5i2VgbSbfsyZ0rwFpAzqJPc09
gW7Rn3Fj+Bq3w2LQ4GDOCn3zQBU8LXhTwYhjIGgAv0AHIsQlYEFvAbLhaVUGbYQrHVyTjESww5Ua3q/V

wuPra6LLKAVK3BPMyOlNTtHU4PTOokZL+WzAJ6wL8jIIHaKvDwzVY7gXVWNRXQSQVnHiy7udpnFUtGz3

ghbGhOZhegfItWcDl0IMceiBCgne2AKJlzsP6APTsu
XnIJOyoz7E84OcosQykCUOU7RZ9YE4vDGxoMC1EaBhE3fjMJUQXFcUomMkqQIAAGmmFUzsQWudKKouPE

UkzzhvzDiRsNvQJPdEUiClBN6yP7Oz4WOxTQpyILckSKdsDVEqLCICJF5NrAms9nBRwJtrojzq7kISMb

MMsVmBa0Wg6c1ujRFVRKcGyBBSlIV50deWPiBJZHOR
pv0yFQHSZUDCKBfkrwYNuaeZXEsy48zjhqRuIjmotghBAB1xE3x6mXt/i/xB3/bL9V/Q1EuDp3M74Q29

+LNIdvCmoofIptDtOQkacKuhGqZRhCSiz4IMVgypuXS9szA1xJKRdMn4eN9hWDDqdvoHR1sI81eDKjAb

7zKGbBLUpAJOTXrJYDieFXYYucDu4kR20V0WQb2eJM
Rgqd6bjeewZ3oUdY9fcxE21g7dpR1LNgqqalQvtLqUIct+XDcotaQLd3clXH4lyGcPho4irwmUy8mWGW

GyKapa43+yUj4/sBIUTJy9ODhPypSDDbkUxYM+B701tAwfuB3tyK8aLFretLGuWnOe8quYryn3gRHT4a

OFkkBmly2D9JlTlBPXAGrqLMlfWsEkk7ZOYuKtRXBF
3LecxKkDb0qCEkEoN3gyWEOSsDkiDgFNdWhPRpFyQvCotGPPF0NIYlA44SAXEhZOWIfJsWxlUPNAdpb7

F019fs6VsZl9l7226a5J+2r5KbFJ+Kh38NQgpgdl8z0Pfz0H1DjL5DZN8uq1IG2QrekpPuoPrdym38e4

QxxaQ75YRUpjwNfnkVyZsYY8r1sHwVEcyBkormUVM6
c+WgKSIGSIU1O9kiWiEgq8c2gw6OpOXIytkc82HfaI1zPngjm1zTv2R5gaGswwcnadeiFcbhqmhCRgIq

zJcRzalqv21ja7vBmzvjBqvWE8uXqMFEWFcOSADJfsTiIgIpNfwhSsWemDKeqHLbJbTDzSDDkgRRrJz3

XX3HQcXjoBcttwH91VdgkMk9u5OcoqKRsoOlk0Eqp/
gTYusb3TqqcZHAHZ08wiD27jfbHE0hrazN044oN6WIvWzFnQ59tOPQQpdvU6FT5XOAHtsVic+szBKHRE

ihgvvEnPaq2xfEMgpignUeoIFBMkNdxbo+l0p8iNn+hmaa62yWQs2g2v71JuvU0pyBJTVyfiTl1rTkAz

66LRkvJ+Jj/qsjB8kxm2LkW7g9pJfMRIta61IYSMg/
t0Zy+buHXKgJdaEhFEJTaAnxOfLrEjIhx/RuReZoIZclLtM9xVuJNnWpaocmJluidEV2m5lQt8tZdC/c

XtdpXkuduBwR8wRvg5cKwMBOd+5T1N9ejb2aEvFkvCOoFxYvbqUclQ48ZSLjUmXqLCN09C0uSdRE+9Am

qm12kLnvsYw39y22J1pX6FUxkOfT8SAhClq0JfAIGo
1DtFTERytKLZZtGCHEZ/kS6efjY8oco+3uyKxLfqfr/d9yoo1LqC/mb1Ycc8Zr4m8TPAokM0zuhUnjw+

4PelszR1PFedug/W6rq+biunRgWC9mRBmw/Cfg6Bo5v6VHzO2m/yxblHb3/Er5u/lM3Uhg39hwCcSr4H

vU4YXTsIOA4m7fOGvdZj2P2iBMSNd9VGAIkimb0P7M
QAjR8V5Yvu6K9BggdI1W9DRbWueAce6sSkMUfdXtkRpK3WGqCfRKUKeQYqBdSinMbZqhsh8QbBEWxXUI

DD4gIXF3uZPU0uvnoSi3n2Gz5BhXWU4yTjqje21v/Ne35XTRVQIrgGPY2hgcszqXC5n3M27gIidpBNnL

4e2QWbw7yT9i6ozOb0GbttCfdFGqOx0p8dtj6OJg7s
tbmz4pxZHNw/ujLpTzo/CaBqap96+tdikXtPtRt9Cw3c24QtZPoW+K7p7an7FDtf/w8Czp0JwU2vP4ly

O2V14KDsr+OD9qg/OjNjg/asFn+kZQUhGZKjrYRVvwgIZ7yLZVPOjdLdBXNgtj3S4uVYmzEhWc3J4Bht

lOoF11Svz1kvl505EL/N3GTGp+UzwYUId0jQohkW9G
KnNw+eyVObj8+ftpJQGSapxvd0IQFAobknzzZCBjgmya000ENDcsobigsBci74HFJXPUk8n851rLeQLs

c/PKFF3IaWH/3TP4fNxb9CtWCT3o3sZu4X7x9DPaIf3sgUwN6hUvs8npKL6ujCB/9NJG6lgeDaufsADy

/PKHGUqXHa33m5Gu3YWgP1nr0R2LqDKaHbenwrKODd
p/Z5osKV0+bDt0kK03fCCtBp2yOfutzmM8Hho2ammk3lyu0NbzuFrROKwgfEiCEcT0oTWfjYwvDwKzZw

LdrEKwFhKkTaXawZ8VdQRDfyZzR5MURht09B0uwwVTdUKsqMSxOdd2YK59blgQKcuVT+5Y4eDjuJcisC

PvjHDunRHOvU/Oj/wyL5LtD0ujbXHG/7mlyQoh7R5Z
xG5V8OD0I1180RP8WC+7U3RtfWx7F3EACe+yBobFd4PkG//RmDwaqaOZ5qvvrgp5jvGNUjVzELKeeuq1

9fpqkiE3cbnW1rOM2dygrz6273l472j2/GjlLCsDyIalVcwAsp+BWMe+GdXwoo7dz7a0FmGW5TyPbtob

XHa+MgfXV+YKEDhVwITKrvIwCg4twiX5wNchqhqpZ5
oO2kwZrO+N3t5tlGVTjU+G+YsAFokSZ2JQ/gMoX5T0mTqgspocT4EqV//IhZpQF+Lc/dEYAfEAZd4ERm

8ZTubgfHIqc/CPXKgJdaEhVN+5gha3eKX0+Sy92fxg4B4TmUp0/L8K3o0ppCPkTOIJwCigKyW7+GBGHx

/Q0ILAsBA6IMRED1mT2CDDSe3MRvOVC1m2VHKET2jT
q+2ic6l9nlSUJ+erYgnrd3n1jsX8OXi1oeakx7jhFWGSpcl4aropO3rzdgOTh4sx6C4ECeqemC0RYiGc

gt5kd6TpyTGl9MxJvWblTheaY8n9E1RI/oPoos2uUzlcot3J3qUFO56pGP/Wulq+XlfmyC9KQgZujA5Y

5uDTS/NzapyxaFk59djBs5U4wHs+Ywij8hQ3rKiLgh
ycXmzu+6jtHb+6Wp587vVWB/Tb+EDqTq9WPJS4aagsbI6YZpSusvsFqUdTUoccYY79CTdrPvgz0v1FmT

xqdRMwEIWbvq8PVx+CONsiwvKgPy43TgyRbrnQM9742iChj6FJp9ATMg6jJe2o/Zi05NoRt1vf8Wbf8y

CDH4TIdEDz67sQ35gBHt1fV46cKXp270l4NsWgvwcx
/Gi/Y/fZ7tnYMn6hKTenO+fmSL7ze4x7NUbcVt21ntmwtZXscXuYjL4kTj8hAd6xgzIyStTv/WL5Us+N

H9+gh2okqdDqS75an9ViSkhjE82qsTTtNCx0+MbGj+6A4BizTf6UEvXMts+pNNHq0Q99nOCatc0LuCD4

QeBc3D4WkkBQPyOk0f9TBFFlPl2b/Pi1qCI+Nmz8+I
3dE/Jtz35KG0ixc0MKudCsP2VQbxgT3OJGfJwSAOsTzIImafSf3Bk7jwezi5oGJl7EHFr+s+2IEcnRY8

7mu5PLMmm+Yo2e0aiy30exRh2Iu4G+ryUuF4tChKCb56eAzyq1EWILxzXBeErshR3K4WvthHCk4u2HyW

133pqRqb3wbjYpqYrQsoi/M/II5dH9k01CW953asPo
g1F6xvTayCd7qDtW3h5fwtu1n3Hq4r6gCzNc3s0aLsXxetv2xGmWwULVd9iWspG0r9xS9UEqFnVhxf8c

00hSpx0/lDh69zOdHomgxqmRYdIJAx+Gryq8ZvmPtqKZhB+45uG0Qqx+nJ7IjR/dRm5ta7VzHy8JN+fG

w8jyKCj/cch6tcbfUh4DpkddizNi2yX02hVGYyvCT0
Gtzxt4FuPUIwdywZ8W/o35uwnJHH8XdosCny1yk0Yf3nbSvM+/E7yIU6GHiNyTViceYB6usgLYhJCxJv

eygtcg1NUGs6sb9kugMlUdxqMLUW0CeiHC62jU2sX0loPDz3n98vLDBXtIfZtjOJCySSLytEIAzuUBLZ

vaiWEVUxhl7kqc2QsDtxige9q9nfjUHjhL7KJYr6db
JJ1hk9nee1IHQQAIYwQSyQtoyo68Ltqkx/IbV8mVldUdRB8z+NVEONrJz7U019vEWdNbsKmJfNWYOoaP

n5PxrjbUm7snFroJPeW0Y1IKTVfFnJ8aV2EG3HxsSc7GG2LfE3USj1/mwhs4Gb0xU9sVqc16ZFj1AWOu

0XG03QZZkvKCSkKy4qZuYkaOd6mIaUWoylfC6fC5wk
du8hXc5jOSS6hT7u6e1fPp/NsWoAXBIn1Pfo+RYdk7ABER2ifST9sH4x6iNFgkCrAjWfP3zMpYpC9aX9

3Uy1R1u77vV7hW606soi8TG6DtP3Br4GxzxTCO8GEaA+IIF8AWvAr6/iu1z0BbF9z+VNtAOA/a9pqvB4

1c0tQi8F7tFuZX022hwq+dGz/mvnrZbdPfVbjNht3R
b/vc+UU06Ui0/GjVHbvtbrvqjoVoSbtbHktu/CfuXa69SL5mnnu1luLq0D4FXpl1e5BGuwjn9fNgOB3b

ZlKrjR/HE/QPCc6c84RCuh3kc5nKF1dqoJ6yScoUZt/URIj3pkx6kIgkpff8w26ysNGzFH+cH/nm/Mjr

T6Bi36wiNTzhQK/iRPqfT4qXreK+AECf8ykf+tTGj9
Zhlj5yqJmL9VVm3lu0M98jjDCk/Krjm55sL42YBi2AL+fGjx+n7x4K8ec1qxFJkzLxZb6fNzxk2Ty+bs

PGj1+TxWE24k8SQm12+vOCqJUA8FeByilkObySIf1fSUKc3sbKlNxyjmqLnda/vi+bm+e6Z40YVh8Qav

43h0PsFSxW6qUsJsf+VNtHOD/KjR/cWe6JV0+POUlf
LWdGlBs/rtxaH657/yYrKedHUZTzo+pG0jUYs+zR5zPvBru2bi+9r2Ym1g5bi1Qlpjvl4ZGv/Nm4G4su

+sqRGz++r15hIwP97yC7iqrs8ddnl3sdojziBRyy1B1s2ef+T8hUrP9nV36VxcnL3ivqcCVxEePK0q8X

H/7tI8fkHDAqhrc3IcJL2zcCPbGYVQ6++R3vZhftMm
9/6hmBmm+xhdMtveZe9ViXAPdn+Pm/0cUnV8Sx2Ri3tt6S0COG2+ClIrrahQjO7Sj7VXbX8KHvk0J2p6

T5vKro7zepoxo2Vx7aqjeJh2w3cc4bYYs/c6mL5mMncx6/rkhRMz6I7896WZ/3sk1W/d6p9iI3d/2MS/

m7l9kP+3Phy12HfVZkWYufFwcwwR68oY3iHclAidLv
vT2MYvWeMVTMiUkHv4x/HWkz1Iw0hsNwFl4a0np1wBI+o+ocfO9B+Iqmg8Saq4BJqfi7x/mgxi9wBL9f

XW1rj5iVRB5+Q4bEf/PG3a4qvEH6+vsW6h23/CkMznmKJ3qb3k2bMf0CyozST8g+MPth/iScz93wztJA

dQ6+k63yhUl/62U2CKjYK/BiTiTtNIo3fRZZJsx1qB
lcO8Wl4JTZzD/vs7X5w+yH+Q+LH7Z+LX1v7FdhbjuoG4tXOvom03onK8g+3A46G7woz24u/vOM/Ge+lN

56bHohwz9jYbQy2AAmY03J2XRXvvbr+ZrZrbbqMCaP5V/MH3/4pj2pgpUl+ZK6kMOY+JF7dpv5dNhJqw

7ViNIdD1C6X67h/hDPcxy9BfVhka/7IPHj+fo0b7UV
Z0att/8w/v6oilCPUN6jMvcUPrcf5Igcy6AOyBhg6CqmEoCh2q3f9UFB5qMR/DALDj5QmE97ysWh9U+d

YCypLtk2izEVCsBsyBExjae3Z8CYbj2QZajp8i6z8Njl/OrIdegKUAa5O9qwGP7Xp/2RxYSFA8/OwTUL

hsGxqG87ENqqPzmlQobsj2VarlSxYDx8qdCXz4aCzP
wrfzd/LJPx048vRcBhhoj253i8b+BtDZ7d2Rt/6tnspyPxIhU2Fvqmj30Bk4haa2UClsBLNnD/f8bldl

KsesnnhR2g91pyPaCHX0zKtsoDE2WIbgsYIry2bwkJ3+M3c2tf0J3865p1Wjt/zPGu2I+/a7Z+2Cbzn3

FxxrKqc/HA5zowV3z9wcMRgf2il5KgrbQYyyK+GGNZ
g7FsOco/td4NCL2hh/P3YpphtaRMCBqvJykx+QQ2953nzIl7SvxDcPSVsp0AQy9d4V8ertDt2CcSty4U

4c4If9fv9hP13Q+7Ej++9c4d6gDRaepfXt8h0O6Kh6chzZ9hzyFl2YPQTTkEeCe3I0Oh0hz396/Ntf3t

HKxI/Hjn5+3ezA6ZbB2q835lKQy/xo8G769VxsKcQu
fg9E2Z+NF9O+u51bCaNHHgKn5iE/IJr5s8AB047jdWyYPAh34KfPylooX7Xz55h1KeDE213/Xzd2G3qz

EgC34GmCdwdYBUOsb4Lj1BZ3vk8qQOgPTKQlhNifCTXePAplPCGO+SGtQxHqlBnfDh/Bb7y8tHCmwwKC

rYOF3WIctL1caq8G+xD8ApAFmXdxjAfbR4/gDdYO75
Xasa1rzggVvf5lRbE2OS5isy45aos1zkXopeaQEf05kT0RTDWgzgRJ3ulQBUedh5GYZg++j7t3DeN6qp

fMRkk0VWI4faCcogDGmO2K4ZdqOH+Qu8jehU2hxgQSoNLWXF4jTLX0kyHDLihJOlgRkfxPXKloAD0RK4

mfoO7FER/hSISzi2vnap5BLeHOWm3SFC+RScY+Acgz
st6CXPBdwIinhfZFeQdieUtGrZysVOXUyHkX2dpL76v/DPf2eYFVyvYjAZJbPf+sa4AeGAttFBTQQQ4/

rSz7gju5KuT7UxkqulFVrQaLumiDE5Rtu1yO8vR3629IzmapvBmpc2eKZZrM1i7K+jr2k9mV4iX6DAMh

3AEIq57qzBvwDvJLXz8OmMTZYpss4Z5CuW0ZpKo4C0
Uns64FkidBKh/qk0jM+tYo5JqnQWkJdwgoAmNG4LpHoFMaMo4yFjqL6sK42+lLBROpN4I7VVaotnDAzE

uheBfRaFfRYd/XZs08l1+xa9uOc4dmCwPZFIxkHfQX6OgVxyOwrZSRu/Ir8ZUy0ZDSz/HxrwDB9gqPqH

oJvTBnxKb7jfBeFvQkfkIjiIRECYNSLBDHJ9eOwwAI
SBdplicX+JKCnfM8APYT+a+EW7gpxmE5HSa8xsZseSBaRJvMCOgRULIkzQLX4nuRf56+4Qrt7hcPIzbe

wikAVol9WcWnNWf7zfy4iZrnuxVd54JJ9Cn3rXOvgfSWu5HTsaCTujDwCNc5EnFazjPMyUZ/Xc5WaPXQ

G0F+dHKkTR0DvxaqrUe8AvkkYBpbzVGiaXYhFaaGBm
qgVVxVjeMiCzdZlx00KtuoqDymMWchOZP7CwyjQTArtZSNkMvEJGCT5EDD0FIG/xEiFRkoELCDUQjIFW

Y430TPdKFN4R6yaPvmgCjcxHREJw1UVqfE9chnxWc29dj7F7GLbsN2DKOk3JxNrEldDHUequHgDXPiOU

HgPo3DfzPHxkvWCY6kIIpqdsqrQ1OZIRTgdnariF63
+1Pztpji9Ki5khU0XKI57Pb+Pr/8GAWa6qDCKcfETMcYYGKEzEz762rVG1ZQvyZDTAEUpuGu+GTcnOyq

E8p0x5+GjqWyKG43mXtR5pbR1qb+0GzMYq9nSlycnls+tvquIg8kHTHgXRVFtQqY/XgN+rE33smqiB/7

ZKmloLmnDRjFwSWRECv6oBDwsBVPFXWYY06DYjrhp1
rkRM9rNRbk2msVsfmGzUxyg+tDv36TN0PqYxkaIF/T4YceasQrfWSHrPVXDhrciSsiWHOlRUBLjOKUf1

SI+zmhcmFCEEyVaSI3Tprwo15aLG8HdbaDNi329tSOpoOWr2FVkUziYUA56gzoCm8jMzrqNnSRGrQzWC

VgUjthzk03R11jAh0HBu5DEodjv/TExV5WrqiQkJSs
li6mPz4AFAyKZiQut2b7YM6vrRFFnR0CZEXpzL53PSR5YkktcdFzPdK3gu1LzWjUokRSr5o6fk2a7krC

7w2BYvUFodHs9jHydxm7Wh3Kr2f/DNZfJrInOE6x/MqMtQBe6jrgKCbAWVgeRGjNC5WCVwNYQSmR4jDw

lE2mKGfuA8su91hfHn4GFTRCVkBU8ovvAK9xSJV8Nd
T39totgUq4BsQZOy1hauTk4shdZ4+kXxGL6tqG+sjvh0fmTNUOmokNzWPmr2wjsliUZcBGXsOxTchlyn

9hh3HnIOebEKEpdv1RNHrzVB39sb64ofdOqnKE9rnbwqzgDJPmurd07Xyzrgzhe0Go5hfi5kY4ezZtQo

db2d6JLWWDtaojTQcOFw6xfu7zsty4aqKI8BjXEvDg
eIwtsR4qsEKOY5NMjNbfsp9jKmXUpfjvf0nxmXd8OTJH5sZAIyTCWWcEOCG8ZRIa1QMlyQmCBQ0gR+Be

w5bI8G1xjQj6f/ImlSHzAdAi0hwO8XojxKVqqdyaT6bm6FtIpjnaO0w+Q0YkBSFoxj7pvOaW0OeoJlYd

wz5kNGZuf7yDoHFduWlg8Xgq7c/cpM+onZ1rZ+jLKt
jRrA1zPQ8kOJjdkxrHZPNBjdh0gzvzeFm3RBBTWDM/BY5V5cI3bhf2Yc6KhOFW4eojDJ64DrPI3QQWvG

aZs3M2rGAADuBy1f7qb77LfpOHHtF48r5s268bC+xjZPYKZm1fjrKuYl8yeFK8w+skfSgG9ARgyKb8sz

QJnka5eaOkWoM6pCCRniJXugHahXjOGrVS2BWrWUzV
6HunQmLEp75fcw+xPjHJIUQof3CB6ckrXRKKlqyp42Exaqf055Uqtpkl8u/BREGWcrPQBqQDTtZj3nny

uy4W056gw50RCNiLpcPMsQZYxU0A92FkebWGUxUEkLpQcHqzTmhwMO7uCci+BTCKzfLDgPgjOxVtFFDP

M6rWPN+Ez0Y54ZbnqF9a31oHwtp+/QnH02cW8a/B31
+h/rXU99WuE/IkJIutkd3GtOZ+Ww+tiGJotkg+oTHubEuvYQG1X/1JHAj6KNT7cpMc3nqGOkIF5yxHa+

NcHJc1DBTC+hGthKRe+up7BgwA3XUllyRls6B0ckx5dSyOlv4Imw/58u1R0b8z/+6/wUME43355KGqcI

9wu/r2yvEF264Uz9Z5bCqaaIZ0Z6Or0a5iK5aUnS15
meJl6jeaw2h4qh8oQxYBtYzlPfIXaoaUXidpMArgRIY9ognnG02nPUO5aopiR32tFZU5einhI67vM5B9

jikqj41jG1X6ndcpq62pX3Vujyk4fXnTL5UM7B//vIqhraaxkoGhYO3ep0F1cM4fm/K8ZUKhZlYDqCzj

aBZ2eS4WX/5SgjpqEavz5NuNO1Z/GB4j4KB4s4cRvg
9bLhsAANp5JWYW0oBzB4oMrM6rEJPm8ailVD6oTBXy7uwbEA5aOFZw0go3Hx9yVE1v6rs7Um2yKW5e4h

p9Nf9j8RoXmFqZNB4PartAig/7ohojQ1x+/YlTW60X/5Om6BIOtdq4VzZsUT4zGlvcr6ub2MmTV9O/OW

5d4LA8u5NS3t7zE3lQVUc3IJXV2dVuK4iCvO3rIlYP
vLokTa9droh4NkkdUq3zwef7OfnoKz5pyee3DalmVn6a5lh9PitbDa6y9yx5Ocumoyds3anZb5fMyJ//

2vkxcRnxXrN/KGvNNcTtOBO3TNohalJfo1H5ZGUn6hRyEYt6dw0PhNxpiez03wI9TybKQLJkixvYbycs

y5Z/12MiA+OP8+eY6EaUC/u4B34/cGEsXnyrM2eS3M
uWL9Gv4BYAByz1tLELCwg7wCRRM0V/HwgdW9M/kZQICKEGRupQMpOcyzImH89FbHupYoCbgfDWvHX5LD

ciFZQmY99DpZGDkE2zU9vXInNbdSr1MGwKWlifz1uuHL8++R3K37T0D31E6I4AL6F84+D7AA9/NV+GcK

/+weOobMxRK6HgwwLPrLVkQrhDTCSKAN9DMWxIZCgb
MsAu63AFhAHTdllk5OiFiXzk03Ah1a+we/CFHzpPymtQLi3DoyqI078dBNSlymR0rqckj96sYLvzJe0v

fififud/E9lKOHRTvIq5zQRH00zPYHwGc5HdZNY/TInoUC5KPJ9c759QNzs7TxH4xAMyepHXe/9upibd

7WvtzM3fi/7DqHEme0ZOLP/2G31DS46lixp3+/9srr
eRYqeqE13xAdy+56xx5qXuUJCybhDe65QYGIQ48Wd6JHEYk8h2ABJbEEmw8QcIk7hfMa5MVh+6TB7txp

0BiDbzd73gZJK6DsHA6/f+xVnf5/4UMKE9t1JbiwQdBQ0ES2oE8TxrcntvjlVditINgWdOI2UE9oP0xq

GG0GsSqEI6VagBcRXmhIHnSnUk5pIhqi5xTelWiEtT
stA6moEa9fmNYBsNP67GF3plZKTgJa7TAC5Xd9InDjHODkGywH8spK/r53Y/Y5RbPF18EqkikLnJXXqd

ZsuzGmATtwAC/c0Lp9lqNuTethMHw0ZL6Z1TSEmMsHhYp09r/vSP+xbXQs1PNWWKrTYl3cpkJ4sYM6YQ

moKoZ2IJmrUjP6OWocBpFC1xpkxQ3NpiV5Ur3N6OPb
8gkeuPcPrhXdqdqt4Y4/penATrMX081aW1KkhN2Vc0xxbL7kTZeiHRzRHyiSO1sQB97DkoVgTV5MceEx

NE6AqgHwFD3HxqZkJa7ZxjLlZt7HxtRhNg31Q+e5T21/Y6Z/VViAFXgA9/k0ryG0eXXf8AKgst5VXplQ

dHx8kyVEE1NM492gP46D/Rru13C/FqsGpJc5GbiKaY
y5QiiQyN221O+hF30LuiQeV85BnbRiW21dhXGsUAl7Vh9v9AVNaMjvGnL6ouOnilXvCCwLp8vWc/X+xl

q8yhBBCk9IM+7xz+kw9EHqvBl/N+2R6vqh9gkBNpkg/ut0zHY4++3U5cfxhNUDjV38y/p2hkQDqCB5VB

9/48n/BNDOFiYRrYknt5RZgOZ2/Y0jN/2kmp0krEc6
iY9cye/6rvOB7lLqh+mE2j/++S13O3bN2tLB9l+mOLrnP4r+SAaGa1XBmS93Grbi/a9cqb9/6va6BbfK

RtPmp1Ofy1145tZnnkqvx8qgCfisVXO4Gh3LmncMehr1F9khU51upl/uhNXv+BsyOq50XmkeR7y+4gls

sQ1yrFa4/ecTTH+jQDZyLj7HzdYvOb2PsvVbZq9Gsq
ItY21TjaSdK85EysMfL76UnjDiBB2gUjPpm42Jx9rVbDYDo2IxG5s+upte37JWGivTa+aDcnf7EN1R6y

/Jk4B8PtVqnl5aYnhY2P/7Bv/K4F8Z/UdZdKH3U7V9i54259p81lORaVD9ZFh/5fCv/IvaC07CdC/Xn7

0jfzsIiQQ3WS0oZ1qvJQRnlJiGCVRkaJrd++w6gQ3Y
aRQ7Sash4xTOXuFGay0Fx9M5Jr3/yuFfOfwrh3/l8K8c/pXDv/IJuVgPOtaDDv/K4V85/CuHf+XWv/tu

AqzAmFeGeWWYV+vLFnzA7176e0cWSSvqo7sOLVzbs5hSZAiA9vpp+Juliette/9uK1rbU99Y5A6L44XlHvpn2

Vw7/yhfkLshdkLsgF+iYW8wHC/V2NI7muVfN+bzb3/
KbrPb6iBvsUTfgYC/n0wNfcVGrhozH+4bTP76ssK8EaNV0UZKw/AqIchUww2PxUvFJTQphYfNTZOwhJk

KXNPiySdUVZZqjHiCAQMxhGgE51AuE2n1AeR8jRHHHTrqft2T+hmhDCkTAdYphuistlCbjUgqtZJ13Zi

fPjeE891G2+tntK1C9q4X+ACok4cAOIfYSNBIh/lXO
w/SvJP++gWghz8Rlot4t0FoxyMjSVA8F/E7uT/rB7V+F4/kee5W+xSz3vyt3onidP/46AOs23IIO98tn

nN+I47fP/I4Df+U+ysfKG66iFiAXMsb+38WiPae72RG0JjupOrmUq9xL7lWEjJ2jtR3E3FvEXOpSjw4j

5fmzY2Cb43y3K9BV6PK7CIzV1JW7P6bsL7qFPKcID1
NEvxBCIvGPQ0MxtQcEK0Ak52V37GWkMNIDmK/k0hmArLD3TlhX6CP8PWmQ0AE+V4l6jlAC80rG/2rBv1

dgAByw92H/agVchwZtbdhEf3ll+O+oX2GOI1lW6N4Dm61eB7mlWH9Uzy+ezAKcb3vbPPnYK+YJgJJQ3C

HJZQu4/asF/3lBz0anwra0iuY/asG/XuBmPvis1LsX
ydYlJ7EEosFkO9NMhxPaDN7sBcdPzkq/WgtyF+WujHL7UeujZ+Hvq17eh33bu71bxG3Fc/38yGdd12+t

hZe2Dq6945pviWF11h4jLzhqDLcNAxmRH6IGS7EGU6gmkrSrJSPYlCZWFPU05M3SKjJqQeAdWlLoRkLg

KfZoZnTmNrXQSjLhYbUgYyUyVhRySoDqNfGhQqNc8a
WgJohUnoq0SVp3hUpysXztYQe0c+gVAIESDAAlzES2uMBmNJr9yAJPnk1hyNuu8jaYPp3Xv144M1P7Mi

vn7Q+jTSVhV98EevAkJ14IndKg830R64o2d269x825z547k468m985j760g223kd8978T6psJ+pr2aiY

/ck7OxV+lS3SLCGskGT0r8ncCuNK6yk67btULyk++N
93djPiP/am/cL+KDG/UIbB1rCPCxNJAfMOF38c/WEjTYQjTBOKMrE3ae9PMTz02bbzE8H8rtYBNQ06F6

sECUBxFDeaRTo+QcOPhZyOUnuQl/jiqzUSg8EnnCZ/p1z4+LLU/uw5+1qYt0YQ1qZ4mpjpc27d5duQd6

4q0+WRSau/T3YW1We4w0G2XNBWQ0scL3RS9S9MsJCv
gGuSv/6NhVCPSRRiyCS6CAGCeETKBQQ3l/R048UYtZEIGIkAdVBBAuGnWCjZiwJYeXvpMoQG1gm7MHpK

h1HFEx6AJRvSOHZ4fWRm3yVbW3SLeQ2MCeUXFKbVAUCKEiCBiTehc94mXUBu9cPKsOKIg5yYfnz+JMRJ

jCIDHu3VIb6uJX1NsDaW0Sqg+wvLXtchtTlx8oNfIz
ItEEuQg5RgI8GnC2Uoa6EchRhhOQPEiEFLqyj4QJLcUWvjLgSJCOKdB/ZNT/pNT/5NSLkwQJNRBqoNRA

zHQKU3PNcOGSD1GxBrOizyRJ7YPhk6zTIFu2HLLxKTCHKBqTSTERGvbfooynZhYA3mOwfg7DqbHGxlRF

+5+c+5+k+5+s+2r5kmmbERLAMJSFyDGOvuPBvAZsHl
7M/6eGD98vWJyv2KrCjgVNkgpFKZmuE/QlMK5IET3GNqZ1MLxM3WXfPSSMqIfJQsjlsFmQAh6WBbFcIm

YrZ5chUENuJU4HVG20OgQATFyFxV1B8yGmPWQnXBDYPYjMbM8yRP8d/qpUm8uuI4HYGEdUTXovQVKBJ/

gPJzbIzO9p6b4dmQ+Lil9A5Yyv1vbV71G5+1CqiXR9
vlT6/yRNJSfFOmHfAllRuDjl9elA+S0U431NIbGd85dObxiB/vR7K5f/CxuBflRngkQ7mDssqI41YQpF

0LhtNClJa0FIo1bsRTszfBv1K77pX+LMWD8F26OpUnXnw1CuRehNebiUL0FIjHFPLd3PAFw1lvhBY4Y2

bdlm6JFfWqR7sHFLA2JWENheRIbZLYMYZcw+J851yF
ekaCmq2PuUdhxWY55iuHAENEDdrk4XTHw53/on0/2hSSpIxK7cSq8nKFEz68+OA4g9m135XeEtDEtMfL

9npZ+sVxDeLEcMEMHFCVEWWpHXPFQNXXDGEJQqCECTYEKJWz0XP+DtgxruI98C58YGbLJRlnuwST4NQW

yEQnVtUHl8CnTR+0FzbMlz0yvAzi9TKKdY0Oy8EH0B
UkLxRGDiLrGmhfSz9OwHVcXaTbbQhGrJXor4F0pBWdeO5pNPGFTAbUBD3TUwomBdl4tXqAVQr3lz7DLy

N6SCMzmOModA+mSQTBIjcZIgWSSwylU+dSmNOAGyQsCIxVaE9tMtIApv4DuPvypkSRHljMqRN2DOUEKN

Ur3ZXnG6CVdO4IYyGOKgFueh0QKdXJKqMM4vSmIzp4
TIHUKMNOaBFLAPXixvjwy9S+VCVn5BbVddEI68/PPbAqpgxGNfcRWWvdkqFsMwz5NNKIrQZU6zejH7rD

qTDSIPiZAoySCZJEbiJEFCDYQaKDVQaqDUQKmBUgOlBkoNlBooNVBqMKjBoAaDGqRNzN/eMgk8gRatQg

GtAgOerNMa0jpzMB3/WJkgtzShPsGvcxYnvMRInCRI
Vkl1flDZ9PYqVtRKbNELSITLjWRHixTHMA79vcOfLz8fRmo8xScj0JO+8bWfwKXZL4QTJY1MSrH2FXxT

2SCoQTBV9rrM9fiNfToYCxwwxVwW4dgJTcDlc2HaLeFR983tO3QQDCdlIesjubXTQJPF7XOl6P1i0UWO

eDxj6mumdLRxrbcknopDtMpAKrpcjtvUinQDB7GfI9
LJQwCetmOSp7ObNbI5u4E+TIriYAZGDUDTJOcbMvODhzdl5yHsREctmTwwHgiASYFvvVxDQKZ9d9F1Cy

o8q17WqkkQS+2aMiAXQ8zMmexVxUof1MC4BTS8hNtGb/nH6fpf9xhWAJSbHYFDCqBYM5ZbYimaTWxsDp

dOFscxgUxrsYBPAevo1CEWtKnjJubRl9IcG3yWVbax
Y8EvdvtJ6NFtaxgrKO+oMohL+HNRLZzAHIWQR1HShJDNHviEGOafniKNkCecGRYFuuNZPkHLSOrKl+t8

U2frXuXaNQ+K4JkCuOewK9914TcNaLtMdYTZHLBlncwRXQPtmMBUFvEpFLXXoPTLUvG5W1EeR0JbJChJ

x38W6eST+4nG/GQtSLYahCizoHxjoUZUis8nzTHVM3
jRyYPKkwtsMF9lZSvgQuPLq3Ykq3xsKhPJUMawt3CYhLTvYDMV9ItAOcNvGgPdu6WENTkWQJglgPgLWp

nwkUQQdvNbUkcGxTKQP6mAQMaykgR7Y+zASy4HfQdVDqkNMOIstcD41rrYQ4f9lhh5dqejXE+tIs9yci

3cHJVZrhbbcYn3ed4pKMHX6sDvn+C3BshyVykg8rx3
rt1xeEgp04oYbPC0tc5st4gd8jaDXDnK0UzRLOQqHSKETHtYmIYDsoBaUCaSyhS1BEBqVWIFXJE3HXJa

dRWJMGiRLYrueW88O+YdFIDCpYlceeIRxYSi8ElNeUvpuxEVIhsyu7ClYJg3UDuDaw5XWwQD7LjbQIdB

TKCxjk5fAiIUXc2WLhk6QEor3LCguQRtAWZxSzI+JE
VwGUjSCVfUnCrFveg8lOFcVEOPSBdRiUgWxak1bBIkOAJNUPiJuFaQhuK5o/3H3Z6cBQJolAt34fQFwO

jaoaSZ1yBNqal31VJXIXzRk9Nc9GqQMR+dUnuBjpseBfMaVNFuZR1V80ynbX/Bg9+RP45Fj86T1V2MYJ

yGLdrVRz1khaRzMmMJ3xXXoDGXkdOTghicNIyOZKs3
WQFTx4YqAKKdjitV+3YBywE3YamQCDtu6gwwKnbiEqPTsr6x0RLyWY3u2ts8zXhhV/QtANS0eS5ZdPz5

mcw94Tnxs6nIoCjs5fYwe7loFWDm7fpsdoHmUMuRPKLbzeyXkhgbX2xLARyQMPHj9LfYMFGTOlPXNeTR

IFrWYaA8tPwFMFMrAAPQqXMNO0GYESdvmoLgKo4IJj
h6VTxx9FPgrMRPkIUQVJUdNImtnETzSuHzRZoqdQGZlmjaL2QPkCkL027TCUXFkXZPXvJODphMvZDnRc

kxABSb0BN7mrviZPqTUwwctMDeuFhQNp1Ma8qTXXNDkbJd4Nfupw2XNwBtWMuiWkWfAYh7nDmXm6meY5

iMhL9FTvV1AWGhRgUTuaaxjtc40HzFYu6A6UNVAhTo
J6WackiW60lPio1u7IgSAgYkiGEOPkGl6dgjY3QftsKlxrLKdVudSU09UcbLSTMAQT66YwBUuEOLW2bA

895ShloHcQ8E/oDPX79n30sQ3Nr1KLyEUJ24aDtrSdBUFDIqDk9hDa7lCi0bDm4rAk8aZb9qZy4tXn3t

Df1nJo8jAs0nCu8eEn8uQoMAnaQECVfLMmQeHwg6Iz
cQUsBbKRYRMRM7AVEX+Ymb+4rpO2FBuwq6brJPdcDpSQxSVosQXTvoTRVf5Lx1ALEgVfkfgHCJGWMKyO

wmAWQURAJHJUaf4EH9W6dBk+eMuwjFncMgloXaCprZvJQvryMRYX0BpQKFyxIERp5WfRqVvaJgOJZExr

DTZUxLAjahzIncihr5p5rUYH0AopEl1LldrrKrdKgc
4vXAhtFZKVuv6Ac3FiomE+XGFcGgKITlTkRBr5hSEZmtzXIfvwR/p3PWNNvw4IOeKUCnXM9TUM7A8uLs

d/dniYk2qEJhPotmSo1svkpTrxwTT7bc0NXrXO2evUY0sD6JJzfMDO+qxPXYw2D/y0xSmwRsrgLbPymf

girxId1gmY5ODYdb93g4YSuPrriAgHxGrKxYlaRU1Y
TnqtYAnbbjZXguhhRTu4PQA174znbA57jpSCyDwKfZ4dr1hjTJGFkKXnuZ3Cohw3QbGH7oF+nKY3Yglw

RD7u12hnzZuJBiQklLoiLcDBR76dicwQLqHQAsncXSvDgRTZByFNt8BBDlDii7hTtu9TKmvKVKjhHZQM

waODUIahDUIKhBUIOgBkENghoENQjOA55L/RCX3Imn
uPi8cJNZ8R+ZFhpppqNls2WIUoeO2K251vnA+mA/UR+ubawZCfWSsiNui9YPScVpzePRIVXXxjvJScJp

GpVueZBxVcyBGrzwtGuTUWodFr8J4EVRUk4vubF/LVD9bXbRI9DEyjFL1oNYnF+ttq7VVRlUIOgQEN7Z

UUUNAUaLBOoDVC9WDLXEUeMiqeVbWcT83fpQ4AG1tx
J7dyW3ekZX5SNXf4IWGjhG+5x5F7gZI0gPV/G5lRwkgMNjLTd+AMogV5D/pX7HcnW5Y0lsfBGyIZUb7y

pjqZloHV/BsvXJ41EgjuLZP8FWYKp33qdhp4QDqZNtvjv2fuoGhBuBAOAyyPp+jcExCCWhBkENghoENQ

auTKWadQYiXnxcADjjiZEzXqu8RY14smk6MkqdKtfL
uMbOTwM2dGbyvP2Zd+/B7rrtkC611tLyWNCbh6DEUEEpTdADFQCz04AM00frmsN/bSd++n11YD3P4wGi

LKKbHMLBHFkFsL3Q8fAyIBPrKZFF/E3OYSquaCCw6wUVHtmaSDUrMwzsAcu2zfMgylXOeya1U4BhW8pu

HV/Zm5GPDgfgCAuwf+GOFA38popNTlqziRwz1mQWnd
BgzF1oTnBdq2PlqtEUhGulgmoGECpKLlZeVg6vbiXmEnsPrux9h8LykWqecQJjC7XAqetClm7ptFa4/g

dpskrrAA5965zjEOxTExwQqe1To9WN4GdsNP6o0LFjJURXSq9Dl4gWqMRPmqEt88TZgvRkEH5niZ6zqn

0e2BDe61HCteNz2oohWaCnL6ExN/lRnqXgJ3R20es8
QdXSBzh90ZemCc9VPEzSvaKmaZzXHxyOSQMurOKxhflVKJ5Zhu7ZNvskrMTW0uJiO7SZRJogwAuYvXND

CdIGqKQMdeIjDM59FFVUNwaLxhXXuoEbHA6JO9ZB/WSz0kMfMiTYPBho9HNGMTgaWXlQV3WNUKkAwFi5

1ebnQNzIMYJnAfih0jVQu2rVRu7yxoM4Bv8lx4lF/U
QdONBGB/4KRB3j7L6y2NRPs7IqU3HAO8IpI3KzwkLFnpSnNSmLi03lXwmahnKwEaYmXGni5ZtLBjgo+G

KfeP1Lyin+Hwzj48PjEu32iegyoZZM7mkWa9G4Lg7zzNqz7XFepOJOyT5OjoUcHzmp6JqZ7vwUtaLhHS

OVxIHZSMImMRYCmsCKMYzQysbHYwbbkWeW6aaCjBxN
UrT6pdnC94lCiwUspyuJc6AMp9GORHuiKdIwYnMMrXvsUaMPcVZIEeywFELqOXQXnmWcKNwpPmvFi27g

rM044MdG+yjRviZavhtwK60uwBn9dFy50+ZorASmUVg3ejm7G28pB24zXCtKA8TrMdSj0BaUz71RIVGu

jcIIVGzqeVJh9dYmGb9HBLxXSJTVLR6+WpghxqLzp4
nHIplyboLuW38E2NlOmV70Yg4lW7e/zWAjqHVkZHIvisRTlVyNDIR8uyH0Rm5jpY5KAuPjBs1CcM/BOR

Mdb+Mi0oouN4Mt/6YiHEdHdpKPCZWCU0D3NqPuMWZYbdQtqXKp6jq4BmtEpRKKK/7CQX6i9z+cqzMgdK

6BHv1a9uaCXLHbgSfAQ9PBuyRmCo0GlXRzL1mCf4HH
ZZp49IAf00x97gmh0ZI2RZd/+PjbOUpSAyDoD7aoX8frgUKWgpxAo4jn6HSCzFWBYW9tr59Rh4W449fV

75GtBzjNShyRla3GdCqmjasEdxaiQ8SUr0VOjaYVLWl7I2GBM0VJJB3BH2Me+Zp6E7nRF8WqYDzVqhOr

TF7Dzcdw+KIDp5CBLBtzTu+9HTyKLBJqMKnBpAaTGk
kdPMvFzGnHCrjzFPqXdWLKYKavDMDxmpERcLmXZJOkNzZWdGj4YIPh7KOCk9THxh7HKNYaWeajH6yruH

PCmqgSiRgYOq1mtRVnG4ZLsSm3xf7MtOz8+quSC2FgLw8xzqVYs7i8liyPS95+AnMzNDNi1PUvHmUCSF

k3W5kRo3OUajWnxYuteiZyyljECrPlav2/lN0/1HB8
rheYV6LVcI01JkemFetX9EHJ7TXG7KXEmVLyB6pQIZe1uh9mrmq//+t//OdP0i5+fjn/87///tv/kv/+

95//8/c//5N9uT+/JAj64P13d+zL/aVJoKZZTO2vT6i/NZQfnH/fX+c30rcEPBIWvxkNVh6g50gYufpj

iCO4jKqnzoYLYHUO5nsZFcVZFDCX6FOIcIRyvlJQax
JcUfAIffOR5LNKhPH4L7G0HjXn6saZ6PV54t/O0WkWz5iAhQWhgg4LIdJzyMOcEhiKR67ZtkStW29Ytx

TcTEg+lah3KUUK0CH4FYgT2NR5U10KXNlWspMQkFOE2aJkPziEnpna/3214m00W4O76C6B3VrhxON9nU

gkUXZWkZ8hWe4vO2oh5dM8uX6JkOS1Qr/U0dBZvkNf
9WSARqvEqhtyd1/Bd15L9v6t2Rdk+wTWpDVW6GkmwK2tkyBYMi6FN+7OzY2n/172qjYN+ugu4kEbsik5

NWrH5slup3xxPhKtE74g6zpdfV90F7C187UFbv2xjLobm36dMQjA4KP9U6b0SucQCyp5ze7Yi9ZCdi34

2DA8v0CZCpd/E5jw261lf3O61cJeN8KgLkguaa1wn0
8e+dp9fpwhB3Mc1hdgC5vdy9vKhQw+AK/vd/To+kMYH5al/P1rrz6+57UYqdzl63yss+5rGRI9O/edD4

A0QWz34PUpjjyv0nw6rL/Tbv5Dd6OIuxYOp2Ur879LYkDKcscneLy1I2j3ZtuQvGgbuhmhkjskvXhrk9

FpicdadYESgY41tB7U/Rc0j9JjpGmoiUSr6rpVKa3z
97RXhaW/k/aq8MB3+vdoSD/uOA6fgW37OZSe0Xz4cr1fKn4xhM+LlURn2okhh043RS1N8BNCMhcbNbLR

Xp3vf+8Z093Fg6/36Mm/D+AJbOddy+/7+14l4YAJK/Bu/LVXL5a+Lunt5YbyQzxI6jcc11vxY/CvBvyr

Pfv4Ozf7D3p9Aqexf6+CYmUSmcZS7AFLbEIquL6/94
cLcPtXA/1SoF401H3J+FcD/jVRwiKnO724mBNDngUmS2FQ6nl136C5Dr033MBL6BLnRqkAa5EdZfs2tq

1IDRK0nI9jJUcqdkgl4xcw/+4X0fvImhIa12RIoOl6RrqPY4kN6bzEzTbAOsdfbmf/9+trvI0GlHm+z2

aEx77jdq98lw5mvTDUY7TftTcBNkL5dMk5/iYPv7mM
TLHjHg1hjQ40vjrlVE/gfo/leZUWkCegSFC8Olvc651FnGCQ2oMpLowQtPhuVAJcxR4PMgJ67ey35COZ

IAas1CPWnPrtAaT0xoIrEhzfsqFjoKksPg91Po6t7PBGcEryQiC4bhkfEtB6koyvmmauto3E949SeK/R

6/0Z/s1HtQLERlU19e/rARZgvL+i8jDDx5Rf54olzh
61sUUJ8fJv0Pj32A7c8E3mZgv5vj/JpcNi0bIO9j0/S4w3Ex9MDXg5uGI/nbx9oobHoIECWFF/OnAAL+

K040ktLxJTwKHj5o/c9P/PXv+Hx02m6d8H7hp/Imgnpu7SEtokq5JHqju7/BZr/lVfblsj/qov9/v3r5

/rlVL38TZBBl+sdT9i7NS+uh7fFtxBmvg/P2Bmwryw
o/5DM93IsvBxkMjYkLE2Faw8TvIy71lqaVWQz4OWbHR4ZV9bB3T5u30hbCAh7oHYIPza7uLGKKfhZjE0

6VzhKTDNah0iRLAvwk8mJPC87iriKgwJuB5p056ak/6VhQDj+QaeL/otc49m2b+DFr2/YWmvCmNerbYb

BntlZa/O/LexMzmr6fwlLp//dIIV40094AcsR4s9H0
2B3zPanM6qDynuF59GIztN1Yno6wmi/WJ/cv4qcq18/r3tsz+9n+Pwrxz+bom5DsUhV0+gAjz7078cYo

KU7e54j86+jv0rx/2BhkMOdJj7Ta8/F9dWqlIdiVS5/StP/6qwA+N+FferuN+B+g6121ZMIhvr260j4S

18G615O+gb8s80g80+wALc+2Y++/sMY38xJ3oS8BDP
aDV7xj9b3lJrq8Yfc291/ElBkalqSCbqAS9woBqUVYn9UszWrIVrpx3396mig/16+oeosO3yiZZ8txXl

wHiPYgIbsAPj+dEH7nLAc0207rK+RZmMA4RPU2iOgTC+L7xHC+/Rbn/StwC3H+u74wuO/Xbfhu/g/d14

f/nWbt86mvs2/ek2VQorOdNDoM04vTzu9WS/PbDfHr
GCJRXyqAi6EmWEYv5SGAs/Nid6Oiuamu7D2m5W8vMO1mrBix0Hsg4oXqPWxM/xwS2HUFXH9KK048pfhA

8VtX+NZKDrkV600u/5AAuwAndcMmb/HsXs+ZtUI8Lhhn5e+3UB/eybN6T9Tl+YAg/qYViVWkgZG3i4Uf

mNLOJivr0l357wB/wa8VT77kyinc/ABFcld8F0+cD+
1Xv04jQq+Wqc08Wkv9Ao/bAg0sMFvB5dV+uS61rjfZvz/au+3Z7Z2Vi/9049dNN6O/f7v/H34Wmjrq4a

IGUduXG+D3sV2G+X5ryPsJjsLLLaMHB1v5XB93EaSSc+Q383C0CQI+NKnVIZUBQ8nvpoLUmtvuI0532X

y13Yb1/KKHRhGc2mim/1tP+85AHu/faV9iq/L5ArkC
zSV79iE02n4rcsHOL1/zchPyjSP8kJ5+/C0n5/F/cpKsbtMg2ecwOr6oyp75U0gnTtl0afv/eZRHQ1pe

a8ht15I5rhscnT/cyMMdsb6MejRs63ItqvGtwc25XltpoZu9lqIJIrOMsW9+/Vfy7mM7B8mtcPA0yre6

Wq5541LXKoUW4UH1a0W1c0sHX5lj5F0huP9snR8bsK
ZU5SSqKGYhU5uW7NAd1rB8xyLxU/sI5KYqPWXRVzdwzLO7j0HqYe4svs1S+tjee7+7kviFZhR7oovwm0

dvrifeB2KQpbmXK+417VteIRZhgJEoyLQ2XQY8KKN1FQGcfXlIQHZzcClADgK1mnyr4n50uQRLikujr7

tp+pDEW4hqN7MT6pF+407s9CiYN1/d2jn+9G/tVGPs
XkA4kKT6MkK/fE3XJd28DV5dXdOF/1OXKUXNNO6YTYqLY0qC8D0fPErYW1QtIPKb8aDBrTUOy38H/jfl

2Acb/e6/5pL0nDQXkIU2GfjV3Kw/Qpld893lW5Kj5/v1HP7kfXlLfNnN4J70G5Gn+cCVrpz7wM3Ov0TR

a4p1WYZhsixm4G7v55wL92wPIu7LaiznzuaygnBtxR
G/nYu2rp706VN8dl4wkMgvfK3oNIbmbqm1Lq9wBF0bUVnsx6nOgkYiFn4I8wM+0bOkCWHmesbeq6CKG7

8ZZA5PfrJTT0q+pAdZ9HSD2VSqlm95egKzYj4e41soxEbZDTzA+7Kah0p06ckgmedpeEL7lNkutjxvD/

ZyD/2sMOLiaeJ2r0AjGqDLkulrhy5ljHks1GIH1kt9
1Q4zMp7dCNtHjjAWsjmqGTMi1EWey0MAbd1JQqtQAPxcy2PNC4BWazodAwGFE2vYRxZ7SnYp8zCADPqQ

BLRsxfl90PSDBmvUUV9fi6BtjY+inkNoZm7kwDAnO4ErArki77EnWrj96hDswdhl5PduqvVSAus20bxX

THDGrO+GTV3fdllfvqHZvtBj7cfej0F/MvGSSTxEic
GxhffozJIVat61vYgabz7Bo36gZ66bQfK+6y09mX+94uECgO5CVK8l148V8BhzMRJ40uqH1LKXQQAVBm

RJBCIYIcCqmk+ntpaiCLcjYIE+nzr09r9HTXbAMzD7z1hfIEYsoBH+JC2UCTnd47trRAEFwyp4O0KF6D

3UtFmrblqgxhhu9iGZB+WRaZnAcIAIggAiCCEIBUzv
2u/7cNZjXj5ntkfxlKvnrWIVBqIvQgg2FpCJHteoGJiLlEKiWzBy44Ob+DbMHwtDGZi6Qz7FFkDEPTKS

+Vj1/rezutSeBh5beLlNsH48AOQQXXZsXYiALDuNUMsLj7+KRvndtL29Awo4X2Vj5aQQQSFyjkjvRHzo

psarB/41oS8VwgAE7np8IAZCgLALhs9ZGbcPRCkYFN
DYQaCDVAIEEUkQRRhBJEEUuQSt6/XRPRYY2G/vb93q8aS9LPGzOuNSQAFKCECFTo9ZfdoBNUbWVlIttS

l/u+VIqq71jx2erSt6j/rX6SMM9JHwVqEJHHKMWG+q4WQVgTylccioM+WQSeqtJPVPqJilCDKGINogg2

aZOkZBrnV6L0HMPVH9QJXVScb8ALfpQQqZJClhskcA
lXw8pVGctQdchEqj15qYflA/c7xU6y/6uRGdF6zng6I0lWLkBGF0bU0S/Vl/uuWDQjEDUPMgTxpJyzRr

L2nmrBOFN+rcIThn774lrXy+B0DXbMqiMaoYKQj8tDSdak/+ttIWEOCCBHMwlDpWaGovEaNU2DdbAsRf

jFvagwCsyizNJRKZGcRrVLYawtfZE4JEWsKyrRbwJh
8viu4GrCRWcsGIu7qIi/bvyLYW5hYO0O1ftIFSEFEx2mcVifYarosBkjGtNGBAp+wYplsBBz/FRi/Yanira

/qJxoq9ibt7h3/YOhC+s7xVdWfLAqrCPzyO5RMN3W5lBdzDHCQHK8mFhR1LawHaIO4LgYCmNZxkF+olF

rMsWsi/7nhTYXgKkJCL2mUQgPtJBT3BRcpTr6SCXBn
UEl/YntAS2CG0lr5vkMeONc7L4GPnoAwpfBKPucGElMwZrEFkSXGY8AyRbmrXfAAuuCZp1LXvdn3A6Vj

xqsPguLGqACgNhiYGwxkCqyOASeKpVZnCJkGDdOLCTKLfUIB/JYb0iUxZnvb4M1SsMmjNtfPKp0xAt25

4Qs6Nhr606fp2b3sLio4KXhUOUldLINhQeYpUVQrn9
DMuSWyXl7Uy5o7q5c5QG7O3GvAmI5YquMgQXtLCqViKtX3DUBDJ9OlKVJuglkLoJE9hCky6o8jRisUJP

DAelD9bMZ9YGmwKKwYHCtZT9wEiB8A2pRZMWvrYuQuPZIvxRD5F8VRKCRUqH1DAJIZGUpCX7gNLg0FwS

xEicJEgWCTVwauBCoiTUwKmBUwPaxIkYikznGDjWTL
bkfSiCQPbQPDveSFVs7ttlU0EnB8P1UvICH8QsWgLksJXXYvhQYkRUnlaNcaPIe/v+RO6tDqyj4rkK7H

Qa801C+VN75T3Vj8J7xNiz4g3q8XHipLSLT6bni64bQyF+VQVc5fm+otFPNPqJRj/R6Cca/IDap6y9J8

1+zdjjxGDFJXBlMoQbv29+5F5YyJjt6MfURja8Wtic
TRcgA4liG5ESpslGsvMkrW+k6kHzHrbv5Du5s9/dev0wM5uOTjzqQ49L3kb9xBZfpL54U583uZP9qGWA

mkqKKkAwNFNvTX4F0re08DNs2CwVkLfjmmfUxiTXWDdCaEgQWAGaFiYYoTaDLORmBYWmmqNKrqz9CoYG

VMqzqxjYMHCJVeLXPWmlorwAhNUGKHZXfpLXSkC5zZ
LdIHCJ1SiJ5RNPRR/xEFKeHJmHXrVcoOPMS8jNOOo2kn/3NXaGSLNUqUQuBAyxZrnFEkWwXqhyiVxWVL

9EoHoQRyp3YOKtJEFc02SfcvIcCrEsz+YqyRPQynhHOlBE0yntSj0GpLsgWN3MAS2kMTt3AvhP+46bPx

yZ4GdMESI6OuNCUUCKjSRMyyC/jVVLkQ+0yirnMaX0
oIAf8kmwtMbrWUAxwRdtxfLYjj2LCpY5GVEngOXvZg3NmpsZLUBp6WR+IssrpOorinA/0RljYYmFOHKW

oVP3C8AqzZohDgNilcnDT89To0yyQ1BsWR7tdOGOuCOEqISRsbOFrVVwf4aCYVcoxUXTobyEq7Xki+C5

a1vTLduG/5F5P94BBLZnivqXpEKHGfKLjBgOVgRxbm
X/oc708178+yoL4xGzCuufGJwzgrPSfspyTzhINgP9hCSkEwMPvcKvTvq9++k1lJmpuA7Hx+/J7ncBS8

ZWMu6WYqSDTZBDv8tWyYYhPo+tOVJq3T8LmlnDvUKmEhMeGXBPLsmUAUy7hQVPbIgstIMHmUx3bGCiRD

YJWUzCDo9VTcn1BFoG/vTWKBsK7z5mv84JX9TmOD2z
MM4KXIoQ+wcxHxIhwdsYuZ+F85ojdyX+lsFRd8XZ0PDbZ+5r+ssdR2k1kCYAoQq9zHxjqEJQzNUw6Hmk

2isleK5S0rSXYUOUxql4jhRDpS7GYRuhxITX8gVVtvjtArptQbCAYzKfQE6aKbWJGG7Pxfcq+wnOxMxE

jGrF/N1W510mt1ukJAo8YTowt8ABjOLb2Xx352ScrR
1cHK+rHT/x+yj+gl2gf7JRlz62J7Zy/ZCVPzbGEU9WY9Xd5hP7pLkT3bgK+zNOpqGcLZ4ZtPu+qNHZiw

NgcOnXAqXXXFkbeRybBbFVyNinXTZRSSUgDyJBaFOWHRCpJNNpbIUbOF0onuOTXLJPs+eqCslBrLKQS/

AUWBgitzKkiJPgbaziEKtPsI+0UH4rC/A2bgIYNneC
cMXFwcqQvWbQNDDLhNSxUviSxvzySvE9OH/cz8gQvavZOvG9qpZJl+DiXsNkVs2iIjmoh+AjLRMSJRkk

a5ITrXpWypQVTeJG0gVuoyK9CJ0OZeyFJIxQNAcFgODqyb5eRjjsBCUY5P6y2R4edE9uKUib7uqg6ulm

xlgWYyxVTXK/cectroPXI0diKYXzkmDXohm1iXbwPD
Q3qXxAr3TZw0NXt6AL4ubmbzAqcqtX/iIpDdW3OYScgO5IDblM/WAe7Ae/pcx7lveewf2Kxz/DynU/8A

21509bbZWROekhDGAnMMWRahDCM9+ElSdyS0+KUAOFPTjVJ/fNOuUn/OcLFKvUGNiyNGmSbMOafhtU1H

HaaUsCZjzbyT1BQGBaLR4p6mBTr5unAh4nQq2vOv1T
LoAIGwCFWQXGvezkkZNDH4xGtRYsnfMUBircMl2KuDpJLucsM0llMXKPqJKboakcdGfoEh0lhl5hqCeN

anmjGNM2JfKh4XPNhaWaJ75abkjfFKmkD+53WgbfhXB+uY5uRanoGJTmTgtndfnexJ6vNzr8MT4AAdgR

31UqUXuvy7Fx/yzKwS/B9ljW9Hk2e15kk6Or6cotYr
DaeTM/cf+cIryRu7/hJ+mGX1Eh6IHts7pyRL4Ypm7XZywusOHJ7iYz68uNkMq064yOZ+sfHU66kvmZMc

0RjozZgpyHv2GtSJvjueVKgatxsrGOI267nLbiIIrSI+4MDBO7jedQmL8s/VQybI14yv0cE2w2ja5yhQ

fEZQJxewgnlm12o4Li+Tc96NuyuSAtvIZZPA1awopu
N1CTaRnKliRrYx7MsA+lz+RTwAlT+uXHZI0dynS+HDWXX4msnY+MoeYQ28cfr7Ax1CnMgMtTXYQwZjHL

jBrw/OGHBxA/Bm8WE2C0T7EOkctpRdfxBBxD7MlgPBKXcMkYdqAfXJA5xJ44z7lnWUYpoKNTd3qWK5z6

8vfCUKvPB0LpkY4rj8wffXRQfo2IyVOs5mUN4Lz2hG
JadlaC9Xw37BNLIwiYq0l4cww6f7XdYR6F9hArDjJTLijGNMagpzGTz14i7Yu7vNow1phtlTzQ7Yjb6x

LjVLNst9jcOnJoWDldCL2MJe52Jn4HiJFBZHVgrIL+VaHbY1jx406hbVcXbpP4HUdDxhZPtBKXugyDs4

0dVpjqM4X4BNTzASSESwzkrOqgJZXWoIv2IDWWInvN
BgGus2RycPD/UW/fiPpa/kzi0IKjrQkJH6XwCZPJ3Gca3DhfYCzpkFS8YUCzk6ZTKFcZozilWcfmIMpC

YYBcdq24zvGCWx2xgkDVTXUk0O2ToRIlEcmFIKJcf246gyP4P8HIqbEeANKiXY5fXU+bpODNp84CMOtl

GihBxJyDwS5vzuJFq1Y2opYL9HFmAID4OGjRS8cSeD
zXg7A3Mo8+pMsPFNtlnQ5fknU73ZikfOGAV0cR2PQyT7ItL1yGTQwxPGZQ3DAFtJvBSdFtT6I0lkoOlV

v4gjbQgwkrhiKNq3UaRjLTgxw1BNbWUg+dlBKldU2312karEkAIj2IpT84+xO8jT/aHG6vKTsTtpkMN8

ftmHxEtD2Lotyf/lAgiH7WExBg3cfllVbFl9XSRvuL
htNNpKdtDZaRlTkUnJaYjKatDQbIbsMDoFkXLp2DvTjVofMtUXDyjgmQcUWT7jMLd7oSnPlJuMG0H+7K

lcq+7VYudW05D+uF+c18XgU3XBwcUwmjNaf/v0XG8HSG4IqZe2d+1CRpPp0DJESQn71go12Vz6ZlyWIO

Lon7vBK1OgRlIHQUe20nq8CUe4SXeuxZNAJlcjNzS9
vbyKK+Bo8qeATkifITjiPfvspgTdQTu0nsfE9DIFVkr9ZQGq9FINo4e80vCqQPJTiUaHHUEacxIrbxYZ

tYdCDGomxvobe/JYPTs6ROXxEbOV6C61XhWzvJA9hkQM5Yq1mGQ6xAZtbSkswtRO0xqG8T9dRaeisZdq

3dDQxsU4q0F/ngZ4G35tsfQZzWB9R62zXYZfEZLoez
iTZRmWDi5pELbiSZT6HCBEib3DDjF2yjBTB539z2NPb4W3/J3KDhe6DKuKWUMNRmmEX2GoscWnc6VYbB

qvwqW4RlTBRJSxy4nEhenGnSHYJvUEmA4WoEg6ClD3srFUwIuODAGx43Ajw3zDT923N0g7DHzkGzPg4w

fNtOaAQXnhT87un56vulEIVdvgxZsZik1fpj8FeIgu
lp0LqjUVgxHaCHt3xQnzYdIbD70S6jBTBhkfcB82SPoHmdHEQpzGQxrooOtxvSTTCyuTCn6VrbcvNFG7

GaaeQDrjqWSzAPbj+KvXMPWZho2tFFMRrhSeJWgX715glKAT8HMJJILCjICNjWIG4CoQb7a5dEfh6Cwx

+Vxp8YK6730ckrt5N7HBv9SQBgIjBGNLX0oQ5eyFdO
IrRMAb8kBSlxGGveQAmlXCliQCsv4Nc/yiOJPI1EabS1CecPytU6SxjiNRq+ec2IguGTnwiT+DgZF2O4

ZmeG2Ot05nkHkiGDEYtBpW01aI5QjYcBw16+OpMivA3a8ZqAUkwxTGt0029dfDOR3aWoCdq6b4VyS8qp

xya+NStYCPYLFsmwolUukA6DMOyJ4qUD8iCN6BNOt2
UuXOqngx9UgTAihR/qU1rbCuC8tjE5Ezm5gio0M/ivfzez6CyrSmpUqr3WO0gfD/tCOio1Eafr60D3RZ

4Ky9q+Dv7Gx3VEoHUdXyBTh/tusrqAywfbJRMVB9DTzNgbFYlGMEur79skPPBR0NJFv5QNYlXI3WJVV0

5cHcejlIVZ3rAr51aAgZkKKrZuWYzrepWZf5SpN5ZZ
iTzaXRS4XHd7GQUwdPKJOmynb4966fJmRUVTbfMSq9xYdWdmcRnjHd3osohrSzJMIjBq6BethfnuITdm

+4Aar7uZ+CLEuNG9NSemhiSia50y1sjICjXC2Gg7VLOqLeYSma5e/eh43kVLoiEXM1PdPI2awIpfEUD3

XQr8FpZFU015rqrVbX8bq54sRvaHpbKr6DCQMykJMB
IgsKldKZsYsxV1gkXJX40ExMNPFUfxUVKBkgAtngID1pw6wT/W6cv9X//629a8igsrL+6D/8m+3C+eB5

+g/NdWft+hg/5wnt2ffMLzKe+cdXRfO/mdvV/7UEKkCyYCYkLS8ILmALUIWNEHwQ6VRwG9PK4S7QN688

uKAO7jyD19+nWnsfeTSYRK6juZxGawsq3PDPU68AA+
lovg76yxTrdZTMKCeSKCPLixjmSMLWBfrivT0+bS9+ja9+3ba1IrfUGi+Pp9APFNs1d5o4D1qalQk0Cq

zvy7Ag/zYRsS9gD7zoj/IIRyzF8ADif4iYX8HrKSrN57Qex4lZcn/QALsAIPYLy/32Rzuqq5b6Y/u+/3

hEm+XwsPE67IstdMB+L0kT2G0lZ5LGuW5QH7I3eCKV
TLQRvTaAW4Ewzpx+VYxX94qM1uEQf1U0eOqBKXgHPzvNBI1QIpyRcfUehsgNefz/S80rRX+AmTEosx9h

A7uN+0N1o8X93qLgsd37h08GxdEAd27CuQ10H7nDdIuCMiUjVJsEd5e37kDT2Om5tmkl8T1N/6mn/fRc

Wuru4bd2a9IesTwi/o2Qz3wt98gT0H+4v5p+7HsT32
aucc+AkoQHD78Vtcyu203ep4YI92e2+uY6zYa0+/z8PzOx+5Z6frn+aOHI1N37wa2bTNa9lsGuq/5Uou

DY+9+sycg7/3O/J+s39kyX44jksx/XsumY63fKkm7T75WrF+wt32CeO/si/3/z0fKxJypV35fdQDaxjq

tHVRL1Cvn8654EvhqIALeOm37ma9Xa96pnA+9mpE4i
H7P0QIOp7GKGrgD2+tEc1Dybb78vI5vdokbxPTUgcSpCG6XE10OT58tsHg3MA1s+av+nK/kF61osrLtb

0JXWc4ke1nE6le9tdaiWyFN6b/uY03f4y44lo0nxVy4mmu4U4+72YST1AiXlJVjzfHgMHd/1rD0Vs1Ys

HuSrbabUV4qcK/ohRXt/F1yVv6Yd4hSiOQgvWfHiHe
sP+kguaelKYr2vO+xn3+fy96yJmtBjLHMg9EL+5vdPev+nJ/A9x/7Kl14rtAfhkcEMhQA+9wUgSi8qP+

5oP8VV/ub3LJX/DqqfJcA2M/Xeea+X1mey922zxcP868k+DFL33y4sv4BqcaYNR+UaK/6st9x+HYqxqf

Y6/pLMmdh8O+rC7WYRqjTY47f52cnynu07Mhs4qKnr
Rbmwd17xEO/yp1S/+cqG9jpPFBmBH2/ZwB/3iPc6YbH51vBBOafLZe299xp13ud2S2sTVltd4k/cmxFH

qOK5NDehGZImV5kfkTkHH5I+5H1I801A+khkhnVK5m1ua9+35n+leFBViBB/TLQeCVLcRJ9LaZWJ+wAC

uqUPPv1jWPLJaP6JC37xdl4322y7Pra3VRsej1xfo8
v1goX41pZ9qcy+eZ/fKe07be5I/Ou3DiB/W+dDZJszfj00sES4pxVczrJj1Yi15yb2K/vcoxuXB4R2i3

ntOAHTjw/QW8+zuG+990XMZMdnliyHyyiy04r2slo/7h9hwwZSJSXqJGqPC1nNQBliq2vFBPXhdNyV8T

KOrbtUhRpacyIKAFN0u/54rrUH2NuGawQ+27bk83Jq
B3V1cFAJ0D2MBv/vMJArxYgBU4/ZwjK/6r7pV70K6l1Jn+1fNXfbnL/8odUyFdO7d6nwlM3ZS3yZXJ/6

tkESTrVs7oUKe05z+Edc6P709AfEU8N452qM6/aub/9u/g2en/LVYUh987Q+Wv+nJ/fq6/Y5Xb/I9l3+

veu8hkQ/d3U+5qKr5sB/fHUCX+bqf7jMtnn/9xWrP/
Ax200YqdvX+/hETeA0b//Schhy6CQjillwet8Dvh5lDmi1sJQsW51/zuw51pBgPvUCnuTdhhgufzrCsK

jl61A4cTx/z5s6Ff/v/Zz3//ykeeqEcti0oX0dxpzE3No5c7S9yE63JHb/bm2PCxYi/Ox8nTIbN7T611

14NPXnPh+a0yurz4ytuCB6x5Utn28FWVinVEz6k5rF
qUN3W1W8gEmnm7bc/s/t+0VzkOgfkc/j7z5LHC639H4oa6i+xfWfQ+y8A5psb20Yj8NfIao8UUDx9gO+

jh2bhXcOq6eN2E5Am1hffj3t+1XA/m/Vx1ULjJ3UX+x9Vd2y2+2/PfXMC/6st95/+dP0jGJxmk8tQeg+

rLXT7/2Zn/5jz/VV/ubwr3X/XlLj//bMu/WIDPeuHM
57MnX/7/2ZIv///syJf/fzbky/8/+/Osd5f0mmt90hdT30jneToPautrLNEqjxhUtm3Wa0wQ004wr/e0

O6aLdi3KH60ac/cu0t1w6r86339+XvtXidO/OvdY++4Sm9lwU3rHOdrXpo/b5bkZ2tPmg8gkr/pyf0MY

s3KAC57Ys+V63u+fY405289aS/p2Bz4xZcgoG6q9oQ
Qh6FRxwZV1IOpP9sa321otz+ZCRu5Ugc2zlPd1bUxsTMswNSWm4fn+AAuwAg/rDLsASgjOf4IecH/z92

WoLBf5yWyPU/AAnsCG6+B+A/wybUeY19Z5Cann6hvGeCf8nnze8boBm08t+Jo0li0kf/nmpPRnIbj4bz

dHViKra8tDTw8CuY68v035Ys4Lh131URw8Tvdk75RV
68GVfx/ZL0pLVBxUU1Kqy2OlZFsRjrDp75MlNF5C3ygZhhUUCvT7prTWPwyjHw6szLJir2s8ly2X39es

nxsId5u0MEGKEaQZaVTffb3KX2PUEW3dyHAIMCg4UU73LAd6tU5sLQO8M8Kpn/djYpxnz+ewBxjjbBhn

G/cOngfwL871F+cBWvkgn7YlaO7KkP64ojobdw6Duk
4zoVur9syuK48o2Oq+fvsj2U/7+frw+feTO/ns33Y5wepD/M7Xb8/pcqzb3sJye+rLPbbn3/52o44cEH

C/x17l/pt7c0h6eTQQX+1PxfSsfpsZ1R606Uvh+FEB/yrSvzp+MqE1pkcuXP15kl+PIteDee+1478OS3

cbwiw71Xd/o58nropx8W/M9+vYq/PWUdlPex5w8267
HvTEvb+bhgkn82Ur4XA7RPK0OwmKes1oHRtY9TzOd8hmVpG5injRx38u1mEpAj3F5tqX+lea2N/7Oqcw

6O40HLIs8p1ncNPufHx8wdsrb//N/yk2pcZ3qOdoxgj034a0544hjtwZ7bnVd2+2bgYiaMiddJw0imS9

UeydmUPhodFs99Wg5d5An+99YErJ42ezptZ9gaqHh2
x0Y871p3vwAgtL26kgppnHVTcrx1/ZfvvCfvvK/MMNwIr2fgj3Vsr0LwG+VV/xFiormQgR192rnNgtak

ON59wrk54sO/Oiylwrw3LsfdT65u3BZ1/Dpc0jU81AOL3hA1c0A+lveOugcw1OaitNr6mVJg00Iol3Kj

b977XGY708yvRwR/B8o+N2E7keqTHs27C15yTmz27V
Q3pnbK85gmo47Nq66plY/kitCuaKnom48oG/c87oD3XWTSrkLC/u5loTr1aRDQe8hvAQ9oEtVc4wsW89

JyvQNe2XjJJ3PC9lN5diCI5890Ap5imfaUEsQUnlBUuMQugP+XfU2PYAPkpy9q1D79k2WnudZ4G/fY5G

fpLX998Kfdx3uT2PwhToD0jZ+XesB/pbyPT85j/daa
1eaqfJ0zl65B1/6sz/PRZw72/s+IMQaDL78FuQUVgPUxj31R2A/q8vk14c84x3Tmy+1bYBPIFxv+bAAb

yAO89t+qDouJuEmy8pzB9zN5c7M7uW9smL1roa48bgqIWGDOZydbsC6xk/Moh5dMDTgS5LUGI2KBeA6v

p2xZclBdqffFkqo3ySk5o9dldqO8YkIxaIxnfluh6x
xf74iFbkg++OW+3d+en9p7dz+0X26E3G7KaiW/HSo5wJDUN+r6GQR48KIvIssnspRPuYYDmm3JpLt/vm

2g6umV1GwH62H4IrDT4a9De/+fryHw/3fXBCi1VbufuHsHlCLIgjLDpKf3qhc1d94ov01RdprHmHs944

n4BRhkk344g6wIrxUNci5e9n7rFfho5ra92+Xvblrh
/83Zzyksq85S4TLhyjBqcx+8U6Jm4OorunEgkVt57mnthWkf++0MWHtP4m/icf4Rdt0SgowV0Kj1p5n6

Zs32mwo0rQ3Vi91Bisb/3egnUwM/hzx4PVQWHWNVvuPFqYiy9e4vZ8XEA3WWnekkLV8eM+jjOCQNIf5f

a960J47imUc+0cA+zaZ1/m315v477tsULogyVBl/Cy
E6woKRwak7iu5kgeIsMqyQDImSG6muolY7lWk00BbnqQOsy1TWjpZgjTysmnSNYq+TWrcic/t1Jz7Yzj

rod84dkzzcrxutNows9MotmRlgl5h529rfiIg01nlXHJfvj8MMu+7JE5e448oqwRy+tnX+57NcHGfvbl

vhoItvazL/e1B1Kb+nUY9AJFtdFtB6iwf9Bcli9rdr
/3NTWZUj+fFC4FYaEG99zw+wd2rpnwo6NpxB38RkfSE4Pj97H1C2s2DtANf91BH9LZBYxaCtcS+6oEGO

3yyE+StBZVC1OBzgEUKjcOob4zW5wiSDLAns0r7f9JMZZLILGLmAZtDvVeEHr+Uv8vIUch7ERdyZZKil

aVe5+TopLviwTJIjl+bajqOTIM1ha0sw+87vpESQbJ
XDbnah0KSDxzDdM9cAh/1YQu0zLbvHP27O2zGHBwltlgwWWY9v7nWEac2ZuiFgxeIQIAjc+LqFy4I9Jj

KJWYX/R5WWqGKSeijjFJ1vNteOR7oWoXAoFDqz3vgTRXLXXogr1TEyjLgu4c5b75BkgU/UuEBG+jIpdM

Kln/Pay9Pa9Spy3kQXGBzSlKi2sbqU0BjNVETua948
M1Qmlnb3SmeJmA5KwF0AUppg9LAUojtN4R+ZMPBoa7O6hsLJ4Cwxc9hSlF5rXA108NVRiWWDHPcavHgt

CowaAGkxpMajCpwaQGkxpMw/BJAam7dp6aawuuf3+czOy/cP8RnObRkYhAp9qDFHK+s0QFP62kb2zA+9

XRJC6aPCPp6XXzQMUPw9oLI9HMQSp9DqayiUIz1iA/
HcGbeXk0kbXHpa0j8hc6r+pr0mx48BKV183Q/Hhf9T/kXMQEmt5Ss7DetAimxK82l8+Vef+9P4p9kAwU

KPtyXz+nOe6L6aGmYVV/k3gGgQEL0PVw///9ZPF/js4QGwUKWkFplcSPdYLZJWZb12LfiHcZQmXAJePT

T7i270l0AovKw6mvFfctBQ7fLJ+QPnkVBKTHVRUBuz
1tLsvgh87IJCLEDtjuv6HesFVnyH6NDL1c9ZIoWOt9sXa5rw4LGndFJMYCIdHZjNZMFnJxgFvLIYIHDj

wm1sdmaL+1nUrJHKgABuvouT9zamFFaBMa3O/oRLrsy/0+9Sr3T9W3J5irMas7hAMJ0W4+8jVl7n7pl5

8hsBmHowOnzLGAoGpN7qFyfmmo2usFlUD7yPfkgZkk
1WH3vqu4Wqi5rsnzSrtVyWwwomg/yKJQyL7PTAnK4XYbH4O2PjlSd+BRSGDwSqN7ZlzfM/x7CP4jo9/a

HPxvAB5HG3TbHycP6lSyrmLT28RODUW3tFIIVliUl0FTTpDB5BsixvgfCAUV2TLrE5aDxxrDzuGQsugL

/pGeN1eZS389KqapBPCNwY3qhB5pbWFQDtTPZao2XV
I1wBIhxELYWGcsONAhBUAtIEPAMXY6aJnZIUHRqUALzDTKTCXtF6vBvEHFblGz7ISukiYi9qOCyASCbk

krAGxMYBBZehZJKJAJq4YGMWyfm9ShdUJWoFYPZjEHCAEhAHwtY/wEUCWFx5gy1sK6xm/4K2kACdVVb1

TwlYcGFZje3xWbHa4cya7tHkKtyjIoyHrNeo1/Vie8
sCnJVWCAKkVrZxPGnmgflSETilyfZJ8DCXuZZ+TwD6bMVx1gQaf7iPml4IKjXR4nETM1+3K/j4H8HjaL

LccBicpV7zJz1tKOpHMekcpNddnoyAJcHnXsm4AreAQkJowZQ7uYViZda804bNCMW6XGrhrFLWowKzOw

3Eo4EVFBbfJf3tahaY/QMydELfTzGvtaTiawG5tAH/
WOllkcNzluMdTtXFNnD7zibHIldq0x0N+08hOLBP+n0++zL/z1kFVVY0/uyqrKvtzH+f2n+tJJ51utCV

7IvtzntJF/qi/6a3gxbbzWKJ0rBVcz/lfMcm8ilMy/o4OjjdjSw+ah/Srvlny8PIpm9e+Ki4pbNCF6rC

oyPBDhqMqHp/6nU/Hvnk6MuXLmhe+T1Q+7AQgwg7f9
otnya/pXeGDqQIE6mBfWYSoSwjAIXoKMUwxARovzsO0oGeDXK46BoOrTmz9KasjLimxvOtyJdBu7TIATDB

S3Xc5A2ymdiGwKQ8hYLcCANGyq6OBKkylOCvuZRwz3nBDz8fdHX3u1bigm24gsp6+5JemtVtq0b/MRLn

0WzsTvuxg3VN1aTE7v6cxESU1tJLQ2SotbnTxtVZLH
1SJJbgsufieExaWJZnm2Wyl9CNBbdr8SaGDGBwVluyUfAm0iALm+Ao+5FNYjMR6HVGDoMS5oeOtaFyzN

sevkrD2yX/a+bQbmg0bLf1U57wHvIN6wwLdWrk3FFsHjDUPSbgcSwrQANDRYMdJqo7kkvja/f6HDQ+ml

gzjHk4TSuTnv4h4RJhFxQ6ICeXMvUDYHZPQdSd2ah4
6JNtokKjSg48o3Bcxy+HSNO5GxkuFgRGibx0YFJI6LRT44bvHVxQUR0hJTQAylGmfKmoLG9KcECb6DT5

0T6zcFB3FDVK7OW3MuKh9xA5Z9FAlIiLksG8ZtQLYujSJSX6x9ONYaV/VCQHredzZS3pZc9B0vN5zDBJ

qmcv2g2lDQCxixZKikkx8CPNQOXbmkSnofDCxBid/J
5Y6SdW+mg3cTHbxqArTOB7bs4dqRwiOPw4eD43JcZro9Zn01PkH9oKPUGRrGxB+brYvcZfpPMSNrz8wK

ViVISMJsfRva1vYGivtVN7WVEMPzgkaohrfNfEqpmBLFfRxrjVXgHYGX/BSFQGv2obPdYqtrENBruFUR

8OPaGYuk0dd6oj2ZsEmRj6DVP6QtHs4SYoldOqPJug
tOW2qrRPWpTRwn4IlXg9KVNdBwWdIXZDW4MKd/RMVlaYYqr1otAVWnZzZqOdxOXQeiWmONcEo9yyDmBL

e7ZSWR2ndlAf81lXBI7lakCrqcICKslwsQcmvpno/pKv7raNjgxTAPRePUXP820cKJdGLPIwZHrYY8QR

QQwzAgtjG3A+5xZLlWWvQXwKqcS70ypf/ETH/64zRa
pAS05eAazLBOSClHggbqmyRWAXs14qzqyQJ1GM2WKhdQcxVoc6beCojftw1UXQ+TjbkminKY+9xd3eA9

/ceAHTvknbb9IgPKUb17d48Kibsnf1I4wruBBdHI0nsW/7YoI+hQAO7Rl6FsGnljFOsfw1Lfyq40Bvak

1VoR/uJs8EDV8hEWisl1IYn5aiLsW0PO3JzKexAeOw
YUAgf0aCV5qmMXHZIIzwFA9CSTH28oEM9TUbI/FrEJJZKRWsVHXK2Fc0EBdVnaVBPoXl1VQFmyDMefP1

qCLK1xgokNAEC/WjssHPufsPhttLrT8wVLw/XdkLamMnRuwjuVfTsEJe9UuORsj7LdVPUnSqSfKMYFnf

Qh9GFnfLLwQ65PLDG1RAyARf9sgIO0QMcegBrfjJon
v9knOgalEIkQaDM9OQdENzHvNXUKWNxZ6FHFFWLjbmvzR2xni6LNx88REhHaUbBuANfEMe8nA4D4Fx79

cxujFPMf8KJV6hBQXder1T9xoeg1nyCh/9wDF31qmZl4wz3n8R21kFU36yEPC81BqppuTCPBarRt7AIA

slkDaiLBYTlFkjISH1x2h/ljYyAWZ0baEdIBMI7d7t
lKLVk6u3ySfsmf0EoTY+1D03uJi/jzq0CT11wM0ZpGDzh1sLulZtIGk+qCiTXQTFTJEpCkWlWChWCuzF

wpjE23nRqrMqjuS6H1XAMdQmSuvLjARfQZwtMZMIDYoV8E/UePpv4/G/OcAO2MDdhSwG1dxAMQOnKGvM

4/5EYijJHEr/RjSaBCcPZB3xyO0rNfAzvRlJU1eIh6
dHHNnEHaO93OQCv9GuLNyBQhB6IN+7zS4vet3uNDnbAQVdlFdwNSCbXBqasVriW+nHzTbcaWVPrAkGqe

wLrB7nskoC4iNDkws1OHUEA4McLURrfpiGcdsRTPwmK53fDFf7LyuTqOXxulxcrXExunPdcPiuWVtOOM

EarPh+DBiYHcAvF8Z3lPdbw+ODH50f/SqB7EjoRZt8
aSVJ3yf69aIHUUueSo52SucIlif3ATWC62eS9o8jjG0SczcVNqgiryyrq764izX0UXzGTsEZ3ImuLs82

VNgciwtIGddJrEGquGgaq/+Sbx2MgvhvaXhbLszQkFxkbhHHzuoafzMHQh/QCGKc9qxRjII8GB1LY4C/

YhLVr2jbGmztgDDY9zW6gIjtKG+gQHesea23qttxkA
DL5txXF+y/TV6bLTTXiiyHM1Mz4YLayC43g9Da9RZtDvdg+DiqenV5tvNVq6sQT1N8mxkKbofocNEyN2

NgFsdQV0hWd8cDjJM8tx9XmrKkeRdqIEhPFeDGQZt5TQuBvrvxrkI983xCdlz6Hv8F89H6q94bZroLnw

IxP9XE1qrPxxpUAX0dkS3qwkF9bryl/ZTvZXzVVVzu
kkp9QTpRtcd6Ccaj2vHoRF8iHI3oXZ+aokaYtY6L8YIDMwSAkN8aB1wcsb3LFGlEH2rSyNx85J5P5SHt

bnE5PXsn33dbqy4Twwfo1VmVAEsTLvkY96skI2hYszOUZvBtL2oepfDcwv9+uJ0PbjQPgrbG1ua9udL9

XvqYRXjiDvvOs6lqi3Xw7ggVXAwmtm3JHBEjFJJpRU
0Uv5GJC/Six/8WHQr6hwJE474bDIhA/jZ5fVeGIAMRZ69Dm3nXasJfDBjetL7AuWCma6oTB3N253AxIL

oppV6kPTxLXaPkzHpJqiQUpbwCxOTSmIEaPGrgOTcoyucfQZpqSyeFp23ORlV3b0TsduWYQTEEqriFWN

ilC7mWgg23QChOwUIFFhqXXwqU8bLvddOWzY0YAfGK
kROZIkUMfqhPhM8S/IfJdCzAX2fE7DpQmIWiPhcUjfDmkBmgDbexAED6Dr0McoAsnXA4caYnYj09AXzx

hW0fx558XUpJXnPVn/ZBWRwtUVf8HgHI/Ak2Ce2lUzuARywuokaCL5aqHEaGQSHnNrNCcOg9zg9mDKbc

ApHUzF3GGH7pflhYFGinZSyXdM2moCmjDyoGIDVsCN
EMOcFvEZJVDltsUEEXrSaQYnsfJOwHWDQg7FWV8TIvRBkIWqPJkchAW8FltDE2NWGGXWXx5oqYlguqGA

Kw8a3njeZEmXzSDzDNkIoCtyU90XqFQhBq1VEw3kFfcEFPUQgI1OwHv9EtwR49sM33uSEYotfpO6Kfx0

Tz4tdBVRPqF+laFeqpci8s5a25gNyuhMIGUjs/ilsx
+057JhYWw88YqnTUqnlqxTv6XBYO4+Y7JhAl8b90bIENdAEQCDnaeHLolOvWyYWkkBGHTYWAmrL5jcPY

nBOMNayC4jPiyJ+IY+mGb+kCUt9fhWAFR2YAux7TP3SfVrt/jjkWWWfG/zWmnnAHqIXBk5qfwGKvcanH

Kbn3HRAtN9W4tpLEnlsPajOSN4RKdRpuUc2gahSr2y
bE71y0J8zAxCM3Skcj0HkSZHMUkcqzzHVRzjs4VJ1wxZUq1Ic8isMsY35Mm7VBd9p0f0LptbvPAzsMFn

1QppLC7zJZUkQf0tMZrOs+qSCoyA2fhl6mZYRnb48/R56yi5jkkHBn/kO9LAzYvqP2h8FNIP8Uqy9tG9

fnBg1c6COdE/B6rL1Lq0koR+7JnTK7W0Vdw0mV5AA2
e3+iGHAJFNQxDMiMP7SLq3QvzGo+4qjv3cDP0xE7YEf7k7KcNJBr3BoZq4TJ2CQDSTyycYV8rjpfNVpS

Kz6iNGL2gWkZB9V+quZctVs97wKc2N0Zo0V/LDcN6FaxMJLHrrWXPP2nH3bIicUPG0/zD737K3Bl3Xht

8K5UDgd1lmVo6gGSOTBMTwAYCsEsDFVWJOw2Pqy9tY
kCLKgBthx5mnDi1HAGe6JtvjnHso0Lj0Hb8rLqOfk2oSu4iI6irPHYblLmVFPB4kuagbFQ2DXi8m7j1w

gcI5h+stgBke6K6QcOm9bLcXROrDe6j5CWE00IaBtBcNEmOmmUcxDOE2SoDAWvjkhoWyLzVLsQKcF6Sj

83GhJAAp8SH6oBHGpv9iXvbrE6au6pZQFTIfD3fLiP
iFSbd3JQKPklxIXkEsIRGncp2mUO1vVoZDcDBeGZL0KWbEvLP6cWRJkFcl5W3XvQq6rAjCkMORA6wqoh

6IBa2nkQKQiufl5L1Sk7R8a+mmAnBMIhNskkwJVnELX2cl6QV+Jf29q922OzqI67PveWX2THaU/YRUJp

4qfm48bpnapZ6cTtAiofdAuGrEbhxd6dcD9SaDLemr
xjPO1hqtAXmreBFODArpN3pNImh8N7Pr94TR2gmZCUmr/cQMtQ7jlPvBLocdJJMY/VoDV13ukeAhPJ8D

mYZcflHV3UBN9p8YEcO2IkVv3F3agysw5DQsXTYilwOL1kIuKZU6AVj+GvBLJd9w81iyy9PHRKAIh5ax

ZiNRTEQFs2JwOkkPSjiTCm4f52A4zW2tG2O4xwpAJy
sScrCnjnKDgrLMyx+OyfKH6IFIyaaLsxXWTo3c4HpPrLt0e7ZlgrKEf4WXlq6CN8fP8c+lJ7h2PUHmKg

5zrB88JrTN4Y7JVH6GyiN+dkALEkRTlZedkYO2zaptTObNSLEGuoJhSmtf6EiHZumQToHKbILQ2t6P5h

UU7Lbmp6Frkm5ebQxLXIFFhyT3pdEr1VfzbQH6ym5i
RBPyoS6iL3Sp66To/lvvY6R3UiU/sDuGo/hf9dnFozhTR53JRazEa5y3Cz7lluivxfa0eAS4AffhzQja

WAf7ZlS13+Dntb5im0q7x9b+Vx70d2FnACOcnyldZgKnf8Hujx0jxb5EzvyGuR8NuCQ0aH0gj9sHx4SL

HGiDsIJt28br/LFff3cobjzN5Z36X1jD8ljl2Q93+v
Zp54nuL3+g0pg67b63EVz7g5qBophc9RKeIZbJAC36ef2fqn4Kqn/qz9TfOnkEweooKA/0dlkdj8419m

/mY7s2cn2n/Robert+j934z6w3bPJaohGB/OBxur3+4w5snblb8gQt+jkRjNe91bEllPJNFogmLIoskF6b3

r30uNc3u5zlps0w6+ff7l6+/ujaCkhrm97r/nb+9PJ
x/p59d3f/0w8AQ9Pl7nxwCi9pD7/xLw3gajd8aJo9+vjr/DfkQU2KDJIlv/Soxwp2Vtdn9iHr5oVt8uh

wlGreo1hkw+dWr8+8xED5tP8kE1/Sji8+f/uByu5hMuq4/+/vh1ed/u9i31l9g0sr/Usv6EOl138c8+K

fD50+RMZ6rg8xBDM+inUU+nL/+g/22hbVTiVE+fnz+
f2D0GiQJAhQq+eNbpB/kp8eNc8HEcxNbe6vzgk7d1anC1Y11AzGV6hwVK7cy3U/xoMM0RheXuzz6f6R7

XEnPB19V72Ttsbp74hDfMWqjf6bUEkmYO223xOy1BJErzD14o/mru015+CQvs4yz7988aIC3tTYHRjdP

po+00zljcRTsvZm9Y8q5DYk48MvUy8+9+/krxXejPC
5fmq2r97ss2yU+v707/PGpsi0fWc59Mqj7mqRV5TizO/v+/vn39++RfhyjA+lH7+322s6X7PjVj709S5

+Zn1R+V9uMrOoAS5fNvpok8Iq5zLZ/ePf/D7/8se/v7TZPz/9zq6ggtc8ic5njgZ+/f/nnH38u80x2i3

eP735+8FLzKnoRj2/ev/wea7hWyclhO9Oc294sec8u
Hi/8HVrb4esX46/DlixmxnMmzwLg7f4/ffrw/oxCz0FON1/eupyP1c1u13WP/BIBDMc1GK6Ra5+bd/98

UXSB3879doYgFho08q2LpnWDL+Vw+eL+0AXdIxqdlC6CcXk6H9ec/uSntOPr+bgg2ltRI6w8X7/j6JvP

Rweyl/7fX5HnHUhuqyCzpONdVH8BCF4ww4EaOcU7DR
Yei2PvPTknWNj9aCJcWPqrilChs7YkdkpjD7Mti2JoPoEsGHTkJxRvFak0ETEvVoA9cWKsQeVnAQAoL8

HyTAIqFsI2OaFiNFQVLM8HFTDbxuBeTxJsLRT+LnUoJX8fxgttWWGkh2TvMBplYEthPRYyO8S6nKagDP

UgY7JftG94ZIKzI3WhoqN8SNW4HDSnXwDsHBMofTQn
OSAwIFI+KyGaIE6nwaxrNTNxz3EaQZzwKIG0wU3yHBoVXEPDQSaFOIkgFgV9q72jjnMQMUNuSVTvHM0C

joPmkGscnsAusBSjPLH6TtTzVCFxHIYwCQM0APDCIFRpTENaDAXsMIUnZ8DtjFwoUYtQEHKTOCkESHyc

KkPmh1A8LGGfxdGJFZFUPGOMAICNVNAWLFMIWZIbGU
UxMIL1ODSjDN7VwBNxFOI5PRvOYVOOMEqTFWopVoQwj4Q1WOEtS5KoBWLmafQlMBUDONefWipeOT0juM

jqxngkY9FedBEtVGHYCNTrSEWzTXRgQNYlL5Pox7S7N5CgVZdPLSSMUCcEPCosGtW6o19dhqWHKJMkLA

MpID4+SA4gg4MgUy5QHXWcYQ9hdov2RI7YjLMrIM5Y
YTpinfFoK0iepkXsMxSbKSEKYL3tS6AjfT78NBR+KpPdVL6fzcr3okJbDlJqQZBhLHTmMLYkYTpvXFPd

FSLeFRGhKVJ1JDV8CEAaYxDkEBKpYPC1QOQkYUQlHHLkzpLTYHEgKEQ9UVT7WaSlKLSnEEBfIStrUPVl

NLDrGgWaAPPuQJTgUS4vVzRlJIZlKRQqSACsVhW3Zq
SqOwVQFILgZTBjLZKwJfExKCZpJQZcCOxsYNOrHCSoTLg0WCIlQJJwEP7aKcPaTTLgSRCtJDYvTAYzZP

IjcoDHSFEbFQWcNES6VFSiOQFyXVZePSadKNOkYZJeJCI2DFBqYSZrMP6nMqBuVFLgERW3OjGgINBtXQ

TzwxEAWUYsJUPuNHG6JRRbRIWyQCVfFLebCPPpAMIv
FzA3HOXgSJXlIG5iDgJlENEbLXG5UAHkAWTgSZJfwhSGWDKpJRMdHIg8VlPsXNGgWNUoGVhpUOGuFNKo

DBmqGAHwOOVvXI6vPyOqFAAaYVEeCLZzLWWkDMWyhlNFXSZeODDdGXC7YtDqJLNgNSDkONfgYPXtYHOs

VQQ3ZLJkOMQyZQ9qLvUoBVXfJRZtVBOcKVAhSJGlsp
XHVVHxHOLfYWEbZGKsUXHfSTPoJPalEONsLDMjEtN0PDAeGRUvXT7aBuIdCFPsADM8ABCsVIAtBQAujz

TOQAGiEIXrUFScCPU3WXYxWVSnWEx5jsIpfSYdLga6Uu5MdOldFLL7Rv7CnaNzFXSjONISBk2Ev679FY

[facility]+UjxbuHHfgUukMCZAMVW3ZcqDSFLDD7E=









                    ID                  Date                Data Source

 

                    [id number]            2020 12:00:00 AM EDT [facility]









          Name      Value     Range     Interpretation Code Description Data Hannah

rce(s) Supporting 

Document(s)

 

                                        SARS coronavirus 2 RNA [Presence] in Res

piratory specimen by PRISCILA with probe 

detection                                              [facility]      

 

                                        This lab was ordered by Anupama Yee and reported by via[facility] 

[facility]. 









                    ID                  Date                Data Source

 

                    [id number]            09/15/2020 11:27:00 AM EDT [facility])









          Name      Value     Range     Interpretation Code Description Data Hannah

rce(s) Supporting 

Document(s)

 

           Laboratory test finding (navigational concept) Laboratory test result

                                  

[facility] ([facility])    









                    ID                  Date                Data Source

 

                    [id number]            09/15/2020 11:27:00 AM EDT [facility] ([facility]

[facility])









          Name      Value     Range     Interpretation Code Description Data Hannah

rce(s) Supporting 

Document(s)

 

          WBC       7.1 x10E3/uL 3.4-10.8                      [facility] ([facility]

[facility])  

 

          RBC       4.30 x10E6/uL 3.77-5.28                     [facility] ([facility]

[facility])  

 

           Hematocrit [Volume Fraction] of Blood by Automated count 40.2 %     3

4.0-46.6                        

[facility] ([facility])    

 

           Hemoglobin [Mass/volume] in Blood 13.4 g/dL  11.1-15.9               

         [facility] ([facility])                       

 

           Erythrocyte distribution width [Ratio] by Automated count 11.9 %     

11.7-15.4                        

[facility] ([facility])    

 

                    Erythrocyte mean corpuscular volume [Entitic volume] by Auto

mated count 94 fL               

79-97                                           [facility] ([facility]  

 

                          Erythrocyte mean corpuscular hemoglobin [Entitic mass]

 by Automated count 31.2 

pg           26.6-33.0                              [facility] ([facility])  

 

                                        Erythrocyte mean corpuscular hemoglobin 

concentration [Mass/volume] by Automated

 count     33.3 g/dL  31.5-35.7                        [facility] ([facility])  

 

           Lymphocytes/100 leukocytes in Blood by Automated count 25 %          

                              [facility] 

([facility])           

 

           Platelets [#/volume] in Blood by Automated count 232 x10E3/uL 150-450

                          [facility]

 ([facility])          

 

           Neutrophils/100 leukocytes in Blood by Automated count 65 %          

                              [facility] 

([facility])           

 

           Monocytes/100 leukocytes in Blood by Automated count 7 %             

                            [facility] 

([facility])           

 

           Basophils/100 leukocytes in Blood by Automated count 1 %             

                            [facility] 

([facility])           

 

           Eosinophils/100 leukocytes in Blood by Automated count 2 %           

                              [facility] 

([facility])           

 

           Immature cells [#/volume] in Blood Laboratory test result            

                      [facility] 

([facility])           

 

           Monocytes [#/volume] in Blood 0.5 x10E3/uL 0.1-0.9                   

       [facility] ([facility])                       

 

           Neutrophils [#/volume] in Blood by Automated count 4.6 x10E3/uL 1.4-7

.0                          

[facility] ([facility])    

 

           Lymphocytes [#/volume] in Blood 1.8 x10E3/uL 0.7-3.1                 

         [facility] ([facility]                       

 

           Basophils [#/volume] in Blood by Automated count 0.1 x10E3/uL 0.0-0.2

                          [facility]

 ([facility])          

 

           Eosinophils [#/volume] in Blood by Automated count 0.1 x10E3/uL 0.0-0

.4                          

[facility] ([facility])    

 

           Immature granulocytes/100 leukocytes in Blood by Automated count 0 % 

                                        

[facility] ([facility])    

 

           Morphology [Interpretation] in Blood Narrative Laboratory test result

                                  

[facility] ([facility])    

 

                    Immature granulocytes [#/volume] in Blood by Automated count

 0.0 x10E3/uL        

0.0-0.1                                         [facility] ([facility])  

 

                          Nucleated erythrocytes/100 leukocytes [Ratio] in Blood

 by Automated count 

Laboratory test result                                        [facility] ([facility])  









                    ID                  Date                Data Source

 

                    [id number]            09/15/2020 11:27:00 AM EDT [facility] ([facility])









          Name      Value     Range     Interpretation Code Description Data Hannah

rce(s) Supporting 

Document(s)

 

          Glucose   108 mg/dL 65-99                         [facility] ([facility]

[facility])  

 

          Creatinine 0.94 mg/dL 0.57-1.00                     [facility] ([facility]

 [facility])  

 

           Urea nitrogen [Mass/volume] in Serum or Plasma 18 mg/dL   8-27       

                      [facility] 

([facility])           

 

          eGFR If NonAfricn Am 59 mL/min/1.73                               [facility]

[facility] ([facility]) 

 

 

           Urea nitrogen/Creatinine [Mass Ratio] in Serum or Plasma 19         1

2-28                            [facility] 

([facility])           

 

          eGFR If Africn Am 68 mL/min/1.73                               [facility] 

([facility])  

 

          Sodium    140 mmol/L 134-144                       [facility] ([facility]  

 

           Chloride [Moles/volume] in Serum or Plasma 103 mmol/L          

                  [facility] 

([facility])           

 

           Potassium [Moles/volume] in Serum or Plasma 4.7 mmol/L 3.5-5.2       

                   [facility] 

([facility])           

 

           Carbon dioxide, total [Moles/volume] in Serum or Plasma 25 mmol/L  20

-29                            

[facility] ([facility])    

 

           Calcium [Mass/volume] in Serum or Plasma 9.7 mg/dL  8.7-10.3         

                [facility] 

([facility])           









                    ID                  Date                Data Source

 

                    [id number]         2020 06:05:00 AM EDT [facility]









          Name      Value     Range     Interpretation Code Description Data Hannah

rce(s) Supporting 

Document(s)

 

          WBC       7.1 x10E3/uL 3.4-10.8                      [facility]    

 

          RBC       4.30 x10E6/uL 3.77-5.28                     [facility]    

 

          Hemoglobin 13.4 g/dL 11.1-15.9                     [facility]    

 

          Hematocrit 40.2 %    34.0-46.6                     [facility]    

 

          MCV       94 fL     79-97                         [facility]    

 

          MCH       31.2 pg   26.6-33.0                     [facility]    

 

          MCHC      33.3 g/dL 31.5-35.7                     [facility]    

 

          RDW       11.9 %    11.7-15.4                     [facility]    

 

          Platelets 232 x10E3/uL 150-450                       [facility]    

 

          Neutrophils 65 %      Not Estab.                     [facility]    

 

          Lymphs    25 %      Not Estab.                     [facility]    

 

          Monocytes 7 %       Not Estab.                     [facility]    

 

          Eos       2 %       Not Estab.                     [facility]    

 

          Basos     1 %       Not Estab.                     [facility]    

 

          Neutrophils (Absolute) 4.6 x10E3/uL 1.4-7.0                       [facility]    

 

          Lymphs (Absolute) 1.8 x10E3/uL 0.7-3.1                       [facility]  

  

 

          Monocytes(Absolute) 0.5 x10E3/uL 0.1-0.9                       [facility]

    

 

          Eos (Absolute) 0.1 x10E3/uL 0.0-0.4                       [facility]    

 

          Baso (Absolute) 0.1 x10E3/uL 0.0-0.2                       [facility]    

 

          Immature Granulocytes 0 %       Not Estab.                     [facility]

    

 

          Immature Grans (Abs) 0.0 x10E3/uL 0.0-0.1                       [facility]

p    









                    ID                  Date                Data Source

 

                    [id number]         2020 06:05:00 AM EDT [facility]









          Name      Value     Range     Interpretation Code Description Data Hannah

rce(s) Supporting 

Document(s)

 

          Glucose   108 mg/dL 65-99     Above high normal           [facility]    

 

          BUN       18 mg/dL  8-27                          [facility]    

 

          Creatinine 0.94 mg/dL 0.57-1.00                     [facility]    

 

          eGFR If NonAfricn Am 59 mL/min/1.73 >59       Below low normal        

   [facility]    

 

          eGFR If Africn Am 68 mL/min/1.73 >59                           [facility]

    

 

          BUN/Creatinine Ratio 19        12-28                         [facility]  

  

 

          Sodium    140 mmol/L 134-144                       [facility]    

 

          Potassium 4.7 mmol/L 3.5-5.2                       [facility]    

 

          Chloride  103 mmol/L                         [facility]    

 

          Carbon Dioxide, Total 25 mmol/L 20-29                         [facility] 

   

 

          Calcium   9.7 mg/dL 8.7-10.3                      [facility]    









                    ID                  Date                Data Source

 

                    [id number]            2020 11:38:00 AM EDT [facility]









          Name      Value     Range     Interpretation Code Description Data Hannah

rce(s) Supporting 

Document(s)

 

          Troponin  0.02                                    [facility]  









                    ID                  Date                Data Source

 

                    [id number]            2020 09:47:00 AM EDT [facility]









          Name      Value     Range     Interpretation Code Description Data Hannah

e(s) Supporting 

Document(s)

 

          Troponin I 0.02 ng/mL                               [facility] ([facility])  

 

                                        <content>Troponin I Reference Interval f

or Siemens Hinton 

LOCI:</content><br/><content></content><br/><content>99th Percentile= 0.00-0.045
 ng/ml</content><br/><content></content><br/><content>Risk 
Stratification:</content><br/><content><= 0.10 ng/ml   Decreased Risk for 
Adverse Clinical</content><br/><content>Events.</content><br/><content>0.10-1.50
 ng/ml   Increased Risk for Adverse Clinical</content><br/><content>Events. 
Evaluation of additional</content><br/><content>criterion and/or repeat testing 
in 2-6</content><br/><content>hours is suggested to rule out 
myocardial</content><br/><content>damage.</content><br/><content>>= 1.50 ng/ml  
 Indicative of Myocardial Injury.</content><br/><content></content> 







                                        Procedure

 

                                          



                                                                                
                                                                                
                                                



Social History

          



           Code       Duration   Value      Status     Description Data Source(s

)

 

             Smoking      2020 12:00:00 AM EDT Patient has never smoked co

mpleted    Patient 

has never smoked                        [facility] ([facility])



                                                                                
                  



Vital Signs

          



                    ID                  Date                Data Source

 

                    UNK                                      









           Name       Value      Range      Interpretation Code Description Data

 Source(s)

 

           Body mass index (BMI) [Ratio] 23.0 kg/m2                       23.0 k

g/m2 [facility] ([facility]

 

           Heart rate 68 /min                          68 /min    [facility] ([facility]

 

           Diastolic blood pressure 82 mm[Hg]                        82 mm[Hg]  

[facility] ([facility]

 

                                        white coat 

 

           Systolic blood pressure 140 mm[Hg]                       140 mm[Hg] M

[facility] ([facility]

 

                                        white coat 

 

           Body weight 130.00 [lb_av]                       130.00 [lb_av] [facility]

T ([facility]

 

           Body height 63 [in_i]                        63 [in_i]  [facility] ([facility]

 

                                        5'3" 

 

           Diastolic blood pressure 64 mm[Hg]                        64 mm[Hg]  

[facility] ([facility])

 

                                        sitting 

 

           Systolic blood pressure 134 mm[Hg]                       134 mm[Hg] M

[facility] ([facility])

 

                                        sitting 

 

           Diastolic blood pressure 64 mm[Hg]                        64 mm[Hg]  

[facility] ([facility])

 

                                        sitting, regular cuff 

 

           Systolic blood pressure 136 mm[Hg]                       136 mm[Hg] M

[facility] ([facility])

 

                                        sitting, regular cuff 

 

           Respiratory rate 16 /min                          16 /min    [facility] (

[facility])

 

           Heart rate 52 /min                          52 /min    [facility] ([facility]

[facility])

 

                                        Regular 

 

           Body mass index (BMI) [Ratio] 22.8 kg/m2                       22.8 k

g/m2 [facility] ([facility])

 

           Body height 63 [in_i]                        63 [in_i]  [facility] ([facility]

[facility])

 

                                        5'3" 

 

           Body weight 129.00 [lb_av]                       129.00 [lb_av] RINK[facility]

T ([facility])

 

           Diastolic blood pressure 70 mm[Hg]                        70 mm[Hg]  

eCW1 ([facility])

 

           Systolic blood pressure 120 mm[Hg]                       120 mm[Hg] e

CW1 ([facility])

 

           Body mass index (BMI) [Ratio] 23.76 kg/m2                       23.76

 kg/m2 eCW1 ([facility])

 

           Body height 62.5 [in_us]                       62.5 [in_us] eCW1 ([facility])

 

           Body weight Measured 132 [lb_av]                       132 [lb_av] eC

W1 ([facility])

## 2021-02-15 NOTE — REP
INDICATION:

abdominal pain.



COMPARISON:

None.



TECHNIQUE:

Three-view abdominal series including upright chest x-ray.



FINDINGS:

Upright chest radiograph is unremarkable.  There is no evidence of infiltrate or free

subdiaphragmatic air.  Heart size is normal.  Pulmonary vasculature is not increased.

Pleural angles are sharp.



Supine and erect views of the abdomen demonstrate a normal bowel gas pattern.  Psoas

margins are obscured by bowel gas.  There is no evidence of mass, organomegaly, or

pathologic calcification.  Monitoring electrodes overlie the abdomen and chest.  There

are degenerative spondylosis changes in the lumbar spine.



IMPRESSION:

Negative acute abdominal series.





<Electronically signed by Joey Doe > 02/15/21 [id number]

## 2021-02-15 NOTE — REP
INDICATION:

left sided abd pain.



COMPARISON:

Comparison CT study of the abdomen is from December 19, 2010..



TECHNIQUE:

Oral contrast is administered.  Helical scanning is acquired following the intravenous

injection of 100 mL of Isovue 370.  3 mm axial images re-formatted.  Coronal and

sagittal MPR images are generated.



FINDINGS:

Preliminary digital  radiograph demonstrates an unremarkable bowel gas pattern.

There is a dextroconvex lumbar curvature.  The lung bases are essentially clear.

There is mild diffuse fatty infiltration of the liver.  No focal liver lesion is seen.

Spleen is unremarkable.  Normal adrenal glands are observed.  The pancreas is

unremarkable.  No abnormality is noted in the gallbladder.  The kidneys enhance

symmetrically.  No renal mass or hydronephrosis is seen.  There is a tiny intrarenal

calculus in the lower pole collecting system of the right kidney, approximately 2 mm.

No ureteral calculus is observed.  No bladder calculus is seen.  No retroperitoneal

mass or adenopathy is seen.  Normal caliber aorta.  Small and large bowel loops are

normal in the upper abdomen.



Pelvic CT images demonstrate a normal air-filled appendix in the right lower quadrant.

There is extensive diverticulosis affecting the sigmoid colon without CT evidence of

diverticulitis.  No abnormal fluid collection or free air is seen.  Urinary bladder is

intact.  The uterus is surgically absent.



IMPRESSION:

Moderate left colonic diverticulosis affecting the sigmoid colon.  Mild fatty

infiltration of the liver.  Intrarenal nephrolithiasis right kidney without

hydronephrosis.  No other abnormality seen.





<Electronically signed by Joey Doe > 02/15/21 [id number]

## 2021-02-16 NOTE — ECGEPIP
[facility] - ED

                                       

                                       Test Date:    2021-02-15

Pat Name:     LISANDRO MEHTA         Department:   

Patient ID:   [id number]                 Room:         -

Gender:       Female                   Technician:   [email or web address]

:          1945               Requested By: CAROLINA PRINCE

Order Number: [id number]     Reading MD:   Armando Salas

                                 Measurements

Intervals                              Axis          

Rate:         75                       P:            85

GA:           187                      QRS:          85

QRSD:         77                       T:            68

QT:           369                                    

QTc:          414                                    

                           Interpretive Statements

SINUS RHYTHM

POOR R WAVE PROGRESSION

BASELINE ARTIFACT AFFECTS INTERPRETATION

Electronically Signed on 2021 7:20:39 EST by Armando Salas

## 2021-03-19 ENCOUNTER — HOSPITAL ENCOUNTER (OUTPATIENT)
Dept: HOSPITAL 53 - M LAB | Age: 76
End: 2021-03-19
Attending: FAMILY MEDICINE
Payer: MEDICARE

## 2021-03-19 DIAGNOSIS — I10: ICD-10-CM

## 2021-03-19 DIAGNOSIS — E07.9: Primary | ICD-10-CM

## 2021-03-19 LAB
ALBUMIN SERPL BCG-MCNC: 3.9 GM/DL (ref 3.2–5.2)
ALT SERPL W P-5'-P-CCNC: 19 U/L (ref 12–78)
BILIRUB SERPL-MCNC: 0.8 MG/DL (ref 0.2–1)
BUN SERPL-MCNC: 23 MG/DL (ref 7–18)
CALCIUM SERPL-MCNC: 9.2 MG/DL (ref 8.8–10.2)
CHLORIDE SERPL-SCNC: 109 MEQ/L (ref 98–107)
CHOLEST SERPL-MCNC: 161 MG/DL (ref ?–200)
CHOLEST/HDLC SERPL: 2.44 {RATIO} (ref ?–5)
CO2 SERPL-SCNC: 28 MEQ/L (ref 21–32)
CREAT SERPL-MCNC: 0.86 MG/DL (ref 0.55–1.3)
GFR SERPL CREATININE-BSD FRML MDRD: > 60 ML/MIN/{1.73_M2} (ref 39–?)
GLUCOSE SERPL-MCNC: 102 MG/DL (ref 70–100)
HCT VFR BLD AUTO: 41.1 % (ref 36–47)
HDLC SERPL-MCNC: 66 MG/DL (ref 40–?)
HGB BLD-MCNC: 13.4 G/DL (ref 12–15.5)
LDLC SERPL CALC-MCNC: 76 MG/DL (ref ?–100)
MCH RBC QN AUTO: 31.3 PG (ref 27–33)
MCHC RBC AUTO-ENTMCNC: 32.6 G/DL (ref 32–36.5)
MCV RBC AUTO: 96 FL (ref 80–96)
NONHDLC SERPL-MCNC: 95 MG/DL
PLATELET # BLD AUTO: 239 10^3/UL (ref 150–450)
POTASSIUM SERPL-SCNC: 4.6 MEQ/L (ref 3.5–5.1)
PROT SERPL-MCNC: 6.8 GM/DL (ref 6.4–8.2)
RBC # BLD AUTO: 4.28 10^6/UL (ref 4–5.4)
SODIUM SERPL-SCNC: 140 MEQ/L (ref 136–145)
T3 SERPL-MCNC: 136.7 NG/DL (ref 60–181)
T4 FREE SERPL-MCNC: 1.06 NG/DL (ref 0.76–1.46)
TRIGL SERPL-MCNC: 97 MG/DL (ref ?–150)
TSH SERPL DL<=0.005 MIU/L-ACNC: 3.5 UIU/ML (ref 0.36–3.74)
WBC # BLD AUTO: 6 10^3/UL (ref 4–10)

## 2021-04-20 ENCOUNTER — HOSPITAL ENCOUNTER (OUTPATIENT)
Dept: HOSPITAL 53 - M RAD | Age: 76
End: 2021-04-20
Attending: FAMILY MEDICINE
Payer: MEDICARE

## 2021-04-20 DIAGNOSIS — J32.9: Primary | ICD-10-CM

## 2021-04-20 NOTE — REP
INDICATION:

SINUSITIS.



COMPARISON:

None.



TECHNIQUE:

Axial CT images with multiplanar reformations.



FINDINGS:

The paranasal sinuses are clear and fully aerated.

Nasal septum near midline with a shallow leftward nasal spur.



IMPRESSION:

Clear sinuses.  No CT evidence of sinusitis.





<Electronically signed by Abad Mcgregor > 04/20/21 [id number]

## 2021-05-01 ENCOUNTER — HOSPITAL ENCOUNTER (OUTPATIENT)
Dept: HOSPITAL 53 - M RAD | Age: 76
End: 2021-05-01
Attending: FAMILY MEDICINE
Payer: MEDICARE

## 2021-05-01 DIAGNOSIS — R42: ICD-10-CM

## 2021-05-01 DIAGNOSIS — I67.82: ICD-10-CM

## 2021-05-01 DIAGNOSIS — R51.9: Primary | ICD-10-CM

## 2021-05-02 NOTE — REPVR
PROCEDURE INFORMATION: 

Exam: MR Head Without Contrast 

Exam date and time: 5/1/2021 2:03 PM 

Age: 75 years old 

Clinical indication: Pain; Headache; Tension; Patient HX: H/a dizziness; 

Additional info: Headache, dizziness 



TECHNIQUE: 

Imaging protocol: MR of the head without contrast. 



COMPARISON: 

No relevant prior studies available. 



FINDINGS: 



Brain: Scattered nonspecific T2/FLAIR hyperintensities of the periventricular 

and deep subcortical white matter, most likely secondary to chronic small 

vessel ischemic change. No intracranial hemorrhage or extra-axial fluid 

collection. No evidence of mass effect or midline shift. No restricted 

diffusion to suggest acute infarct. 

Cerebral ventricles: Mild prominence of the ventricles and sulci, likely 

attributed to parenchymal volume loss. 

Bones/joints: Unremarkable. 

Paranasal sinuses: Normal as visualized. No acute sinusitis. 

Mastoid air cells: No mastoid effusion. 

Orbital cavity: Unremarkable. 

Soft tissues: Unremarkable. 



IMPRESSION: 

1. No acute intracranial pathology. 

2. Chronic findings, as above. 



Electronically signed by: Jerry Coyne On 05/02/2021  16:21:14 PM

## 2021-05-16 NOTE — CCD
3\" acr wrap applied to rt ankle in  fugure 8 design , with aircast.  Then  Sized to crutches  Crutch return well demonstrated.   Good circ to RT ankle   Good sensitivity present   Limited motion present due to injury   Taught R>I>C>E>   Taught to check cap refill times   Cap refill times < 2 sec x 5 rt toes   In good spirits   W/C out to car with MOther   Continuity of Care Document (CCD)

                             Created on: 2021



Sameera North

External Reference #: [id number]

: 1945

Sex: Female



Demographics





                          Address                   [address] [place]  [address]

 

                          Home Phone                [phone]

 

                          Preferred Language        Unknown

 

                          Marital Status            Unknown

 

                          [employer] Affiliation     Unknown

 

                          Race                      White

 

                          Ethnic Group              Not  or 





Author





                          Author                    Sameera BARKER DPM

 

                          Organization              Unknown

 

                          Address                   [address] [place] [address], [facility]

 [address]

[place]  [address]



 

                          Phone                     [phone]







Care Team Providers





                    Care Team Member Name Role                Phone

 

                    Marco A Mccain M.D.                [phone]







Problems





                                        Description

 

                                        No Information Available







Social History





                Type            Date            Description     Comments

 

                Birth Sex                       Unknown          

 

                ETOH Use                        Denies alcohol use  

 

                Tobacco Use     Start: Unknown  Patient has never smoked  







Allergies, Adverse Reactions, Alerts





                                        Description

 

                                        No Known Drug Allergies







Medications





           Active Medications SIG        Qnty       Indications Ordering Provide

r Date

 

                          Lisinopril                     2.5mg Tablets          

         Take 1 Tablet By 

Mouth Once Daily                                 Unknown         

 

                          Fluarix Quadrivalent                     0.5ml Michelle   

                Inject 

Intramuscularly as Directed                                 Unknown         

 

                          Brilinta                     90mg Tablets             

      Take One Tablet By 

Mouth Twice A Day                                 Unknown         

 

                          Atorvastatin Calcium                     20mg Tablets 

                  Take 1 

Tablet By Mouth Once Daily                                 Unknown         







Immunizations





                                        Description

 

                                        No Information Available







Vital Signs





                Date            Vital           Result          Comment

 

                2021  9:17am Height          63 inches       5'3"

 

                    Weight              130.00 lb            

 

                    BP Systolic         140 mmHg            white coat

 

                    BP Diastolic        82 mmHg             white coat

 

                    Heart Rate          68 /min              

 

                    BMI (Body Mass Index) 23.0 kg/m2           







Results





                                        Description

 

                                        No Information Available







Procedures





                                        Description

 

                                        No Information Available







Medical Devices





                                        Description

 

                                        No Information Available







Encounters





           Type       Date       Location   Provider   Dx         Diagnosis

 

           Office Visit 2021  9:15a [facility] Marcel Barker DPM 

M84.879    

Other disorders of continuity of bone, unsp ankle and foot

 

                          L84                       Corns and callosities







Assessments





                Date            Code            Description     Provider

 

                    2021          M84.879             Other disorders of c

ontinuity of bone, unspecified ankle and 

foot                                    Marcel Barker DPM

 

                2021      L84             Corns and callosities Marcel Barker DPM







Plan of Treatment

Future Appointment(s):* 2021  9:30 am - Marcel Barker DPM at [facility]





Functional Status





                                        Description

 

                                        No Information Available







Mental Status





                                        Description

 

                                        No Information Available







Referrals





                                        Description

 

                                        No Information Available

## 2021-10-05 ENCOUNTER — HOSPITAL ENCOUNTER (OUTPATIENT)
Dept: HOSPITAL 53 - M WHC | Age: 76
End: 2021-10-05
Attending: FAMILY MEDICINE
Payer: MEDICARE

## 2021-10-05 DIAGNOSIS — Z12.31: Primary | ICD-10-CM

## 2022-02-17 ENCOUNTER — HOSPITAL ENCOUNTER (OUTPATIENT)
Dept: HOSPITAL 53 - M LAB | Age: 77
End: 2022-02-17
Attending: FAMILY MEDICINE
Payer: MEDICARE

## 2022-02-17 DIAGNOSIS — E07.9: Primary | ICD-10-CM

## 2022-02-17 DIAGNOSIS — I10: ICD-10-CM

## 2022-02-17 LAB
ALBUMIN SERPL BCG-MCNC: 3.8 GM/DL (ref 3.2–5.2)
ALT SERPL W P-5'-P-CCNC: 19 U/L (ref 12–78)
BILIRUB SERPL-MCNC: 0.7 MG/DL (ref 0.2–1)
BUN SERPL-MCNC: 23 MG/DL (ref 7–18)
CALCIUM SERPL-MCNC: 9.2 MG/DL (ref 8.8–10.2)
CHLORIDE SERPL-SCNC: 108 MEQ/L (ref 98–107)
CHOLEST SERPL-MCNC: 167 MG/DL (ref ?–200)
CHOLEST/HDLC SERPL: 2.17 {RATIO} (ref ?–5)
CO2 SERPL-SCNC: 25 MEQ/L (ref 21–32)
CREAT SERPL-MCNC: 1.07 MG/DL (ref 0.55–1.3)
GFR SERPL CREATININE-BSD FRML MDRD: 52.8 ML/MIN/{1.73_M2} (ref 39–?)
GLUCOSE SERPL-MCNC: 105 MG/DL (ref 70–100)
HCT VFR BLD AUTO: 41.1 % (ref 36–47)
HDLC SERPL-MCNC: 77 MG/DL (ref 40–?)
HGB BLD-MCNC: 13.2 G/DL (ref 12–15.5)
LDLC SERPL CALC-MCNC: 71 MG/DL (ref ?–100)
MCH RBC QN AUTO: 31.1 PG (ref 27–33)
MCHC RBC AUTO-ENTMCNC: 32.1 G/DL (ref 32–36.5)
MCV RBC AUTO: 96.9 FL (ref 80–96)
NONHDLC SERPL-MCNC: 90 MG/DL
PLATELET # BLD AUTO: 272 10^3/UL (ref 150–450)
POTASSIUM SERPL-SCNC: 4.9 MEQ/L (ref 3.5–5.1)
PROT SERPL-MCNC: 7.1 GM/DL (ref 6.4–8.2)
RBC # BLD AUTO: 4.24 10^6/UL (ref 4–5.4)
SODIUM SERPL-SCNC: 142 MEQ/L (ref 136–145)
T3FREE SERPL-MCNC: 3 PG/ML (ref 2.2–4)
T4 FREE SERPL-MCNC: 1.1 NG/DL (ref 0.76–1.46)
TRIGL SERPL-MCNC: 93 MG/DL (ref ?–150)
TSH SERPL DL<=0.005 MIU/L-ACNC: 4.67 UIU/ML (ref 0.36–3.74)
WBC # BLD AUTO: 7.3 10^3/UL (ref 4–10)

## 2022-07-07 ENCOUNTER — HOSPITAL ENCOUNTER (EMERGENCY)
Dept: HOSPITAL 53 - M ED | Age: 77
Discharge: HOME | End: 2022-07-07
Payer: MEDICARE

## 2022-07-07 VITALS — HEIGHT: 63 IN | WEIGHT: 132.72 LBS | BODY MASS INDEX: 23.52 KG/M2

## 2022-07-07 VITALS — SYSTOLIC BLOOD PRESSURE: 160 MMHG | DIASTOLIC BLOOD PRESSURE: 80 MMHG

## 2022-07-07 DIAGNOSIS — M17.12: ICD-10-CM

## 2022-07-07 DIAGNOSIS — M79.662: ICD-10-CM

## 2022-07-07 DIAGNOSIS — I10: ICD-10-CM

## 2022-07-07 DIAGNOSIS — M70.42: Primary | ICD-10-CM

## 2022-07-07 DIAGNOSIS — Z79.899: ICD-10-CM

## 2022-07-07 DIAGNOSIS — M54.9: ICD-10-CM

## 2022-08-30 ENCOUNTER — HOSPITAL ENCOUNTER (OUTPATIENT)
Dept: HOSPITAL 53 - M LAB | Age: 77
End: 2022-08-30
Attending: PHYSICIAN ASSISTANT
Payer: MEDICARE

## 2022-08-30 DIAGNOSIS — I10: Primary | ICD-10-CM

## 2022-08-30 LAB
BUN SERPL-MCNC: 22 MG/DL (ref 7–18)
CALCIUM SERPL-MCNC: 9.3 MG/DL (ref 8.8–10.2)
CHLORIDE SERPL-SCNC: 107 MEQ/L (ref 98–107)
CO2 SERPL-SCNC: 27 MEQ/L (ref 21–32)
CREAT SERPL-MCNC: 0.92 MG/DL (ref 0.55–1.3)
GFR SERPL CREATININE-BSD FRML MDRD: > 60 ML/MIN/{1.73_M2} (ref 39–?)
GLUCOSE SERPL-MCNC: 91 MG/DL (ref 70–100)
POTASSIUM SERPL-SCNC: 4.7 MEQ/L (ref 3.5–5.1)
SODIUM SERPL-SCNC: 139 MEQ/L (ref 136–145)

## 2022-10-11 ENCOUNTER — HOSPITAL ENCOUNTER (OUTPATIENT)
Dept: HOSPITAL 53 - M WHC | Age: 77
End: 2022-10-11
Attending: FAMILY MEDICINE
Payer: MEDICARE

## 2022-10-11 DIAGNOSIS — Z12.31: Primary | ICD-10-CM

## 2023-02-22 ENCOUNTER — HOSPITAL ENCOUNTER (OUTPATIENT)
Dept: HOSPITAL 53 - M LAB | Age: 78
End: 2023-02-22
Attending: FAMILY MEDICINE
Payer: MEDICARE

## 2023-02-22 DIAGNOSIS — E07.9: Primary | ICD-10-CM

## 2023-02-22 DIAGNOSIS — I10: ICD-10-CM

## 2023-02-22 LAB
ALBUMIN SERPL BCG-MCNC: 3.7 G/DL (ref 3.2–5.2)
ALP SERPL-CCNC: 80 U/L (ref 46–116)
ALT SERPL W P-5'-P-CCNC: 15 U/L (ref 7–40)
AST SERPL-CCNC: 29 U/L (ref ?–34)
BASOPHILS # BLD AUTO: 0 10^3/UL (ref 0–0.2)
BASOPHILS NFR BLD AUTO: 0.7 % (ref 0–1)
BILIRUB SERPL-MCNC: 0.9 MG/DL (ref 0.3–1.2)
BUN SERPL-MCNC: 23 MG/DL (ref 9–23)
CALCIUM SERPL-MCNC: 9.6 MG/DL (ref 8.3–10.6)
CHLORIDE SERPL-SCNC: 108 MMOL/L (ref 98–107)
CHOLEST SERPL-MCNC: 169 MG/DL (ref ?–200)
CHOLEST/HDLC SERPL: 2.51 {RATIO} (ref ?–5)
CO2 SERPL-SCNC: 29 MMOL/L (ref 20–31)
CREAT SERPL-MCNC: 0.95 MG/DL (ref 0.55–1.3)
EOSINOPHIL # BLD AUTO: 0.2 10^3/UL (ref 0–0.5)
EOSINOPHIL NFR BLD AUTO: 4 % (ref 0–3)
GFR SERPL CREATININE-BSD FRML MDRD: > 60 ML/MIN/{1.73_M2} (ref 39–?)
GLUCOSE SERPL-MCNC: 98 MG/DL (ref 74–106)
HCT VFR BLD AUTO: 40.2 % (ref 36–47)
HDLC SERPL-MCNC: 67.1 MG/DL (ref 40–?)
HGB BLD-MCNC: 13.2 G/DL (ref 12–15.5)
LDLC SERPL CALC-MCNC: 88.3 MG/DL (ref ?–100)
LYMPHOCYTES # BLD AUTO: 1.8 10^3/UL (ref 1.5–5)
LYMPHOCYTES NFR BLD AUTO: 32 % (ref 24–44)
MCH RBC QN AUTO: 31.4 PG (ref 27–33)
MCHC RBC AUTO-ENTMCNC: 32.8 G/DL (ref 32–36.5)
MCV RBC AUTO: 95.5 FL (ref 80–96)
MONOCYTES # BLD AUTO: 0.6 10^3/UL (ref 0–0.8)
MONOCYTES NFR BLD AUTO: 10.7 % (ref 2–8)
NEUTROPHILS # BLD AUTO: 3 10^3/UL (ref 1.5–8.5)
NEUTROPHILS NFR BLD AUTO: 52.2 % (ref 36–66)
NONHDLC SERPL-MCNC: 102 MG/DL
PLATELET # BLD AUTO: 239 10^3/UL (ref 150–450)
POTASSIUM SERPL-SCNC: 4.4 MMOL/L (ref 3.5–5.1)
PROT SERPL-MCNC: 6.5 G/DL (ref 5.7–8.2)
RBC # BLD AUTO: 4.21 10^6/UL (ref 4–5.4)
SODIUM SERPL-SCNC: 142 MMOL/L (ref 136–145)
T3FREE SERPL-MCNC: 3.5 PG/ML (ref 2.3–4.2)
T4 FREE SERPL-MCNC: 1.09 NG/DL (ref 0.89–1.76)
TRIGL SERPL-MCNC: 68 MG/DL (ref ?–150)
TSH SERPL DL<=0.005 MIU/L-ACNC: 5.18 UIU/ML (ref 0.55–4.78)
WBC # BLD AUTO: 5.7 10^3/UL (ref 4–10)

## 2023-04-11 ENCOUNTER — HOSPITAL ENCOUNTER (OUTPATIENT)
Dept: HOSPITAL 53 - M LAB | Age: 78
End: 2023-04-11
Attending: PHYSICIAN ASSISTANT
Payer: MEDICARE

## 2023-04-11 DIAGNOSIS — I10: Primary | ICD-10-CM

## 2023-04-11 LAB
BUN SERPL-MCNC: 27 MG/DL (ref 9–23)
CALCIUM SERPL-MCNC: 9.1 MG/DL (ref 8.3–10.6)
CHLORIDE SERPL-SCNC: 106 MMOL/L (ref 98–107)
CO2 SERPL-SCNC: 27 MMOL/L (ref 20–31)
CREAT SERPL-MCNC: 0.96 MG/DL (ref 0.55–1.3)
GFR SERPL CREATININE-BSD FRML MDRD: 59.7 ML/MIN/{1.73_M2} (ref 39–?)
GLUCOSE SERPL-MCNC: 102 MG/DL (ref 74–106)
POTASSIUM SERPL-SCNC: 4.7 MMOL/L (ref 3.5–5.1)
SODIUM SERPL-SCNC: 141 MMOL/L (ref 136–145)

## 2023-10-09 ENCOUNTER — HOSPITAL ENCOUNTER (OUTPATIENT)
Dept: HOSPITAL 53 - M OPP | Age: 78
Discharge: HOME | End: 2023-10-09
Attending: INTERNAL MEDICINE
Payer: MEDICARE

## 2023-10-09 VITALS — BODY MASS INDEX: 22.75 KG/M2 | WEIGHT: 128.4 LBS | HEIGHT: 63 IN

## 2023-10-09 VITALS — TEMPERATURE: 96.9 F

## 2023-10-09 VITALS — OXYGEN SATURATION: 98 % | SYSTOLIC BLOOD PRESSURE: 170 MMHG | DIASTOLIC BLOOD PRESSURE: 79 MMHG

## 2023-10-09 DIAGNOSIS — Z79.82: ICD-10-CM

## 2023-10-09 DIAGNOSIS — K64.0: ICD-10-CM

## 2023-10-09 DIAGNOSIS — Z79.02: ICD-10-CM

## 2023-10-09 DIAGNOSIS — K57.30: ICD-10-CM

## 2023-10-09 DIAGNOSIS — Z12.11: Primary | ICD-10-CM

## 2023-10-09 DIAGNOSIS — Z79.899: ICD-10-CM

## 2023-10-10 ENCOUNTER — HOSPITAL ENCOUNTER (OUTPATIENT)
Dept: HOSPITAL 53 - M LAB | Age: 78
End: 2023-10-10
Attending: PHYSICIAN ASSISTANT
Payer: MEDICARE

## 2023-10-10 DIAGNOSIS — I10: Primary | ICD-10-CM

## 2023-10-10 LAB
BUN SERPL-MCNC: 25 MG/DL (ref 9–23)
CALCIUM SERPL-MCNC: 9 MG/DL (ref 8.3–10.6)
CHLORIDE SERPL-SCNC: 109 MMOL/L (ref 98–107)
CO2 SERPL-SCNC: 25 MMOL/L (ref 20–31)
CREAT SERPL-MCNC: 1.01 MG/DL (ref 0.55–1.3)
GFR SERPL CREATININE-BSD FRML MDRD: 56.1 ML/MIN/{1.73_M2} (ref 32–?)
GLUCOSE SERPL-MCNC: 115 MG/DL (ref 74–106)
POTASSIUM SERPL-SCNC: 4.1 MMOL/L (ref 3.5–5.1)
SODIUM SERPL-SCNC: 142 MMOL/L (ref 136–145)

## 2023-10-12 ENCOUNTER — HOSPITAL ENCOUNTER (OUTPATIENT)
Dept: HOSPITAL 53 - M WHC | Age: 78
End: 2023-10-12
Attending: FAMILY MEDICINE
Payer: MEDICARE

## 2023-10-12 DIAGNOSIS — Z12.31: Primary | ICD-10-CM

## 2024-02-26 ENCOUNTER — HOSPITAL ENCOUNTER (OUTPATIENT)
Dept: HOSPITAL 53 - M LAB | Age: 79
End: 2024-02-26
Attending: FAMILY MEDICINE
Payer: MEDICARE

## 2024-02-26 DIAGNOSIS — E07.9: Primary | ICD-10-CM

## 2024-02-26 DIAGNOSIS — I10: ICD-10-CM

## 2024-02-26 LAB
ALBUMIN SERPL BCG-MCNC: 3.7 G/DL (ref 3.2–5.2)
ALP SERPL-CCNC: 53 U/L (ref 46–116)
ALT SERPL W P-5'-P-CCNC: 12 U/L (ref 7–40)
AST SERPL-CCNC: 18 U/L (ref ?–34)
BASOPHILS # BLD AUTO: 0 10^3/UL (ref 0–0.2)
BASOPHILS NFR BLD AUTO: 0.3 % (ref 0–1)
BILIRUB SERPL-MCNC: 1 MG/DL (ref 0.3–1.2)
BUN SERPL-MCNC: 27 MG/DL (ref 9–23)
CALCIUM SERPL-MCNC: 9.1 MG/DL (ref 8.3–10.6)
CHLORIDE SERPL-SCNC: 107 MMOL/L (ref 98–107)
CHOLEST SERPL-MCNC: 149 MG/DL (ref ?–200)
CHOLEST/HDLC SERPL: 2.26 {RATIO} (ref ?–5)
CO2 SERPL-SCNC: 28 MMOL/L (ref 20–31)
CREAT SERPL-MCNC: 0.95 MG/DL (ref 0.55–1.3)
EOSINOPHIL # BLD AUTO: 0.2 10^3/UL (ref 0–0.5)
EOSINOPHIL NFR BLD AUTO: 3.6 % (ref 0–3)
GFR SERPL CREATININE-BSD FRML MDRD: > 60 ML/MIN/{1.73_M2} (ref 32–?)
GLUCOSE SERPL-MCNC: 105 MG/DL (ref 74–106)
HCT VFR BLD AUTO: 39.9 % (ref 36–47)
HDLC SERPL-MCNC: 65.9 MG/DL (ref 40–?)
HGB BLD-MCNC: 13 G/DL (ref 12–15.5)
LDLC SERPL CALC-MCNC: 66.9 MG/DL (ref ?–100)
LYMPHOCYTES # BLD AUTO: 1.9 10^3/UL (ref 1.5–5)
LYMPHOCYTES NFR BLD AUTO: 32.1 % (ref 24–44)
MCH RBC QN AUTO: 31.6 PG (ref 27–33)
MCHC RBC AUTO-ENTMCNC: 32.6 G/DL (ref 32–36.5)
MCV RBC AUTO: 97.1 FL (ref 80–96)
MONOCYTES # BLD AUTO: 0.5 10^3/UL (ref 0–0.8)
MONOCYTES NFR BLD AUTO: 8.7 % (ref 2–8)
NEUTROPHILS # BLD AUTO: 3.2 10^3/UL (ref 1.5–8.5)
NEUTROPHILS NFR BLD AUTO: 55.1 % (ref 36–66)
NONHDLC SERPL-MCNC: 83.1 MG/DL
PLATELET # BLD AUTO: 241 10^3/UL (ref 150–450)
POTASSIUM SERPL-SCNC: 4.5 MMOL/L (ref 3.5–5.1)
PROT SERPL-MCNC: 6.3 G/DL (ref 5.7–8.2)
RBC # BLD AUTO: 4.11 10^6/UL (ref 4–5.4)
SODIUM SERPL-SCNC: 138 MMOL/L (ref 136–145)
T3FREE SERPL-MCNC: 3.2 PG/ML (ref 2.3–4.2)
T4 FREE SERPL-MCNC: 1.02 NG/DL (ref 0.89–1.76)
TRIGL SERPL-MCNC: 81 MG/DL (ref ?–150)
TSH SERPL DL<=0.005 MIU/L-ACNC: 3.47 UIU/ML (ref 0.55–4.78)
WBC # BLD AUTO: 5.8 10^3/UL (ref 4–10)

## 2025-03-03 ENCOUNTER — HOSPITAL ENCOUNTER (OUTPATIENT)
Dept: HOSPITAL 53 - M LAB | Age: 80
End: 2025-03-03
Attending: FAMILY MEDICINE
Payer: MEDICARE

## 2025-03-03 DIAGNOSIS — I10: ICD-10-CM

## 2025-03-03 DIAGNOSIS — E07.9: Primary | ICD-10-CM

## 2025-03-03 LAB
ALBUMIN SERPL BCG-MCNC: 3.8 G/DL (ref 3.2–5.2)
ALP SERPL-CCNC: 84 U/L (ref 35–104)
ALT SERPL W P-5'-P-CCNC: 16 U/L (ref 7–40)
AST SERPL-CCNC: 21 U/L (ref ?–34)
BASOPHILS # BLD AUTO: 0 10^3/UL (ref 0–0.2)
BASOPHILS NFR BLD AUTO: 0.6 % (ref 0–1)
BILIRUB SERPL-MCNC: 0.9 MG/DL (ref 0.3–1.2)
BUN SERPL-MCNC: 22 MG/DL (ref 9–23)
CALCIUM SERPL-MCNC: 9.5 MG/DL (ref 8.3–10.6)
CHLORIDE SERPL-SCNC: 108 MMOL/L (ref 98–107)
CHOLEST SERPL-MCNC: 173 MG/DL (ref ?–200)
CHOLEST/HDLC SERPL: 2.51 {RATIO} (ref ?–5)
CO2 SERPL-SCNC: 28 MMOL/L (ref 20–31)
CREAT SERPL-MCNC: 0.94 MG/DL (ref 0.55–1.3)
EOSINOPHIL # BLD AUTO: 0.2 10^3/UL (ref 0–0.5)
EOSINOPHIL NFR BLD AUTO: 2.5 % (ref 0–3)
GFR SERPL CREATININE-BSD FRML MDRD: > 60 ML/MIN/{1.73_M2} (ref 32–?)
GLUCOSE SERPL-MCNC: 108 MG/DL (ref 74–106)
HCT VFR BLD AUTO: 42 % (ref 36–47)
HDLC SERPL-MCNC: 68.8 MG/DL (ref 40–?)
HGB BLD-MCNC: 13.5 G/DL (ref 12–15.5)
LDLC SERPL CALC-MCNC: 83.8 MG/DL (ref ?–100)
LYMPHOCYTES # BLD AUTO: 1.4 10^3/UL (ref 1.5–5)
LYMPHOCYTES NFR BLD AUTO: 22 % (ref 24–44)
MCH RBC QN AUTO: 31.1 PG (ref 27–33)
MCHC RBC AUTO-ENTMCNC: 32.1 G/DL (ref 32–36.5)
MCV RBC AUTO: 96.8 FL (ref 80–96)
MONOCYTES # BLD AUTO: 0.5 10^3/UL (ref 0–0.8)
MONOCYTES NFR BLD AUTO: 7.8 % (ref 2–8)
NEUTROPHILS # BLD AUTO: 4.2 10^3/UL (ref 1.5–8.5)
NEUTROPHILS NFR BLD AUTO: 66.8 % (ref 36–66)
NONHDLC SERPL-MCNC: 104.2 MG/DL
PLATELET # BLD AUTO: 244 10^3/UL (ref 150–450)
POTASSIUM SERPL-SCNC: 4.9 MMOL/L (ref 3.5–5.1)
PROT SERPL-MCNC: 6.9 G/DL (ref 5.7–8.2)
RBC # BLD AUTO: 4.34 10^6/UL (ref 4–5.4)
SODIUM SERPL-SCNC: 144 MMOL/L (ref 136–145)
T3FREE SERPL-MCNC: 3.6 PG/ML (ref 2.3–4.2)
T4 FREE SERPL-MCNC: 1.27 NG/DL (ref 0.89–1.76)
TRIGL SERPL-MCNC: 102 MG/DL (ref ?–150)
TSH SERPL DL<=0.005 MIU/L-ACNC: 3.69 UIU/ML (ref 0.55–4.78)
WBC # BLD AUTO: 6.3 10^3/UL (ref 4–10)